# Patient Record
Sex: FEMALE | Race: WHITE
[De-identification: names, ages, dates, MRNs, and addresses within clinical notes are randomized per-mention and may not be internally consistent; named-entity substitution may affect disease eponyms.]

---

## 2020-05-26 ENCOUNTER — HOSPITAL ENCOUNTER (INPATIENT)
Dept: HOSPITAL 61 - ER | Age: 60
LOS: 14 days | Discharge: HOME | DRG: 438 | End: 2020-06-09
Attending: INTERNAL MEDICINE | Admitting: INTERNAL MEDICINE
Payer: OTHER GOVERNMENT

## 2020-05-26 VITALS — DIASTOLIC BLOOD PRESSURE: 78 MMHG | SYSTOLIC BLOOD PRESSURE: 126 MMHG

## 2020-05-26 VITALS — HEIGHT: 66 IN | BODY MASS INDEX: 23.74 KG/M2 | WEIGHT: 147.71 LBS

## 2020-05-26 VITALS — DIASTOLIC BLOOD PRESSURE: 64 MMHG | SYSTOLIC BLOOD PRESSURE: 117 MMHG

## 2020-05-26 VITALS — DIASTOLIC BLOOD PRESSURE: 56 MMHG | SYSTOLIC BLOOD PRESSURE: 111 MMHG

## 2020-05-26 DIAGNOSIS — E86.0: ICD-10-CM

## 2020-05-26 DIAGNOSIS — D17.9: ICD-10-CM

## 2020-05-26 DIAGNOSIS — Z79.4: ICD-10-CM

## 2020-05-26 DIAGNOSIS — M08.20: ICD-10-CM

## 2020-05-26 DIAGNOSIS — Z79.52: ICD-10-CM

## 2020-05-26 DIAGNOSIS — T38.0X5A: ICD-10-CM

## 2020-05-26 DIAGNOSIS — Z20.828: ICD-10-CM

## 2020-05-26 DIAGNOSIS — E11.65: ICD-10-CM

## 2020-05-26 DIAGNOSIS — G47.33: ICD-10-CM

## 2020-05-26 DIAGNOSIS — E11.01: ICD-10-CM

## 2020-05-26 DIAGNOSIS — T45.1X5A: ICD-10-CM

## 2020-05-26 DIAGNOSIS — K90.0: ICD-10-CM

## 2020-05-26 DIAGNOSIS — Z83.3: ICD-10-CM

## 2020-05-26 DIAGNOSIS — J45.909: ICD-10-CM

## 2020-05-26 DIAGNOSIS — D70.1: ICD-10-CM

## 2020-05-26 DIAGNOSIS — D63.8: ICD-10-CM

## 2020-05-26 DIAGNOSIS — E87.6: ICD-10-CM

## 2020-05-26 DIAGNOSIS — K21.9: ICD-10-CM

## 2020-05-26 DIAGNOSIS — Z90.49: ICD-10-CM

## 2020-05-26 DIAGNOSIS — J98.11: ICD-10-CM

## 2020-05-26 DIAGNOSIS — Z90.710: ICD-10-CM

## 2020-05-26 DIAGNOSIS — E78.1: ICD-10-CM

## 2020-05-26 DIAGNOSIS — E87.1: ICD-10-CM

## 2020-05-26 DIAGNOSIS — K85.90: Primary | ICD-10-CM

## 2020-05-26 DIAGNOSIS — Z87.891: ICD-10-CM

## 2020-05-26 LAB
ALBUMIN SERPL-MCNC: 3.6 G/DL (ref 3.4–5)
ALBUMIN/GLOB SERPL: 1.1 {RATIO} (ref 1–1.7)
ALP SERPL-CCNC: 99 U/L (ref 46–116)
ALT SERPL-CCNC: (no result) U/L (ref 14–59)
ANION GAP SERPL CALC-SCNC: 21 MMOL/L (ref 6–14)
APTT PPP: YELLOW S
AST SERPL-CCNC: (no result) U/L (ref 15–37)
BACTERIA #/AREA URNS HPF: 0 /HPF
BASOPHILS # BLD AUTO: 0 X10^3/UL (ref 0–0.2)
BASOPHILS NFR BLD: 1 % (ref 0–3)
BILIRUB SERPL-MCNC: 1.1 MG/DL (ref 0.2–1)
BILIRUB UR QL STRIP: NEGATIVE
BUN SERPL-MCNC: 20 MG/DL (ref 7–20)
BUN/CREAT SERPL: 29 (ref 6–20)
CALCIUM SERPL-MCNC: 8.3 MG/DL (ref 8.5–10.1)
CHLORIDE SERPL-SCNC: 94 MMOL/L (ref 98–107)
CHOLEST SERPL-MCNC: 418 MG/DL (ref 0–200)
CHOLEST/HDLC SERPL: (no result) {RATIO}
CO2 SERPL-SCNC: 14 MMOL/L (ref 21–32)
CREAT SERPL-MCNC: 0.7 MG/DL (ref 0.6–1)
EOSINOPHIL NFR BLD: 0 X10^3/UL (ref 0–0.7)
EOSINOPHIL NFR BLD: 1 % (ref 0–3)
ERYTHROCYTE [DISTWIDTH] IN BLOOD BY AUTOMATED COUNT: 15.9 % (ref 11.5–14.5)
FIBRINOGEN PPP-MCNC: CLEAR MG/DL
GFR SERPLBLD BASED ON 1.73 SQ M-ARVRAT: 85.9 ML/MIN
GLOBULIN SER-MCNC: 3.4 G/DL (ref 2.2–3.8)
GLUCOSE SERPL-MCNC: 477 MG/DL (ref 70–99)
HCT VFR BLD CALC: 42.8 % (ref 36–47)
HDLC SERPL-MCNC: (no result) MG/DL (ref 40–60)
HGB BLD-MCNC: 15 G/DL (ref 12–15.5)
LDLC: (no result) MG/DL (ref 0–100)
LIPASE: (no result) U/L (ref 73–393)
LYMPHOCYTES # BLD: 1.7 X10^3/UL (ref 1–4.8)
LYMPHOCYTES NFR BLD AUTO: 28 % (ref 24–48)
MCH RBC QN AUTO: 29 PG (ref 25–35)
MCHC RBC AUTO-ENTMCNC: 35 G/DL (ref 31–37)
MCV RBC AUTO: 83 FL (ref 79–100)
MONO #: 0.4 X10^3/UL (ref 0–1.1)
MONOCYTES NFR BLD: 6 % (ref 0–9)
NEUT #: 4.1 X10^3/UL (ref 1.8–7.7)
NEUTROPHILS NFR BLD AUTO: 65 % (ref 31–73)
NITRITE UR QL STRIP: NEGATIVE
PH UR STRIP: 6 [PH]
PLATELET # BLD AUTO: 247 X10^3/UL (ref 140–400)
POTASSIUM SERPL-SCNC: 3.7 MMOL/L (ref 3.5–5.1)
PROT SERPL-MCNC: 7 G/DL (ref 6.4–8.2)
PROT UR STRIP-MCNC: NEGATIVE MG/DL
RBC # BLD AUTO: 5.14 X10^6/UL (ref 3.5–5.4)
RBC #/AREA URNS HPF: (no result) /HPF (ref 0–2)
SODIUM SERPL-SCNC: 129 MMOL/L (ref 136–145)
SQUAMOUS #/AREA URNS LPF: (no result) /LPF
TRIGL SERPL-MCNC: 3252 MG/DL (ref 0–150)
UROBILINOGEN UR-MCNC: 0.2 MG/DL
VLDLC: 650 MG/DL (ref 0–40)
WBC # BLD AUTO: 6.3 X10^3/UL (ref 4–11)
WBC #/AREA URNS HPF: 0 /HPF (ref 0–4)

## 2020-05-26 PROCEDURE — 94760 N-INVAS EAR/PLS OXIMETRY 1: CPT

## 2020-05-26 PROCEDURE — 86301 IMMUNOASSAY TUMOR CA 19-9: CPT

## 2020-05-26 PROCEDURE — 84100 ASSAY OF PHOSPHORUS: CPT

## 2020-05-26 PROCEDURE — 85027 COMPLETE CBC AUTOMATED: CPT

## 2020-05-26 PROCEDURE — G0378 HOSPITAL OBSERVATION PER HR: HCPCS

## 2020-05-26 PROCEDURE — C1751 CATH, INF, PER/CENT/MIDLINE: HCPCS

## 2020-05-26 PROCEDURE — 82962 GLUCOSE BLOOD TEST: CPT

## 2020-05-26 PROCEDURE — 80061 LIPID PANEL: CPT

## 2020-05-26 PROCEDURE — 85007 BL SMEAR W/DIFF WBC COUNT: CPT

## 2020-05-26 PROCEDURE — 83690 ASSAY OF LIPASE: CPT

## 2020-05-26 PROCEDURE — 82784 ASSAY IGA/IGD/IGG/IGM EACH: CPT

## 2020-05-26 PROCEDURE — 82787 IGG 1 2 3 OR 4 EACH: CPT

## 2020-05-26 PROCEDURE — 84478 ASSAY OF TRIGLYCERIDES: CPT

## 2020-05-26 PROCEDURE — 81001 URINALYSIS AUTO W/SCOPE: CPT

## 2020-05-26 PROCEDURE — 36573 INSJ PICC RS&I 5 YR+: CPT

## 2020-05-26 PROCEDURE — 96374 THER/PROPH/DIAG INJ IV PUSH: CPT

## 2020-05-26 PROCEDURE — C1892 INTRO/SHEATH,FIXED,PEEL-AWAY: HCPCS

## 2020-05-26 PROCEDURE — 85610 PROTHROMBIN TIME: CPT

## 2020-05-26 PROCEDURE — 85025 COMPLETE CBC W/AUTO DIFF WBC: CPT

## 2020-05-26 PROCEDURE — 83735 ASSAY OF MAGNESIUM: CPT

## 2020-05-26 PROCEDURE — 83036 HEMOGLOBIN GLYCOSYLATED A1C: CPT

## 2020-05-26 PROCEDURE — 86140 C-REACTIVE PROTEIN: CPT

## 2020-05-26 PROCEDURE — 84443 ASSAY THYROID STIM HORMONE: CPT

## 2020-05-26 PROCEDURE — 82728 ASSAY OF FERRITIN: CPT

## 2020-05-26 PROCEDURE — 80048 BASIC METABOLIC PNL TOTAL CA: CPT

## 2020-05-26 PROCEDURE — 36415 COLL VENOUS BLD VENIPUNCTURE: CPT

## 2020-05-26 PROCEDURE — 77001 FLUOROGUIDE FOR VEIN DEVICE: CPT

## 2020-05-26 PROCEDURE — 80053 COMPREHEN METABOLIC PANEL: CPT

## 2020-05-26 PROCEDURE — 96361 HYDRATE IV INFUSION ADD-ON: CPT

## 2020-05-26 PROCEDURE — 74177 CT ABD & PELVIS W/CONTRAST: CPT

## 2020-05-26 PROCEDURE — 82150 ASSAY OF AMYLASE: CPT

## 2020-05-26 RX ADMIN — INSULIN LISPRO SCH UNITS: 100 INJECTION, SOLUTION INTRAVENOUS; SUBCUTANEOUS at 16:52

## 2020-05-26 RX ADMIN — METHYLPREDNISOLONE SODIUM SUCCINATE SCH MG: 40 INJECTION, POWDER, FOR SOLUTION INTRAMUSCULAR; INTRAVENOUS at 15:49

## 2020-05-26 RX ADMIN — DILTIAZEM HYDROCHLORIDE PRN MLS/HR: 5 INJECTION INTRAVENOUS at 18:13

## 2020-05-26 RX ADMIN — MORPHINE SULFATE PRN MG: 4 INJECTION, SOLUTION INTRAMUSCULAR; INTRAVENOUS at 19:13

## 2020-05-26 RX ADMIN — INSULIN LISPRO SCH UNITS: 100 INJECTION, SOLUTION INTRAVENOUS; SUBCUTANEOUS at 20:00

## 2020-05-26 RX ADMIN — BACITRACIN SCH MLS/HR: 5000 INJECTION, POWDER, FOR SOLUTION INTRAMUSCULAR at 15:49

## 2020-05-26 RX ADMIN — ENOXAPARIN SODIUM SCH MG: 40 INJECTION SUBCUTANEOUS at 15:50

## 2020-05-26 RX ADMIN — MORPHINE SULFATE PRN MG: 4 INJECTION, SOLUTION INTRAMUSCULAR; INTRAVENOUS at 15:56

## 2020-05-26 RX ADMIN — MORPHINE SULFATE PRN MG: 4 INJECTION, SOLUTION INTRAMUSCULAR; INTRAVENOUS at 23:10

## 2020-05-26 NOTE — PDOC1
History and Physical


Date of Admission


Date of Admission


DATE: 5/26/20 


TIME: 13:30





Identification/Chief Complaint


Chief Complaint


Nausea, vomiting, abdominal pain





Source


Source:  Patient





History of Present Illness


History of Present Illness


Ms Almanza is a 58yo F [employer]  w/ PMHx celiac disease, adult onset stills 

disease, Anemia, Asthma, [place] on CPAP, GERD who presents with severe upper 

abdominal pain, nausea, vomiting, fatigue. She states this started suddenly this

morning while she was working from home on teleconference (works as a 

on the  base). She only had oatmeal for breakfast.


She has never had anything similar to this in the past.  She generally feels 

unwell.  She is not had any fever, diarrhea, chest pain, shortness of breath, 

dysuria, hematuria, blood in her stools.  She denies alcohol abuse.  Pain does 

not move anywhere.  Nothing seems to make it better or worse.  She has not tried

anything at home.  He does not drink alcohol, and is status post cholecystect

jamil.  No calcium disorders.


She takes 20mg methotrexate weekly on Fridays and is on 3 mg of chronic 

prednisone for her stills disease.  She was diagnosed with celiac disease 

January 2020 and has just recently started on a gluten-free diet.  No recent 

sick contacts that she can recall.


Labs significant for lipase 76895, glucose 477, , bilirubin 1.1, Calcium 

8.3, K 3.7, WBC 6.3, Hb 15, Platelets 247


CT abdomen pelvis shows surgically absent gallbladder and surgically absent 

uterus and describes duodenal and jejunal intraluminal lipomas. Findings of 

acute pancreatitis. No loculated collections. No biliary dilatation.


Admitted for further treatment.





Past Medical History


Pulmonary:  Asthma, Other (NATO on CPAP)


GI:  GERD, Other (Celiac disease)


Heme/Onc:  Anemia NOS


Rheumatologic:  Other (Stills disease)


Dermatology:  No pertinent hx





Past Surgical History


Past Surgical History:  Cholecystectomy, Hysterectomy, Other (Bilateral 

bunionectomy, right shoulder arthroscopy)





Family History


Family History:  Diabetes





Social History


Smoke:  Quit


ALCOHOL:  none


Drugs:  None





Current Medications


Current Medications





Current Medications


Iohexol (Omnipaque 300 Mg/ml) 75 ml 1X  ONCE IV  Last administered on 5/26/20at 

11:30;  Start 5/26/20 at 11:30;  Stop 5/26/20 at 11:31;  Status DC


Iohexol (Omnipaque 240 Mg/ml) 50 ml 1X  ONCE PO  Last administered on 5/26/20at 

11:30;  Start 5/26/20 at 11:30;  Stop 5/26/20 at 11:31;  Status DC


Info (CONTRAST GIVEN -- Rx MONITORING) 1 each PRN DAILY  PRN MC SEE COMMENTS;  

Start 5/26/20 at 11:30;  Stop 5/28/20 at 11:29


Sodium Chloride 1,000 ml @  0 mls/hr 1X  ONCE IV  Last administered on 5/26/20at

12:27;  Start 5/26/20 at 12:15;  Stop 5/26/20 at 12:16;  Status DC


Morphine Sulfate (Morphine Sulfate) 5 mg 1X  ONCE IV  Last administered on 

5/26/20at 12:45;  Start 5/26/20 at 12:30;  Stop 5/26/20 at 12:33;  Status DC





Allergies


Allergies:  


Coded Allergies:  


     coconut (Verified  Allergy, Intermediate, HIVES, 5/26/20)





ROS


General:  YES: Fatigue, Malaise; 


   No: Chills, Night Sweats, Appetite, Other


PSYCHOLOGICAL ROS:  No: Anxiety, Behavioral Disorder, Concentration difficultie,

Decreased libido, Depression, Disorientation, Hallucinations, Hostility, 

Irritablity, Memory difficulties, Mood Swings, Obsessive thoughts, Physical 

abuse, Sexual abuse, Sleep disturbances, Suicidal ideation, Other


Eyes:  No Blurry vision, No Decreased vision, No Double vision, No Dry eyes, No 

Excessive tearing, No Eye Pain, No Itchy Eyes, No Loss of vision, No 

Photophobia, No Scotomata, No Uses contacts, No Uses glasses, No Other


HEENT:  No: Heacaches, Visual Changes, Hearing change, Nasal congestion, Nasal 

discharge, Oral lesions, Sinus pain, Sore Throat, Epistaxis, Sneezing, Snoring, 

Tinnitus, Vertigo, Vocal changes, Other


ALLERGY AND IMMUNOLOGY:  No: Hives, Insect Bite Sensitivity, Itchy/Watery Eyes, 

Nasal Congestion, Post Nasal Drip, Seasonal Allergies, Other


Hematological and Lymphatic:  No: Bleeding Problems, Blood Clots, Blood 

Transfusions, Brusing, Night Sweats, Pallor, Swollen Lymph Nodes, Other


ENDOCRINE:  No: Breast Changes, Galactorrhea, Hair Pattern Changes, Hot Flashes,

Malaise/lethargy, Mood Swings, Palpitations, Polydipsia/polyuria, Skin Changes, 

Temperature Intolerance, Unexpected Weight Changes, Other


Breast:  No New/Changing Breast Lumps, No Nipple changes, No Nipple discharge, 

No Other


Respiratory:  No: Cough, Hemoptysis, Orthopnea, Pleuritic Pain, Shortness of 

breath, SOB with excertion, Sputum Changes, Stridor, Tachypnea, Wheezing, Other


Cardiovascular:  No Chest Pain, No Palpitations, No Orthopnea, No Paroxysmal 

Noc. Dyspnea, No Edema, No Lt Headedness, No Other


Gastrointestinal:  Yes Nausea, Yes Vomiting, Yes Abdominal Pain; 


   No Diarrhea, No Constipation, No Melena, No Hematochezia, No Other


Genitourinary:  No Dysuria, No Frequency, No Incontinence, No Hematuria, No 

Retention, No Discharge, No Urgency, No Pain, No Flank Pain, No Other, No , No ,

No , No , No , No , No 


Musculoskeletal:  No Gait Disturbance, No Joint Pain, No Joint Stiffness, No 

Joint Swelling, No Muscle Pain, No Muscular Weakness, No Pain In:, No Swelling 

In:, No Other


Neurological:  No Behavorial Changes, No Bowel/Bladder ControlChng, No 

Confusion, No Dizziness, No Gait Disturbance, No Headaches, No Impaired 

Coord/balance, No Memory Loss, No Numbness/Tingling, No Seizures, No Speech 

Problems, No Tremors, No Visual Changes, No Weakness, No Other


Skin:  No Dry Skin, No Eczema, No Hair Changes, No Lumps, No Mole Changes, No 

Mottling, No Nail Changes, No Pruritus, No Rash, No Skin Lesion Changes, No 

Other, No Acne





Physical Exam


General:  Alert, Oriented X3, Cooperative, moderate distress


HEENT:  Atraumatic, PERRLA, EOMI, Mucous membr. moist/pink


Lungs:  Clear to auscultation, Normal air movement


Heart:  S1S2, RRR, no thrills, no rubs, no gallops, no murmurs


Abdomen:  Normal bowel sounds, Soft, No hepatosplenomegaly, No masses, Other 

(Diffuse tenderness)


Rectal Exam:  not examined


Extremities:  No clubbing, No cyanosis, No edema, Normal pulses, No 

tenderness/swelling


Skin:  No rashes, No breakdown, No significant lesion


Neuro:  Normal gait, Normal speech, Strength at 5/5 X4 ext, Normal tone, Sens

ation intact, Cranial nerves 3-12 NL, Reflexes 2+


Psych/Mental Status:  Mental status NL, Mood NL





Vitals


Vitals





Vital Signs








  Date Time  Temp Pulse Resp B/P (MAP) Pulse Ox O2 Delivery O2 Flow Rate FiO2


 


5/26/20 12:45   19  94 Room Air  


 


5/26/20 11:52  78  138/79 (98)    


 


5/26/20 09:48 97.4       





 97.4       











Labs


Labs





Laboratory Tests








Test


 5/26/20


10:00 5/26/20


11:17


 


White Blood Count


 6.3 x10^3/uL


(4.0-11.0) 





 


Red Blood Count


 5.14 x10^6/uL


(3.50-5.40) 





 


Hemoglobin


 15.0 g/dL


(12.0-15.5) 





 


Hematocrit


 42.8 %


(36.0-47.0) 





 


Mean Corpuscular Volume 83 fL ()  


 


Mean Corpuscular Hemoglobin 29 pg (25-35)  


 


Mean Corpuscular Hemoglobin


Concent 35 g/dL


(31-37) 





 


Red Cell Distribution Width


 15.9 %


(11.5-14.5) 





 


Platelet Count


 247 x10^3/uL


(140-400) 





 


Neutrophils (%) (Auto) 65 % (31-73)  


 


Lymphocytes (%) (Auto) 28 % (24-48)  


 


Monocytes (%) (Auto) 6 % (0-9)  


 


Eosinophils (%) (Auto) 1 % (0-3)  


 


Basophils (%) (Auto) 1 % (0-3)  


 


Neutrophils # (Auto)


 4.1 x10^3/uL


(1.8-7.7) 





 


Lymphocytes # (Auto)


 1.7 x10^3/uL


(1.0-4.8) 





 


Monocytes # (Auto)


 0.4 x10^3/uL


(0.0-1.1) 





 


Eosinophils # (Auto)


 0.0 x10^3/uL


(0.0-0.7) 





 


Basophils # (Auto)


 0.0 x10^3/uL


(0.0-0.2) 





 


Sodium Level


 129 mmol/L


(136-145) 





 


Potassium Level


 3.7 mmol/L


(3.5-5.1) 





 


Chloride Level


 94 mmol/L


() 





 


Carbon Dioxide Level


 14 mmol/L


(21-32) 





 


Anion Gap 21 (6-14)  


 


Blood Urea Nitrogen


 20 mg/dL


(7-20) 





 


Creatinine


 0.7 mg/dL


(0.6-1.0) 





 


Estimated GFR


(Cockcroft-Gault) 85.9 


 





 


BUN/Creatinine Ratio 29 (6-20)  


 


Glucose Level


 477 mg/dL


(70-99) 





 


Calcium Level


 8.3 mg/dL


(8.5-10.1) 





 


Total Bilirubin


 1.1 mg/dL


(0.2-1.0) 





 


Aspartate Amino Transf


(AST/SGOT)  U/L (15-37) 


 





 


Alanine Aminotransferase


(ALT/SGPT)  U/L (14-59) 


 





 


Alkaline Phosphatase


 99 U/L


() 





 


Total Protein


 7.0 g/dL


(6.4-8.2) 





 


Albumin


 3.6 g/dL


(3.4-5.0) 





 


Albumin/Globulin Ratio 1.1 (1.0-1.7)  


 


Lipase


 14100 U/L


() 





 


Urine Collection Type  Unknown 


 


Urine Color  Yellow 


 


Urine Clarity  Clear 


 


Urine pH  6.0 (<5.0-8.0) 


 


Urine Specific Gravity


 


 >=1.030


(1.000-1.030)


 


Urine Protein


 


 Negative mg/dL


(NEG-TRACE)


 


Urine Glucose (UA)


 


 >=1000 mg/dL


(NEG)


 


Urine Ketones (Stick)  40 mg/dL (NEG) 


 


Urine Blood  Negative (NEG) 


 


Urine Nitrite  Negative (NEG) 


 


Urine Bilirubin  Negative (NEG) 


 


Urine Urobilinogen Dipstick


 


 0.2 mg/dL (0.2


mg/dL)


 


Urine Leukocyte Esterase  Negative (NEG) 


 


Urine RBC  Occ /HPF (0-2) 


 


Urine WBC  0 /HPF (0-4) 


 


Urine Squamous Epithelial


Cells 


 Occ /LPF 





 


Urine Bacteria  0 /HPF (0-FEW) 








Laboratory Tests








Test


 5/26/20


10:00 5/26/20


11:17


 


White Blood Count


 6.3 x10^3/uL


(4.0-11.0) 





 


Red Blood Count


 5.14 x10^6/uL


(3.50-5.40) 





 


Hemoglobin


 15.0 g/dL


(12.0-15.5) 





 


Hematocrit


 42.8 %


(36.0-47.0) 





 


Mean Corpuscular Volume 83 fL ()  


 


Mean Corpuscular Hemoglobin 29 pg (25-35)  


 


Mean Corpuscular Hemoglobin


Concent 35 g/dL


(31-37) 





 


Red Cell Distribution Width


 15.9 %


(11.5-14.5) 





 


Platelet Count


 247 x10^3/uL


(140-400) 





 


Neutrophils (%) (Auto) 65 % (31-73)  


 


Lymphocytes (%) (Auto) 28 % (24-48)  


 


Monocytes (%) (Auto) 6 % (0-9)  


 


Eosinophils (%) (Auto) 1 % (0-3)  


 


Basophils (%) (Auto) 1 % (0-3)  


 


Neutrophils # (Auto)


 4.1 x10^3/uL


(1.8-7.7) 





 


Lymphocytes # (Auto)


 1.7 x10^3/uL


(1.0-4.8) 





 


Monocytes # (Auto)


 0.4 x10^3/uL


(0.0-1.1) 





 


Eosinophils # (Auto)


 0.0 x10^3/uL


(0.0-0.7) 





 


Basophils # (Auto)


 0.0 x10^3/uL


(0.0-0.2) 





 


Sodium Level


 129 mmol/L


(136-145) 





 


Potassium Level


 3.7 mmol/L


(3.5-5.1) 





 


Chloride Level


 94 mmol/L


() 





 


Carbon Dioxide Level


 14 mmol/L


(21-32) 





 


Anion Gap 21 (6-14)  


 


Blood Urea Nitrogen


 20 mg/dL


(7-20) 





 


Creatinine


 0.7 mg/dL


(0.6-1.0) 





 


Estimated GFR


(Cockcroft-Gault) 85.9 


 





 


BUN/Creatinine Ratio 29 (6-20)  


 


Glucose Level


 477 mg/dL


(70-99) 





 


Calcium Level


 8.3 mg/dL


(8.5-10.1) 





 


Total Bilirubin


 1.1 mg/dL


(0.2-1.0) 





 


Aspartate Amino Transf


(AST/SGOT)  U/L (15-37) 


 





 


Alanine Aminotransferase


(ALT/SGPT)  U/L (14-59) 


 





 


Alkaline Phosphatase


 99 U/L


() 





 


Total Protein


 7.0 g/dL


(6.4-8.2) 





 


Albumin


 3.6 g/dL


(3.4-5.0) 





 


Albumin/Globulin Ratio 1.1 (1.0-1.7)  


 


Lipase


 53999 U/L


() 





 


Urine Collection Type  Unknown 


 


Urine Color  Yellow 


 


Urine Clarity  Clear 


 


Urine pH  6.0 (<5.0-8.0) 


 


Urine Specific Gravity


 


 >=1.030


(1.000-1.030)


 


Urine Protein


 


 Negative mg/dL


(NEG-TRACE)


 


Urine Glucose (UA)


 


 >=1000 mg/dL


(NEG)


 


Urine Ketones (Stick)  40 mg/dL (NEG) 


 


Urine Blood  Negative (NEG) 


 


Urine Nitrite  Negative (NEG) 


 


Urine Bilirubin  Negative (NEG) 


 


Urine Urobilinogen Dipstick


 


 0.2 mg/dL (0.2


mg/dL)


 


Urine Leukocyte Esterase  Negative (NEG) 


 


Urine RBC  Occ /HPF (0-2) 


 


Urine WBC  0 /HPF (0-4) 


 


Urine Squamous Epithelial


Cells 


 Occ /LPF 





 


Urine Bacteria  0 /HPF (0-FEW) 











Images


Images


CT abdomen -


Mild bibasilar costophrenic sulcus atelectasis is present. Bilateral carotid 

cholecystectomy noted. Liver and spleen are unremarkable. At the inferior tip of

the right hepatic lobe, there is a very small to characterize low-attenuation 

lesion which is present on axial image 49. 


This may represent a cyst or other benign process.


Stranding about the pancreas with surrounding edema is noted. Edema extends 

along the left lateral conal fascia.


Low-attenuation involving the distal duodenum on axial image 49 is present 

measuring 0.6 cm diameter. Within the right proximal jejunum on coronal image 

23, there is a linear fat attenuation structure measuring 2.1 cm x 0.4 cm. These

have the appearance of benign lipomas. There is no bowel obstruction. No 

extraluminal gas. Moderate quantity of stool in the colon is present. Appendix 

is normal. No inflammatory change about the cecum. 


Urinary bladder is unremarkable.


 


Lower lumbar spine facet joint degenerative remodeling is present. 


Transitional L5 vertebra is present.


Hysterectomy noted.


 


Impression:


Findings of acute pancreatitis. No loculated collections. No biliary dilatation.




Distal duodenal and proximal jejunal intraluminal lipomas.





VTE Prophylaxis Ordered


VTE Prophylaxis Devices:  No


VTE Pharmacological Prophylaxi:  Yes





Assessment/Plan


Assessment/Plan


A/P:


Acute pancreatitis - with N/V, abdominal pain and elevated lipase, CT confirmed.

Will keep NPO, pain control. IV fluids. Consult GI. Hold methotrexate. Check 

triglycerides


Duodenal and jejunal intraluminal lipomas - GI consulted, uncertain clinical 

signficance


Hyponatremia - likely from hyperglycemia


Hyperglycemia - given her steroids and methotrexate therapy is at risk for 

developing diabetes, will check A1c, sliding scale q4hrs


Celiac disease - on diet outpatient, NPO for now


Adult onset stills disease - on MTX 20mg weekly on Fridays, Prednisone 3mg 

daily.


Anemia - likely related to chronic disease.


Asthma - prn nebs


NATO on CPAP - can bring her home CPAP vs O2 nocturnal


GERD - PPI IV





FEN - NPO


PPX - lovenox


FULL CODE


Dispo - inpatient for above











TITO ASHBY MD        May 26, 2020 13:37

## 2020-05-26 NOTE — RAD
Examination: CT ABD PELV W/ORAL IV CONTRAST

 

History: Mid abdominal pain

 

Comparison/Correlation: 7/22/2009 CT abdomen and pelvis without contrast

 

Findings: Axial images of the abdomen and pelvis were obtained following 

IV and oral contrast. Sagittal and coronal reformatted images were 

provided.

 

Mild bibasilar costophrenic sulcus atelectasis is present. Bilateral 

carotid cholecystectomy noted. Liver and spleen are unremarkable. At the 

inferior tip of the right hepatic lobe, there is a very small to 

characterize low-attenuation lesion which is present on axial image 49. 

This may represent a cyst or other benign process.

 

Stranding about the pancreas with surrounding edema is noted. Edema 

extends along the left lateral conal fascia.

 

Low-attenuation involving the distal duodenum on axial image 49 is present

measuring 0.6 cm diameter. Within the right proximal jejunum on coronal 

image 23, there is a linear fat attenuation structure measuring 2.1 cm x 

0.4 cm. These have the appearance of benign lipomas. There is no bowel 

obstruction. No extraluminal gas. Moderate quantity of stool in the colon 

is present. Appendix is normal. No inflammatory change about the cecum. 

Urinary bladder is unremarkable.

 

Lower lumbar spine facet joint degenerative remodeling is present. 

Transitional L5 vertebra is present.

Hysterectomy noted.

 

Impression:

Findings of acute pancreatitis. No loculated collections. No biliary 

dilatation.

 

Distal duodenal and proximal jejunal intraluminal lipomas.

 

 

 

PQRS Compliance Statement:

 

One or more of the following individualized dose reduction techniques were

utilized for this examination:  

1. Automated exposure control  

2. Adjustment of the mA and/or kV according to patient size  

3. Use of iterative reconstruction technique

 

Electronically signed by: Ozzy Armenta MD (5/26/2020 12:47 PM) [id number]

## 2020-05-26 NOTE — PHYS DOC
Past Medical History


Past Medical History:  Anemia, Asthma, GERD, Other


Additional Past Medical Histor:  CELIAC,ADULT ONSET STILL'S DISEASE


Past Surgical History:  Cholecystectomy, Hysterectomy, Other


Additional Past Surgical Histo:  R SHOULDER,BILAT BUNIONECTOMY


Smoking Status:  Former Smoker


Alcohol Use:  None





General Adult


EDM:


Chief Complaint:  ABDOMINAL PAIN





HPI:


HPI:





Patient is a 59  year old female who presents with severe upper abdominal pain, 

nausea, vomiting, fatigue.  She states this started suddenly this morning while 

she was working from home on [employer].  She has never had anything similar

to this in the past.  She generally feels unwell.  She is not had any fever, 

diarrhea, chest pain, shortness of breath, dysuria, hematuria, blood in her 

stools.  She denies alcohol abuse.  Pain does not move anywhere.  Nothing seems 

to make it better or worse.  She has not tried anything at home.





Review of Systems:


Review of Systems:


General: Denies fever, chills, sweats, fatigue


Eyes: Denies drainage, blurred vision


HENT: Denies rhinorrhea, sore throat


Respiratory: Denies cough, shortness of breath, wheezing


Cardiac: Denies edema, palpitations, chest pain


GI: Reports abdominal pain, N/V


MSK: Denies back pain, neck pain


Skin: Denies rash, jaundice


Neuro: Denies headache, dizziness


Psychiatric: Denies SI/HI





Heart Score:


Risk Factors:


Risk Factors:  DM, Current or recent (<one month) smoker, HTN, HLP, family 

history of CAD, obesity.


Risk Scores:


Score 0 - 3:  2.5% MACE over next 6 weeks - Discharge Home


Score 4 - 6:  20.3% MACE over next 6 weeks - Admit for Clinical Observation


Score 7 - 10:  72.7% MACE over next 6 weeks - Early Invasive Strategies





Current Medications:





Current Medications








 Medications


  (Trade)  Dose


 Ordered  Sig/Jean  Start Time


 Stop Time Status Last Admin


Dose Admin


 


 Info


  (CONTRAST GIVEN


 -- Rx MONITORING)  1 each  PRN DAILY  PRN  5/26/20 11:30


 5/28/20 11:29   





 


 Iohexol


  (Omnipaque 240


 Mg/ml)  50 ml  1X  ONCE  5/26/20 11:30


 5/26/20 11:31 DC 5/26/20 11:30


50 ML


 


 Iohexol


  (Omnipaque 300


 Mg/ml)  75 ml  1X  ONCE  5/26/20 11:30


 5/26/20 11:31 DC 5/26/20 11:30


75 ML


 


 Morphine Sulfate


  (Morphine


 Sulfate)  5 mg  1X  ONCE  5/26/20 12:30


 5/26/20 12:33 DC 5/26/20 12:45


5 MG


 


 Sodium Chloride  1,000 ml @ 


 0 mls/hr  1X  ONCE  5/26/20 12:15


 5/26/20 12:16 DC 5/26/20 12:27


1,000 MLS/HR











Allergies:


Allergies:





Allergies








Coded Allergies Type Severity Reaction Last Updated Verified


 


  coconut Allergy Intermediate HIVES 5/26/20 Yes











Physical Exam:


PE:


Constitutional: Well developed, well nourished, Cooperative, NAD, ill appearing


HEENT: Normocephalic, atraumatic, oropharynx dry, EOMI, PERRL, no drainage from 

eyes, normal conjunctiva


Neck: Supple, normal range of motion, no stridor


Cardiovascular: Tachycardic, 2+ radial pulses bilaterally, no edema


Respiratory: CTA bilaterally, no respiratory distress, no wheezing/crackles


Abdomen: Soft, epigastric and periumbilical tenderness, nondistended, no masses


Skin: Warm, dry, intact


Extremities: No obvious deformities


Neurologic: Alert and Oriented x3, motor and sensory function grossly normal, no

focal deficits


Psychologic: Normal affect, normal judgment, normal mood. No SI/HI





Current Patient Data:


Labs:





                                Laboratory Tests








Test


 5/26/20


10:00 5/26/20


11:17


 


White Blood Count


 6.3 x10^3/uL


(4.0-11.0) 





 


Red Blood Count


 5.14 x10^6/uL


(3.50-5.40) 





 


Hemoglobin


 15.0 g/dL


(12.0-15.5) 





 


Hematocrit


 42.8 %


(36.0-47.0) 





 


Mean Corpuscular Volume


 83 fL ()


 





 


Mean Corpuscular Hemoglobin 29 pg (25-35)   


 


Mean Corpuscular Hemoglobin


Concent 35 g/dL


(31-37) 





 


Red Cell Distribution Width


 15.9 %


(11.5-14.5)  H 





 


Platelet Count


 247 x10^3/uL


(140-400) 





 


Neutrophils (%) (Auto) 65 % (31-73)   


 


Lymphocytes (%) (Auto) 28 % (24-48)   


 


Monocytes (%) (Auto) 6 % (0-9)   


 


Eosinophils (%) (Auto) 1 % (0-3)   


 


Basophils (%) (Auto) 1 % (0-3)   


 


Neutrophils # (Auto)


 4.1 x10^3/uL


(1.8-7.7) 





 


Lymphocytes # (Auto)


 1.7 x10^3/uL


(1.0-4.8) 





 


Monocytes # (Auto)


 0.4 x10^3/uL


(0.0-1.1) 





 


Eosinophils # (Auto)


 0.0 x10^3/uL


(0.0-0.7) 





 


Basophils # (Auto)


 0.0 x10^3/uL


(0.0-0.2) 





 


Sodium Level


 129 mmol/L


(136-145)  L 





 


Potassium Level


 3.7 mmol/L


(3.5-5.1) 





 


Chloride Level


 94 mmol/L


()  L 





 


Carbon Dioxide Level


 14 mmol/L


(21-32)  L 





 


Anion Gap 21 (6-14)  H 


 


Blood Urea Nitrogen


 20 mg/dL


(7-20) 





 


Creatinine


 0.7 mg/dL


(0.6-1.0) 





 


Estimated GFR


(Cockcroft-Gault) 85.9  


 





 


BUN/Creatinine Ratio 29 (6-20)  H 


 


Glucose Level


 477 mg/dL


(70-99)  H 





 


Calcium Level


 8.3 mg/dL


(8.5-10.1)  L 





 


Total Bilirubin


 1.1 mg/dL


(0.2-1.0)  H 





 


Aspartate Amino Transferase


(AST)  U/L (15-37)  


 





 


Alanine Aminotransferase (ALT)  U/L (14-59)   


 


Alkaline Phosphatase


 99 U/L


() 





 


Total Protein


 7.0 g/dL


(6.4-8.2) 





 


Albumin


 3.6 g/dL


(3.4-5.0) 





 


Albumin/Globulin Ratio 1.1 (1.0-1.7)   


 


Lipase


 11539 U/L


()  H 





 


Urine Collection Type  Unknown  


 


Urine Color  Yellow  


 


Urine Clarity  Clear  


 


Urine pH


 


 6.0 (<5.0-8.0)





 


Urine Specific Gravity


 


 >=1.030


(1.000-1.030)


 


Urine Protein


 


 Negative mg/dL


(NEG-TRACE)


 


Urine Glucose (UA)


 


 >=1000 mg/dL


(NEG)


 


Urine Ketones (Stick)


 


 40 mg/dL (NEG)





 


Urine Blood


 


 Negative (NEG)





 


Urine Nitrite


 


 Negative (NEG)





 


Urine Bilirubin


 


 Negative (NEG)





 


Urine Urobilinogen Dipstick


 


 0.2 mg/dL (0.2


mg/dL)


 


Urine Leukocyte Esterase


 


 Negative (NEG)





 


Urine RBC


 


 Occ /HPF (0-2)





 


Urine WBC  0 /HPF (0-4)  


 


Urine Squamous Epithelial


Cells 


 Occ /LPF  





 


Urine Bacteria


 


 0 /HPF (0-FEW)








                                Laboratory Tests


5/26/20 10:00








                                Laboratory Tests


5/26/20 10:00








Vital Signs:





                                   Vital Signs








  Date Time  Temp Pulse Resp B/P (MAP) Pulse Ox O2 Delivery O2 Flow Rate FiO2


 


5/26/20 14:16 97.9 92 20 126/78 (94) 95 Room Air  





 97.9       











EKG:


EKG:


[]





Radiology/Procedures:


Radiology/Procedures:


[]





Course & Med Decision Making:


Course & Med Decision Making


Pertinent Labs and Imaging studies reviewed. (See chart for details)





Patient is a 59-year-old female presents to the emergency room complaining of 

severe abdominal pain, nausea, vomiting.  Patient has significant tenderness.  

She is ill-appearing on exam.  Vitals are otherwise stable.  Abdominal labs 

including CBC, LFTs, BMP, lipase, UA were ordered.  CT abdomen pelvis was also 

ordered due to significant tenderness and patient's age.  Labs are suggestive of

 pancreatitis.  Patient does have some electrolyte abnormalities and will be 

given fluids and pain medicine down here in the emergency room.  I have 

discussed the results with her and have recommended admission.  Patient is in 

agreement with plan.  She will be admitted for further care.





Dragon Disclaimer:


Dragon Disclaimer:


This electronic medical record was generated, in whole or in part, using a voice

 recognition dictation system.





Departure


Departure


Impression:  


   Primary Impression:  


   Pancreatitis, acute


   Additional Impressions:  


   Dehydration


   Hyperglycemia


Disposition:  09 ADMITTED AS INPATIENT


Condition:  STABLE


Referrals:  


BAILEE WHITLOCK MD (PCP)











ISRAEL PAUL MD              May 26, 2020 14:40

## 2020-05-27 VITALS — DIASTOLIC BLOOD PRESSURE: 74 MMHG | SYSTOLIC BLOOD PRESSURE: 124 MMHG

## 2020-05-27 VITALS — DIASTOLIC BLOOD PRESSURE: 66 MMHG | SYSTOLIC BLOOD PRESSURE: 116 MMHG

## 2020-05-27 VITALS — SYSTOLIC BLOOD PRESSURE: 130 MMHG | DIASTOLIC BLOOD PRESSURE: 77 MMHG

## 2020-05-27 VITALS — DIASTOLIC BLOOD PRESSURE: 72 MMHG | SYSTOLIC BLOOD PRESSURE: 131 MMHG

## 2020-05-27 VITALS — DIASTOLIC BLOOD PRESSURE: 59 MMHG | SYSTOLIC BLOOD PRESSURE: 106 MMHG

## 2020-05-27 VITALS — DIASTOLIC BLOOD PRESSURE: 72 MMHG | SYSTOLIC BLOOD PRESSURE: 135 MMHG

## 2020-05-27 VITALS — DIASTOLIC BLOOD PRESSURE: 58 MMHG | SYSTOLIC BLOOD PRESSURE: 115 MMHG

## 2020-05-27 LAB
ALBUMIN SERPL-MCNC: 2.9 G/DL (ref 3.4–5)
ALBUMIN/GLOB SERPL: 0.8 {RATIO} (ref 1–1.7)
ALP SERPL-CCNC: 78 U/L (ref 46–116)
ALT SERPL-CCNC: 30 U/L (ref 14–59)
ANION GAP SERPL CALC-SCNC: 8 MMOL/L (ref 6–14)
AST SERPL-CCNC: 22 U/L (ref 15–37)
BASOPHILS # BLD AUTO: 0 X10^3/UL (ref 0–0.2)
BASOPHILS NFR BLD: 1 % (ref 0–3)
BILIRUB SERPL-MCNC: 0.8 MG/DL (ref 0.2–1)
BUN SERPL-MCNC: 16 MG/DL (ref 7–20)
BUN/CREAT SERPL: 20 (ref 6–20)
CALCIUM SERPL-MCNC: 7.8 MG/DL (ref 8.5–10.1)
CHLORIDE SERPL-SCNC: 106 MMOL/L (ref 98–107)
CO2 SERPL-SCNC: 25 MMOL/L (ref 21–32)
CREAT SERPL-MCNC: 0.8 MG/DL (ref 0.6–1)
EOSINOPHIL NFR BLD: 0 % (ref 0–3)
EOSINOPHIL NFR BLD: 0 X10^3/UL (ref 0–0.7)
ERYTHROCYTE [DISTWIDTH] IN BLOOD BY AUTOMATED COUNT: 16.2 % (ref 11.5–14.5)
GFR SERPLBLD BASED ON 1.73 SQ M-ARVRAT: 73.7 ML/MIN
GLOBULIN SER-MCNC: 3.5 G/DL (ref 2.2–3.8)
GLUCOSE SERPL-MCNC: 215 MG/DL (ref 70–99)
HBA1C MFR BLD: 13.6 % (ref 4.8–5.6)
HCT VFR BLD CALC: 40.4 % (ref 36–47)
HGB BLD-MCNC: 13.8 G/DL (ref 12–15.5)
LYMPHOCYTES # BLD: 0.8 X10^3/UL (ref 1–4.8)
LYMPHOCYTES NFR BLD AUTO: 17 % (ref 24–48)
MCH RBC QN AUTO: 28 PG (ref 25–35)
MCHC RBC AUTO-ENTMCNC: 34 G/DL (ref 31–37)
MCV RBC AUTO: 83 FL (ref 79–100)
MONO #: 0.7 X10^3/UL (ref 0–1.1)
MONOCYTES NFR BLD: 16 % (ref 0–9)
NEUT #: 2.9 X10^3/UL (ref 1.8–7.7)
NEUTROPHILS NFR BLD AUTO: 66 % (ref 31–73)
PLATELET # BLD AUTO: 217 X10^3/UL (ref 140–400)
POTASSIUM SERPL-SCNC: 4.2 MMOL/L (ref 3.5–5.1)
PROT SERPL-MCNC: 6.4 G/DL (ref 6.4–8.2)
RBC # BLD AUTO: 4.87 X10^6/UL (ref 3.5–5.4)
SODIUM SERPL-SCNC: 139 MMOL/L (ref 136–145)
WBC # BLD AUTO: 4.5 X10^3/UL (ref 4–11)

## 2020-05-27 RX ADMIN — FENTANYL CITRATE PRN MCG: 50 INJECTION INTRAMUSCULAR; INTRAVENOUS at 14:29

## 2020-05-27 RX ADMIN — INSULIN LISPRO SCH UNITS: 100 INJECTION, SOLUTION INTRAVENOUS; SUBCUTANEOUS at 17:17

## 2020-05-27 RX ADMIN — INSULIN LISPRO SCH UNITS: 100 INJECTION, SOLUTION INTRAVENOUS; SUBCUTANEOUS at 09:16

## 2020-05-27 RX ADMIN — INSULIN LISPRO SCH UNITS: 100 INJECTION, SOLUTION INTRAVENOUS; SUBCUTANEOUS at 00:00

## 2020-05-27 RX ADMIN — INSULIN LISPRO SCH UNITS: 100 INJECTION, SOLUTION INTRAVENOUS; SUBCUTANEOUS at 12:21

## 2020-05-27 RX ADMIN — FENTANYL CITRATE PRN MCG: 50 INJECTION INTRAMUSCULAR; INTRAVENOUS at 18:12

## 2020-05-27 RX ADMIN — BACITRACIN SCH MLS/HR: 5000 INJECTION, POWDER, FOR SOLUTION INTRAMUSCULAR at 02:02

## 2020-05-27 RX ADMIN — MORPHINE SULFATE PRN MG: 4 INJECTION, SOLUTION INTRAMUSCULAR; INTRAVENOUS at 02:10

## 2020-05-27 RX ADMIN — ENOXAPARIN SODIUM SCH MG: 40 INJECTION SUBCUTANEOUS at 17:11

## 2020-05-27 RX ADMIN — INSULIN LISPRO SCH UNITS: 100 INJECTION, SOLUTION INTRAVENOUS; SUBCUTANEOUS at 03:30

## 2020-05-27 RX ADMIN — FENTANYL CITRATE PRN MCG: 50 INJECTION INTRAMUSCULAR; INTRAVENOUS at 20:22

## 2020-05-27 RX ADMIN — INSULIN LISPRO SCH UNITS: 100 INJECTION, SOLUTION INTRAVENOUS; SUBCUTANEOUS at 20:28

## 2020-05-27 RX ADMIN — METHYLPREDNISOLONE SODIUM SUCCINATE SCH MG: 40 INJECTION, POWDER, FOR SOLUTION INTRAMUSCULAR; INTRAVENOUS at 09:10

## 2020-05-27 RX ADMIN — DILTIAZEM HYDROCHLORIDE PRN MLS/HR: 5 INJECTION INTRAVENOUS at 02:12

## 2020-05-27 RX ADMIN — BACITRACIN SCH MLS/HR: 5000 INJECTION, POWDER, FOR SOLUTION INTRAMUSCULAR at 12:17

## 2020-05-27 RX ADMIN — FENTANYL CITRATE PRN MCG: 50 INJECTION INTRAMUSCULAR; INTRAVENOUS at 12:17

## 2020-05-27 RX ADMIN — FENTANYL CITRATE PRN MCG: 50 INJECTION INTRAMUSCULAR; INTRAVENOUS at 23:14

## 2020-05-27 RX ADMIN — MORPHINE SULFATE PRN MG: 4 INJECTION, SOLUTION INTRAMUSCULAR; INTRAVENOUS at 09:09

## 2020-05-27 RX ADMIN — MORPHINE SULFATE PRN MG: 4 INJECTION, SOLUTION INTRAMUSCULAR; INTRAVENOUS at 05:39

## 2020-05-27 NOTE — PDOC2
CONSULT


Date of Consult


Date of Consult


DATE: 5/27/20 


TIME: 09:38





Reason for Consult


Reason for Consult:


Acute pancreatitis s/p jeremi





Past Medical History


Pulmonary:  Asthma, Other ([facility] on CPAP)


GI:  GERD, Other (Celiac disease)


Heme/Onc:  Anemia NOS


Rheumatologic:  Other (Stills disease)


Dermatology:  No pertinent hx





Past Surgical History


Past Surgical History:  Cholecystectomy, Hysterectomy, Other (Bilateral 

bunionectomy, right shoulder arthroscopy)





Family History


Family History:  Diabetes





Social History


Quit


ALCOHOL:  none


Drugs:  None





Current Problem List


Problem List


Problems


Medical Problems:


(1) Dehydration


Status: Acute  





(2) Hyperglycemia


Status: Acute  





(3) Pancreatitis, acute


Status: Acute  











Current Medications


Current Medications





Current Medications


Iohexol (Omnipaque 300 Mg/ml) 75 ml 1X  ONCE IV  Last administered on 5/26/20at 

11:30;  Start 5/26/20 at 11:30;  Stop 5/26/20 at 11:31;  Status DC


Iohexol (Omnipaque 240 Mg/ml) 50 ml 1X  ONCE PO  Last administered on 5/26/20at 

11:30;  Start 5/26/20 at 11:30;  Stop 5/26/20 at 11:31;  Status DC


Info (CONTRAST GIVEN -- Rx MONITORING) 1 each PRN DAILY  PRN MC SEE COMMENTS;  

Start 5/26/20 at 11:30;  Stop 5/28/20 at 11:29


Sodium Chloride 1,000 ml @  0 mls/hr 1X  ONCE IV  Last administered on 5/26/20at

12:27;  Start 5/26/20 at 12:15;  Stop 5/26/20 at 12:16;  Status DC


Morphine Sulfate (Morphine Sulfate) 5 mg 1X  ONCE IV  Last administered on 

5/26/20at 12:45;  Start 5/26/20 at 12:30;  Stop 5/26/20 at 12:33;  Status DC


Morphine Sulfate (Morphine Sulfate) 5 mg 1X  ONCE IV ;  Start 5/26/20 at 13:45; 

Stop 5/26/20 at 13:46;  Status DC


Morphine Sulfate (Morphine Sulfate) 4 mg PRN Q2HR  PRN IV MODERATE TO SEVERE 

PAIN Last administered on 5/27/20at 09:09;  Start 5/26/20 at 14:45


Sodium Chloride 1,000 ml @  100 mls/hr Q10H IV  Last administered on 5/27/20at 

02:02;  Start 5/26/20 at 14:44


Ondansetron HCl (Zofran) 4 mg PRN Q4HRS  PRN IV NAUSEA/VOMITING;  Start 5/26/20 

at 14:45


Acetaminophen (Tylenol Supp) 650 mg PRN Q4HRS  PRN KS TEMP OVER 100.4F OR MILD 

PAIN;  Start 5/26/20 at 14:45


Enoxaparin Sodium (Lovenox 40mg Syringe) 40 mg Q24H SQ  Last administered on 

5/26/20at 15:50;  Start 5/26/20 at 15:00


Insulin Human Lispro (HumaLOG) 0-9 UNITS Q4HRS SQ  Last administered on 

5/27/20at 09:16;  Start 5/26/20 at 16:00


Dextrose (Dextrose 50%-Water Syringe) 12.5 gm PRN Q15MIN  PRN IV SEE COMMENTS;  

Start 5/26/20 at 14:45


Albuterol Sulfate (Ventolin Neb Soln) 3 mg PRN QID  PRN NEB SHORTNESS OF BREATH;

 Start 5/26/20 at 15:15


Sodium Chloride (Saline Mist Nasal) 1 néstor PRN Q1HR  PRN NS NASAL CONGESTION;  

Start 5/26/20 at 15:15


Methylprednisolone Sodium Succinate (SOLU-Medrol 40MG VIAL) 40 mg DAILY IV  Last

administered on 5/27/20at 09:10;  Start 5/26/20 at 15:15


Insulin Human Regular 100 unit/ Sodium Chloride 101 ml @ 0 mls/hr CONT  PRN IV 

SEE I/O RECORD Last administered on 5/27/20at 02:12;  Start 5/26/20 at 18:00





Active Scripts


Active


Reported


Zyrtec (Cetirizine Hcl) 10 Mg Capsule 10 Mg PO DAILY


Methotrexate (Methotrexate Sodium) 2.5 Mg Tablet 8 Tab PO WEEKLY


Protonix  ** (Pantoprazole Sodium) 40 Mg Tablet.dr 40 Mg PO DAILYAC


Folic Acid 0.8 Mg Capsule 1 Cap PO DAILY 30 Days


Ferrous Sulfate 325 Mg Tablet 1 Tab PO DAILY





Allergies


Allergies:  


Coded Allergies:  


     coconut (Verified  Allergy, Intermediate, HIVES, 5/26/20)





Vitals


VITALS





Vital Signs








  Date Time  Temp Pulse Resp B/P (MAP) Pulse Ox O2 Delivery O2 Flow Rate FiO2


 


5/27/20 09:09      Room Air  


 


5/27/20 06:32 98.4 98 20 115/58 (77) 99   





 98.4       











Labs


Labs





Laboratory Tests








Test


 5/26/20


10:00 5/26/20


11:17 5/26/20


16:28 5/26/20


18:04


 


White Blood Count


 6.3 x10^3/uL


(4.0-11.0) 


 


 





 


Red Blood Count


 5.14 x10^6/uL


(3.50-5.40) 


 


 





 


Hemoglobin


 15.0 g/dL


(12.0-15.5) 


 


 





 


Hematocrit


 42.8 %


(36.0-47.0) 


 


 





 


Mean Corpuscular Volume 83 fL ()    


 


Mean Corpuscular Hemoglobin 29 pg (25-35)    


 


Mean Corpuscular Hemoglobin


Concent 35 g/dL


(31-37) 


 


 





 


Red Cell Distribution Width


 15.9 %


(11.5-14.5) 


 


 





 


Platelet Count


 247 x10^3/uL


(140-400) 


 


 





 


Neutrophils (%) (Auto) 65 % (31-73)    


 


Lymphocytes (%) (Auto) 28 % (24-48)    


 


Monocytes (%) (Auto) 6 % (0-9)    


 


Eosinophils (%) (Auto) 1 % (0-3)    


 


Basophils (%) (Auto) 1 % (0-3)    


 


Neutrophils # (Auto)


 4.1 x10^3/uL


(1.8-7.7) 


 


 





 


Lymphocytes # (Auto)


 1.7 x10^3/uL


(1.0-4.8) 


 


 





 


Monocytes # (Auto)


 0.4 x10^3/uL


(0.0-1.1) 


 


 





 


Eosinophils # (Auto)


 0.0 x10^3/uL


(0.0-0.7) 


 


 





 


Basophils # (Auto)


 0.0 x10^3/uL


(0.0-0.2) 


 


 





 


Sodium Level


 129 mmol/L


(136-145) 


 


 





 


Potassium Level


 3.7 mmol/L


(3.5-5.1) 


 


 





 


Chloride Level


 94 mmol/L


() 


 


 





 


Carbon Dioxide Level


 14 mmol/L


(21-32) 


 


 





 


Anion Gap 21 (6-14)    


 


Blood Urea Nitrogen


 20 mg/dL


(7-20) 


 


 





 


Creatinine


 0.7 mg/dL


(0.6-1.0) 


 


 





 


Estimated GFR


(Cockcroft-Gault) 85.9 


 


 


 





 


BUN/Creatinine Ratio 29 (6-20)    


 


Glucose Level


 477 mg/dL


(70-99) 


 


 





 


Hemoglobin A1c


 13.6 %


(4.8-5.6) 


 


 





 


Calcium Level


 8.3 mg/dL


(8.5-10.1) 


 


 





 


Total Bilirubin


 1.1 mg/dL


(0.2-1.0) 


 


 





 


Aspartate Amino Transf


(AST/SGOT)  U/L (15-37) 


 


 


 





 


Alanine Aminotransferase


(ALT/SGPT)  U/L (14-59) 


 


 


 





 


Alkaline Phosphatase


 99 U/L


() 


 


 





 


Total Protein


 7.0 g/dL


(6.4-8.2) 


 


 





 


Albumin


 3.6 g/dL


(3.4-5.0) 


 


 





 


Albumin/Globulin Ratio 1.1 (1.0-1.7)    


 


Triglycerides Level


 3252 mg/dL


(0-150) 


 


 





 


Cholesterol Level


 418 mg/dL


(0-200) 


 


 





 


LDL Cholesterol, Calculated  mg/dL (0-100)    


 


VLDL Cholesterol, Calculated


 650 mg/dL


(0-40) 


 


 





 


Non-HDL Cholesterol Calculated  mg/dL (0-129)    


 


HDL Cholesterol  mg/dL (40-60)    


 


Cholesterol/HDL Ratio     


 


Lipase


 52526 U/L


() 


 


 





 


Thyroid Stimulating Hormone


(TSH) 1.635 uIU/mL


(0.358-3.74) 


 


 





 


Urine Collection Type  Unknown   


 


Urine Color  Yellow   


 


Urine Clarity  Clear   


 


Urine pH  6.0 (<5.0-8.0)   


 


Urine Specific Gravity


 


 >=1.030


(1.000-1.030) 


 





 


Urine Protein


 


 Negative mg/dL


(NEG-TRACE) 


 





 


Urine Glucose (UA)


 


 >=1000 mg/dL


(NEG) 


 





 


Urine Ketones (Stick)  40 mg/dL (NEG)   


 


Urine Blood  Negative (NEG)   


 


Urine Nitrite  Negative (NEG)   


 


Urine Bilirubin  Negative (NEG)   


 


Urine Urobilinogen Dipstick


 


 0.2 mg/dL (0.2


mg/dL) 


 





 


Urine Leukocyte Esterase  Negative (NEG)   


 


Urine RBC  Occ /HPF (0-2)   


 


Urine WBC  0 /HPF (0-4)   


 


Urine Squamous Epithelial


Cells 


 Occ /LPF 


 


 





 


Urine Bacteria  0 /HPF (0-FEW)   


 


Glucose (Fingerstick)


 


 


 391 mg/dL


(70-99) 339 mg/dL


(70-99)


 


Test


 5/26/20


19:04 5/26/20


20:03 5/26/20


21:06 5/26/20


22:00


 


Glucose (Fingerstick)


 338 mg/dL


(70-99) 315 mg/dL


(70-99) 278 mg/dL


(70-99) 229 mg/dL


(70-99)


 


Test


 5/26/20


23:00 5/27/20


00:00 5/27/20


00:50 5/27/20


02:03


 


Glucose (Fingerstick)


 190 mg/dL


(70-99) 182 mg/dL


(70-99) 174 mg/dL


(70-99) 142 mg/dL


(70-99)


 


Test


 5/27/20


03:05 5/27/20


04:00 5/27/20


06:52 





 


Glucose (Fingerstick)


 137 mg/dL


(70-99) 


 273 mg/dL


(70-99) 





 


White Blood Count


 


 4.5 x10^3/uL


(4.0-11.0) 


 





 


Red Blood Count


 


 4.87 x10^6/uL


(3.50-5.40) 


 





 


Hemoglobin


 


 13.8 g/dL


(12.0-15.5) 


 





 


Hematocrit


 


 40.4 %


(36.0-47.0) 


 





 


Mean Corpuscular Volume  83 fL ()   


 


Mean Corpuscular Hemoglobin  28 pg (25-35)   


 


Mean Corpuscular Hemoglobin


Concent 


 34 g/dL


(31-37) 


 





 


Red Cell Distribution Width


 


 16.2 %


(11.5-14.5) 


 





 


Platelet Count


 


 217 x10^3/uL


(140-400) 


 





 


Neutrophils (%) (Auto)  66 % (31-73)   


 


Lymphocytes (%) (Auto)  17 % (24-48)   


 


Monocytes (%) (Auto)  16 % (0-9)   


 


Eosinophils (%) (Auto)  0 % (0-3)   


 


Basophils (%) (Auto)  1 % (0-3)   


 


Neutrophils # (Auto)


 


 2.9 x10^3/uL


(1.8-7.7) 


 





 


Lymphocytes # (Auto)


 


 0.8 x10^3/uL


(1.0-4.8) 


 





 


Monocytes # (Auto)


 


 0.7 x10^3/uL


(0.0-1.1) 


 





 


Eosinophils # (Auto)


 


 0.0 x10^3/uL


(0.0-0.7) 


 





 


Basophils # (Auto)


 


 0.0 x10^3/uL


(0.0-0.2) 


 





 


Sodium Level


 


 139 mmol/L


(136-145) 


 





 


Potassium Level


 


 4.2 mmol/L


(3.5-5.1) 


 





 


Chloride Level


 


 106 mmol/L


() 


 





 


Carbon Dioxide Level


 


 25 mmol/L


(21-32) 


 





 


Anion Gap  8 (6-14)   


 


Blood Urea Nitrogen


 


 16 mg/dL


(7-20) 


 





 


Creatinine


 


 0.8 mg/dL


(0.6-1.0) 


 





 


Estimated GFR


(Cockcroft-Gault) 


 73.7 


 


 





 


BUN/Creatinine Ratio  20 (6-20)   


 


Glucose Level


 


 215 mg/dL


(70-99) 


 





 


Calcium Level


 


 7.8 mg/dL


(8.5-10.1) 


 





 


Total Bilirubin


 


 0.8 mg/dL


(0.2-1.0) 


 





 


Aspartate Amino Transf


(AST/SGOT) 


 22 U/L (15-37) 


 


 





 


Alanine Aminotransferase


(ALT/SGPT) 


 30 U/L (14-59) 


 


 





 


Alkaline Phosphatase


 


 78 U/L


() 


 





 


Total Protein


 


 6.4 g/dL


(6.4-8.2) 


 





 


Albumin


 


 2.9 g/dL


(3.4-5.0) 


 





 


Albumin/Globulin Ratio  0.8 (1.0-1.7)   


 


Lipase


 


 2384 U/L


() 


 











Laboratory Tests








Test


 5/26/20


10:00 5/26/20


11:17 5/26/20


16:28 5/26/20


18:04


 


White Blood Count


 6.3 x10^3/uL


(4.0-11.0) 


 


 





 


Red Blood Count


 5.14 x10^6/uL


(3.50-5.40) 


 


 





 


Hemoglobin


 15.0 g/dL


(12.0-15.5) 


 


 





 


Hematocrit


 42.8 %


(36.0-47.0) 


 


 





 


Mean Corpuscular Volume 83 fL ()    


 


Mean Corpuscular Hemoglobin 29 pg (25-35)    


 


Mean Corpuscular Hemoglobin


Concent 35 g/dL


(31-37) 


 


 





 


Red Cell Distribution Width


 15.9 %


(11.5-14.5) 


 


 





 


Platelet Count


 247 x10^3/uL


(140-400) 


 


 





 


Neutrophils (%) (Auto) 65 % (31-73)    


 


Lymphocytes (%) (Auto) 28 % (24-48)    


 


Monocytes (%) (Auto) 6 % (0-9)    


 


Eosinophils (%) (Auto) 1 % (0-3)    


 


Basophils (%) (Auto) 1 % (0-3)    


 


Neutrophils # (Auto)


 4.1 x10^3/uL


(1.8-7.7) 


 


 





 


Lymphocytes # (Auto)


 1.7 x10^3/uL


(1.0-4.8) 


 


 





 


Monocytes # (Auto)


 0.4 x10^3/uL


(0.0-1.1) 


 


 





 


Eosinophils # (Auto)


 0.0 x10^3/uL


(0.0-0.7) 


 


 





 


Basophils # (Auto)


 0.0 x10^3/uL


(0.0-0.2) 


 


 





 


Sodium Level


 129 mmol/L


(136-145) 


 


 





 


Potassium Level


 3.7 mmol/L


(3.5-5.1) 


 


 





 


Chloride Level


 94 mmol/L


() 


 


 





 


Carbon Dioxide Level


 14 mmol/L


(21-32) 


 


 





 


Anion Gap 21 (6-14)    


 


Blood Urea Nitrogen


 20 mg/dL


(7-20) 


 


 





 


Creatinine


 0.7 mg/dL


(0.6-1.0) 


 


 





 


Estimated GFR


(Cockcroft-Gault) 85.9 


 


 


 





 


BUN/Creatinine Ratio 29 (6-20)    


 


Glucose Level


 477 mg/dL


(70-99) 


 


 





 


Hemoglobin A1c


 13.6 %


(4.8-5.6) 


 


 





 


Calcium Level


 8.3 mg/dL


(8.5-10.1) 


 


 





 


Total Bilirubin


 1.1 mg/dL


(0.2-1.0) 


 


 





 


Aspartate Amino Transf


(AST/SGOT)  U/L (15-37) 


 


 


 





 


Alanine Aminotransferase


(ALT/SGPT)  U/L (14-59) 


 


 


 





 


Alkaline Phosphatase


 99 U/L


() 


 


 





 


Total Protein


 7.0 g/dL


(6.4-8.2) 


 


 





 


Albumin


 3.6 g/dL


(3.4-5.0) 


 


 





 


Albumin/Globulin Ratio 1.1 (1.0-1.7)    


 


Triglycerides Level


 3252 mg/dL


(0-150) 


 


 





 


Cholesterol Level


 418 mg/dL


(0-200) 


 


 





 


LDL Cholesterol, Calculated  mg/dL (0-100)    


 


VLDL Cholesterol, Calculated


 650 mg/dL


(0-40) 


 


 





 


Non-HDL Cholesterol Calculated  mg/dL (0-129)    


 


HDL Cholesterol  mg/dL (40-60)    


 


Cholesterol/HDL Ratio     


 


Lipase


 39227 U/L


() 


 


 





 


Thyroid Stimulating Hormone


(TSH) 1.635 uIU/mL


(0.358-3.74) 


 


 





 


Urine Collection Type  Unknown   


 


Urine Color  Yellow   


 


Urine Clarity  Clear   


 


Urine pH  6.0 (<5.0-8.0)   


 


Urine Specific Gravity


 


 >=1.030


(1.000-1.030) 


 





 


Urine Protein


 


 Negative mg/dL


(NEG-TRACE) 


 





 


Urine Glucose (UA)


 


 >=1000 mg/dL


(NEG) 


 





 


Urine Ketones (Stick)  40 mg/dL (NEG)   


 


Urine Blood  Negative (NEG)   


 


Urine Nitrite  Negative (NEG)   


 


Urine Bilirubin  Negative (NEG)   


 


Urine Urobilinogen Dipstick


 


 0.2 mg/dL (0.2


mg/dL) 


 





 


Urine Leukocyte Esterase  Negative (NEG)   


 


Urine RBC  Occ /HPF (0-2)   


 


Urine WBC  0 /HPF (0-4)   


 


Urine Squamous Epithelial


Cells 


 Occ /LPF 


 


 





 


Urine Bacteria  0 /HPF (0-FEW)   


 


Glucose (Fingerstick)


 


 


 391 mg/dL


(70-99) 339 mg/dL


(70-99)


 


Test


 5/26/20


19:04 5/26/20


20:03 5/26/20


21:06 5/26/20


22:00


 


Glucose (Fingerstick)


 338 mg/dL


(70-99) 315 mg/dL


(70-99) 278 mg/dL


(70-99) 229 mg/dL


(70-99)


 


Test


 5/26/20


23:00 5/27/20


00:00 5/27/20


00:50 5/27/20


02:03


 


Glucose (Fingerstick)


 190 mg/dL


(70-99) 182 mg/dL


(70-99) 174 mg/dL


(70-99) 142 mg/dL


(70-99)


 


Test


 5/27/20


03:05 5/27/20


04:00 5/27/20


06:52 





 


Glucose (Fingerstick)


 137 mg/dL


(70-99) 


 273 mg/dL


(70-99) 





 


White Blood Count


 


 4.5 x10^3/uL


(4.0-11.0) 


 





 


Red Blood Count


 


 4.87 x10^6/uL


(3.50-5.40) 


 





 


Hemoglobin


 


 13.8 g/dL


(12.0-15.5) 


 





 


Hematocrit


 


 40.4 %


(36.0-47.0) 


 





 


Mean Corpuscular Volume  83 fL ()   


 


Mean Corpuscular Hemoglobin  28 pg (25-35)   


 


Mean Corpuscular Hemoglobin


Concent 


 34 g/dL


(31-37) 


 





 


Red Cell Distribution Width


 


 16.2 %


(11.5-14.5) 


 





 


Platelet Count


 


 217 x10^3/uL


(140-400) 


 





 


Neutrophils (%) (Auto)  66 % (31-73)   


 


Lymphocytes (%) (Auto)  17 % (24-48)   


 


Monocytes (%) (Auto)  16 % (0-9)   


 


Eosinophils (%) (Auto)  0 % (0-3)   


 


Basophils (%) (Auto)  1 % (0-3)   


 


Neutrophils # (Auto)


 


 2.9 x10^3/uL


(1.8-7.7) 


 





 


Lymphocytes # (Auto)


 


 0.8 x10^3/uL


(1.0-4.8) 


 





 


Monocytes # (Auto)


 


 0.7 x10^3/uL


(0.0-1.1) 


 





 


Eosinophils # (Auto)


 


 0.0 x10^3/uL


(0.0-0.7) 


 





 


Basophils # (Auto)


 


 0.0 x10^3/uL


(0.0-0.2) 


 





 


Sodium Level


 


 139 mmol/L


(136-145) 


 





 


Potassium Level


 


 4.2 mmol/L


(3.5-5.1) 


 





 


Chloride Level


 


 106 mmol/L


() 


 





 


Carbon Dioxide Level


 


 25 mmol/L


(21-32) 


 





 


Anion Gap  8 (6-14)   


 


Blood Urea Nitrogen


 


 16 mg/dL


(7-20) 


 





 


Creatinine


 


 0.8 mg/dL


(0.6-1.0) 


 





 


Estimated GFR


(Cockcroft-Gault) 


 73.7 


 


 





 


BUN/Creatinine Ratio  20 (6-20)   


 


Glucose Level


 


 215 mg/dL


(70-99) 


 





 


Calcium Level


 


 7.8 mg/dL


(8.5-10.1) 


 





 


Total Bilirubin


 


 0.8 mg/dL


(0.2-1.0) 


 





 


Aspartate Amino Transf


(AST/SGOT) 


 22 U/L (15-37) 


 


 





 


Alanine Aminotransferase


(ALT/SGPT) 


 30 U/L (14-59) 


 


 





 


Alkaline Phosphatase


 


 78 U/L


() 


 





 


Total Protein


 


 6.4 g/dL


(6.4-8.2) 


 





 


Albumin


 


 2.9 g/dL


(3.4-5.0) 


 





 


Albumin/Globulin Ratio  0.8 (1.0-1.7)   


 


Lipase


 


 2384 U/L


() 


 














Assessment/Plan


Assessment/Plan


Acute pancreatitis- autoimmune and/or hyperlipdiemia lead differential. 

Malignancy possible as well. Await Igg markers, fasting lipid paenl and Ca 19-9 

level. Medical therapy in interim


Full note dictated











REVA HENNING MD             May 27, 2020 09:39

## 2020-05-27 NOTE — NUR
SS following for discharge planning. SS reviewed pt chart and discussed with pt RN. Pt is 
from home with spouse and is currently on room air. SS will continue to follow for discharge 
planning.

## 2020-05-27 NOTE — CONS
DATE OF CONSULTATION:  05/27/2020



GASTROENTEROLOGY CONSULTATION



REASON FOR CONSULTATION:  Acute pancreatitis.



HISTORY OF PRESENT ILLNESS:  This is a 59-year-old  female with past

medical history significant for celiac disease, Still disease, asthma, reflux,

is admitted with acute pancreatitis:  She is a nondrinker, status post

cholecystectomy with normal labs, but does have hyperglycemia.  She denies any

history of alcohol use.  Denies a family history of pancreatitis.  Weight and

appetite otherwise have been stable.  She is without additional complaints.



PAST MEDICAL HISTORY:  Asthma, GERD, celiac disease, anemia, and Still disease.



PAST SURGICAL HISTORY:  Status post cholecystectomy, hysterectomy, and right

shoulder arthroscopy.



FAMILY HISTORY:  Significant for diabetes.



SOCIAL HISTORY:  She is an ex-smoker, nondrinker.



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



ALLERGIES:  None.



REVIEW OF SYSTEMS:  Per records.



PHYSICAL EXAMINATION:

GENERAL:  Reveals a well-nourished, well-developed  female who is in no

acute distress.

VITAL SIGNS:  Temperature 98.4, pulse 98, respiratory rate 20, blood pressure is

115/58.

LUNGS:  Clear.

CARDIOVASCULAR:  Reveals an S1, S2 without S3, S4 or appreciable murmur.

ABDOMEN:  Soft abdomen, normal bowel sounds, with epigastric tenderness to deep

palpation without appreciable hepatosplenomegaly.

EXTREMITIES:  Reveal no cyanosis, clubbing or edema.



LABORATORY STUDIES:  Sodium 139, potassium 4.2, chloride 106, BUN is 16,

creatinine 0.8, glucose 215, calcium is 7.8, and total bilirubin 0.8.  AST of

22, ALT of 30, alkaline phosphatase 78, total protein 6.4, albumin 2.9, lipase

this morning 2384.  Fasting lipid panel is pending as well as an IgG4 level.



IMPRESSION AND PLAN:  Acute pancreatitis, status post cholecystectomy,

autoimmune disease, and hypertriglyceridemia lead the differential.  Recommend

medical therapy, fluids, analgesics, antiemetics, pending additional studies.



I would like to thank Dr. Perkins for allowing us to consult and participate in

the patient's care.

 



______________________________

REVA HENNING MD



DR:  SSP/faisal  JOB#:  [id number] / [id number]

DD:  05/27/2020 09:37  DT:  05/27/2020 10:09

## 2020-05-28 VITALS — DIASTOLIC BLOOD PRESSURE: 60 MMHG | SYSTOLIC BLOOD PRESSURE: 135 MMHG

## 2020-05-28 VITALS — SYSTOLIC BLOOD PRESSURE: 128 MMHG | DIASTOLIC BLOOD PRESSURE: 65 MMHG

## 2020-05-28 VITALS — SYSTOLIC BLOOD PRESSURE: 115 MMHG | DIASTOLIC BLOOD PRESSURE: 62 MMHG

## 2020-05-28 VITALS — SYSTOLIC BLOOD PRESSURE: 131 MMHG | DIASTOLIC BLOOD PRESSURE: 75 MMHG

## 2020-05-28 VITALS — SYSTOLIC BLOOD PRESSURE: 132 MMHG | DIASTOLIC BLOOD PRESSURE: 57 MMHG

## 2020-05-28 VITALS — DIASTOLIC BLOOD PRESSURE: 60 MMHG | SYSTOLIC BLOOD PRESSURE: 120 MMHG

## 2020-05-28 LAB
% BANDS: 7 % (ref 0–9)
% LYMPHS: 54 % (ref 24–48)
% MONOS: 13 % (ref 0–10)
% SEGS: 25 % (ref 35–66)
ALBUMIN SERPL-MCNC: 2.8 G/DL (ref 3.4–5)
ALBUMIN/GLOB SERPL: 0.9 {RATIO} (ref 1–1.7)
ALP SERPL-CCNC: 70 U/L (ref 46–116)
ALT SERPL-CCNC: 27 U/L (ref 14–59)
AMYLASE SERPL-CCNC: 96 U/L (ref 25–115)
ANION GAP SERPL CALC-SCNC: 12 MMOL/L (ref 6–14)
AST SERPL-CCNC: 19 U/L (ref 15–37)
BASOPHILS # BLD AUTO: 0 X10^3/UL (ref 0–0.2)
BASOPHILS NFR BLD: 0 % (ref 0–3)
BILIRUB SERPL-MCNC: 1.1 MG/DL (ref 0.2–1)
BUN SERPL-MCNC: 18 MG/DL (ref 7–20)
BUN/CREAT SERPL: 36 (ref 6–20)
CALCIUM SERPL-MCNC: 8.1 MG/DL (ref 8.5–10.1)
CHLORIDE SERPL-SCNC: 110 MMOL/L (ref 98–107)
CO2 SERPL-SCNC: 24 MMOL/L (ref 21–32)
CREAT SERPL-MCNC: 0.5 MG/DL (ref 0.6–1)
EOSINOPHIL NFR BLD AUTO: 1 % (ref 0–5)
EOSINOPHIL NFR BLD: 0 X10^3/UL (ref 0–0.7)
EOSINOPHIL NFR BLD: 2 % (ref 0–3)
ERYTHROCYTE [DISTWIDTH] IN BLOOD BY AUTOMATED COUNT: 16.7 % (ref 11.5–14.5)
GFR SERPLBLD BASED ON 1.73 SQ M-ARVRAT: 126.7 ML/MIN
GLOBULIN SER-MCNC: 3 G/DL (ref 2.2–3.8)
GLUCOSE SERPL-MCNC: 218 MG/DL (ref 70–99)
HCT VFR BLD CALC: 38.1 % (ref 36–47)
HGB BLD-MCNC: 12.6 G/DL (ref 12–15.5)
LIPASE: 1035 U/L (ref 73–393)
LYMPHOCYTES # BLD: 1 X10^3/UL (ref 1–4.8)
LYMPHOCYTES NFR BLD AUTO: 53 % (ref 24–48)
MCH RBC QN AUTO: 28 PG (ref 25–35)
MCHC RBC AUTO-ENTMCNC: 33 G/DL (ref 31–37)
MCV RBC AUTO: 85 FL (ref 79–100)
MONO #: 0.5 X10^3/UL (ref 0–1.1)
MONOCYTES NFR BLD: 26 % (ref 0–9)
NEUT #: 0.4 X10^3/UL (ref 1.8–7.7)
NEUTROPHILS NFR BLD AUTO: 19 % (ref 31–73)
PLATELET # BLD AUTO: 178 X10^3/UL (ref 140–400)
PLATELET # BLD EST: ADEQUATE 10*3/UL
POTASSIUM SERPL-SCNC: 3.9 MMOL/L (ref 3.5–5.1)
PROT SERPL-MCNC: 5.8 G/DL (ref 6.4–8.2)
RBC # BLD AUTO: 4.49 X10^6/UL (ref 3.5–5.4)
SODIUM SERPL-SCNC: 146 MMOL/L (ref 136–145)
WBC # BLD AUTO: 1.8 X10^3/UL (ref 4–11)

## 2020-05-28 RX ADMIN — INSULIN LISPRO SCH UNITS: 100 INJECTION, SOLUTION INTRAVENOUS; SUBCUTANEOUS at 12:10

## 2020-05-28 RX ADMIN — MORPHINE SULFATE PRN MG: 4 INJECTION, SOLUTION INTRAMUSCULAR; INTRAVENOUS at 16:18

## 2020-05-28 RX ADMIN — INSULIN LISPRO SCH UNITS: 100 INJECTION, SOLUTION INTRAVENOUS; SUBCUTANEOUS at 17:00

## 2020-05-28 RX ADMIN — FENTANYL CITRATE PRN MCG: 50 INJECTION INTRAMUSCULAR; INTRAVENOUS at 13:14

## 2020-05-28 RX ADMIN — BACITRACIN SCH MLS/HR: 5000 INJECTION, POWDER, FOR SOLUTION INTRAMUSCULAR at 09:39

## 2020-05-28 RX ADMIN — INSULIN LISPRO SCH UNITS: 100 INJECTION, SOLUTION INTRAVENOUS; SUBCUTANEOUS at 20:00

## 2020-05-28 RX ADMIN — FENTANYL CITRATE PRN MCG: 50 INJECTION INTRAMUSCULAR; INTRAVENOUS at 19:56

## 2020-05-28 RX ADMIN — INSULIN LISPRO SCH UNITS: 100 INJECTION, SOLUTION INTRAVENOUS; SUBCUTANEOUS at 04:09

## 2020-05-28 RX ADMIN — FAMOTIDINE SCH MG: 10 INJECTION, SOLUTION INTRAVENOUS at 19:45

## 2020-05-28 RX ADMIN — INSULIN LISPRO SCH UNITS: 100 INJECTION, SOLUTION INTRAVENOUS; SUBCUTANEOUS at 08:00

## 2020-05-28 RX ADMIN — BACITRACIN SCH MLS/HR: 5000 INJECTION, POWDER, FOR SOLUTION INTRAMUSCULAR at 06:44

## 2020-05-28 RX ADMIN — FENTANYL CITRATE PRN MCG: 50 INJECTION INTRAMUSCULAR; INTRAVENOUS at 09:38

## 2020-05-28 RX ADMIN — FENTANYL CITRATE PRN MCG: 50 INJECTION INTRAMUSCULAR; INTRAVENOUS at 06:32

## 2020-05-28 RX ADMIN — BACITRACIN SCH MLS/HR: 5000 INJECTION, POWDER, FOR SOLUTION INTRAMUSCULAR at 19:44

## 2020-05-28 RX ADMIN — ENOXAPARIN SODIUM SCH MG: 40 INJECTION SUBCUTANEOUS at 15:27

## 2020-05-28 RX ADMIN — FENTANYL CITRATE PRN MCG: 50 INJECTION INTRAMUSCULAR; INTRAVENOUS at 02:41

## 2020-05-28 RX ADMIN — INSULIN LISPRO SCH UNITS: 100 INJECTION, SOLUTION INTRAVENOUS; SUBCUTANEOUS at 00:00

## 2020-05-28 RX ADMIN — METHYLPREDNISOLONE SODIUM SUCCINATE SCH MG: 40 INJECTION, POWDER, FOR SOLUTION INTRAMUSCULAR; INTRAVENOUS at 08:21

## 2020-05-28 NOTE — NUR
SS following up with discharge planning. SS reviewed pt chart and discussed with pt RN. Pt 
is currently on room air. Pt will discharge to home when ready. SS will continue to follow 
for discharge planning.

## 2020-05-28 NOTE — PDOC
Subjective:


Subjective:


Abdomen still sore - pain meds help.


Not hungry.





Objective:


Vital Signs:





                                   Vital Signs








  Date Time  Temp Pulse Resp B/P (MAP) Pulse Ox O2 Delivery O2 Flow Rate FiO2


 


5/28/20 09:38      Room Air  


 


5/28/20 07:02   20     


 


5/28/20 07:00 97.7 95  132/57 (82) 100   





 97.7       


 


5/28/20 03:11       2.0 








Labs:





Laboratory Tests








Test


 5/27/20


11:12 5/27/20


15:45 5/27/20


20:06 5/27/20


23:54


 


Glucose (Fingerstick)


 257 mg/dL


(70-99) 243 mg/dL


(70-99) 256 mg/dL


(70-99) 193 mg/dL


(70-99)


 


Test


 5/28/20


03:52 5/28/20


07:43 


 





 


Glucose (Fingerstick)


 216 mg/dL


(70-99) 176 mg/dL


(70-99) 


 











Imaging:


CT A/P


Mild bibasilar costophrenic sulcus atelectasis is present. Bilateral carotid 

cholecystectomy noted. Liver and spleen are unremarkable. At the inferior tip of

the right hepatic lobe, there is a very small to characterize low-attenuation 

lesion which is present on axial image 49. This may represent a cyst or other 

benign process. Stranding about the pancreas with surrounding edema is noted. 

Edema extends along the left lateral conal fascia.Low-attenuation involving the 

distal duodenum on axial image 49 is present measuring 0.6 cm diameter. Within 

the right proximal jejunum on coronal 


image 23, there is a linear fat attenuation structure measuring 2.1 cm x 0.4 cm.

These have the appearance of benign lipomas. There is no bowel obstruction. No 

extraluminal gas. Moderate quantity of stool in the colon is present. Appendix 

is normal. No inflammatory change about the cecum. 


Urinary bladder is unremarkable. Lower lumbar spine facet joint degenerative 

remodeling is present. Transitional L5 vertebra is present.Hysterectomy noted.


Impression:


Findings of acute pancreatitis. No loculated collections. No biliary dilatation.

Distal duodenal and proximal jejunal intraluminal lipomas.





PE:





GEN: NAD


LUNGS: CTAB


HEART: RRR


ABD: quiet, soft, epigastric to LUQ discomfort (mild)


NEURO/PSYCH: A & O 3





A/P:


Pancreatitis, hypertriglyceridemia


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


Celiac disease - says diagnosed w/ [facility] this year, recently started gluten free 

diet


DM - new, A1c 13.6


S/p cholecystectomy


Distal duodenal and proximal jejunal intraluminal lipomas - noted on CT


Leukopenia





--


Still has pain - okay to try sip of water and ice chips.


Empiric acid-reducer - IV for now.


Reviewed H&P and will review lipoma findings on CT w/ Dr. Parra.





Hemodynamically unstable?:  No


Is patient in severe pain?:  No


Is NPO status required?:  Yes











AILYN OWEN         May 28, 2020 10:06

## 2020-05-28 NOTE — PDOC
PROGRESS NOTES


Chief Complaint


Chief Complaint


Acute pancreatiti


Duodenal and jejunal intraluminal lipomas


Hyponatremia


Hyperglycemia 


Celiac disease 


Adult onset stills disease


Anemi


Asthma


NATO on CPAP


GERD 


Leukopenia





History of Present Illness


History of Present Illness


Ms Almanza is a 60yo F [employer]  w/ PMHx celiac disease, adult onset stills 

disease, Anemia, Asthma, NATO on CPAP, GERD who presents with severe upper 

abdominal pain, nausea, vomiting, fatigue. She states this started suddenly this

morning while she was working from home on teleconference (works as a 

on the  base). She only had oatmeal for breakfast.


She has never had anything similar to this in the past.  She generally feels 

unwell.  She is not had any fever, diarrhea, chest pain, shortness of breath, 

dysuria, hematuria, blood in her stools.  She denies alcohol abuse.  Pain does 

not move anywhere.  Nothing seems to make it better or worse.  She has not tried

anything at home.  He does not drink alcohol, and is status post 

cholecystectomy.  No calcium disorders.


She takes 20mg methotrexate weekly on Fridays and is on 3 mg of chronic 

prednisone for her stills disease.  She was diagnosed with celiac disease 

January 2020 and has just recently started on a gluten-free diet.  No recent 

sick contacts that she can recall.


Labs significant for lipase 38219, glucose 477, , bilirubin 1.1, Calcium 

8.3, K 3.7, WBC 6.3, Hb 15, Platelets 247


CT abdomen pelvis shows surgically absent gallbladder and surgically absent 

uterus and describes duodenal and jejunal intraluminal lipomas. Findings of 

acute pancreatitis. No loculated collections. No biliary dilatation.


Admitted for further treatment.





5/27: Lipase down. A1c 13.6, Triglycerides 3252. Started on insulin gtt.





Abdominal pain and glucose improved.  Having ice chips per GI.  Afebrile.  No CP

or SOB.





Vitals


Vitals





Vital Signs








  Date Time  Temp Pulse Resp B/P (MAP) Pulse Ox O2 Delivery O2 Flow Rate FiO2


 


5/28/20 07:02   20   Room Air  


 


5/28/20 03:11       2.0 


 


5/28/20 02:53 98.6 95  128/65 (86) 92   





 98.6       











Physical Exam


General:  Alert, Oriented X3, Cooperative, moderate distress


Abdomen:  Normal bowel sounds, Soft, No hepatosplenomegaly, No masses, Other 

(Diffuse tenderness)


Extremities:  No clubbing, No cyanosis, No edema, Normal pulses, No tenderness/s

welling


Skin:  No rashes, No breakdown, No significant lesion





Labs


LABS





Laboratory Tests








Test


 5/27/20


11:12 5/27/20


15:45 5/27/20


20:06 5/27/20


23:54


 


Glucose (Fingerstick)


 257 mg/dL


(70-99) 243 mg/dL


(70-99) 256 mg/dL


(70-99) 193 mg/dL


(70-99)


 


Test


 5/28/20


03:52 5/28/20


04:55 5/28/20


07:43 





 


Glucose (Fingerstick)


 216 mg/dL


(70-99) 


 176 mg/dL


(70-99) 





 


White Blood Count


 


 1.8 x10^3/uL


(4.0-11.0) 


 





 


Red Blood Count


 


 4.49 x10^6/uL


(3.50-5.40) 


 





 


Hemoglobin


 


 12.6 g/dL


(12.0-15.5) 


 





 


Hematocrit


 


 38.1 %


(36.0-47.0) 


 





 


Mean Corpuscular Volume  85 fL ()   


 


Mean Corpuscular Hemoglobin  28 pg (25-35)   


 


Mean Corpuscular Hemoglobin


Concent 


 33 g/dL


(31-37) 


 





 


Red Cell Distribution Width


 


 16.7 %


(11.5-14.5) 


 





 


Platelet Count


 


 178 x10^3/uL


(140-400) 


 





 


Neutrophils (%) (Auto)  19 % (31-73)   


 


Lymphocytes (%) (Auto)  53 % (24-48)   


 


Monocytes (%) (Auto)  26 % (0-9)   


 


Eosinophils (%) (Auto)  2 % (0-3)   


 


Basophils (%) (Auto)  0 % (0-3)   


 


Neutrophils # (Auto)


 


 0.4 x10^3/uL


(1.8-7.7) 


 





 


Lymphocytes # (Auto)


 


 1.0 x10^3/uL


(1.0-4.8) 


 





 


Monocytes # (Auto)


 


 0.5 x10^3/uL


(0.0-1.1) 


 





 


Eosinophils # (Auto)


 


 0.0 x10^3/uL


(0.0-0.7) 


 





 


Basophils # (Auto)


 


 0.0 x10^3/uL


(0.0-0.2) 


 





 


Segmented Neutrophils %  25 % (35-66)   


 


Band Neutrophils %  7 % (0-9)   


 


Lymphocytes %  54 % (24-48)   


 


Monocytes %  13 % (0-10)   


 


Eosinophils %  1 % (0-5)   


 


Platelet Estimate


 


 Adequate


(ADEQUATE) 


 





 


Sodium Level


 


 146 mmol/L


(136-145) 


 





 


Potassium Level


 


 3.9 mmol/L


(3.5-5.1) 


 





 


Chloride Level


 


 110 mmol/L


() 


 





 


Carbon Dioxide Level


 


 24 mmol/L


(21-32) 


 





 


Anion Gap  12 (6-14)   


 


Blood Urea Nitrogen


 


 18 mg/dL


(7-20) 


 





 


Creatinine


 


 0.5 mg/dL


(0.6-1.0) 


 





 


Estimated GFR


(Cockcroft-Gault) 


 126.7 


 


 





 


BUN/Creatinine Ratio  36 (6-20)   


 


Glucose Level


 


 218 mg/dL


(70-99) 


 





 


Calcium Level


 


 8.1 mg/dL


(8.5-10.1) 


 





 


Total Bilirubin


 


 1.1 mg/dL


(0.2-1.0) 


 





 


Aspartate Amino Transf


(AST/SGOT) 


 19 U/L (15-37) 


 


 





 


Alanine Aminotransferase


(ALT/SGPT) 


 27 U/L (14-59) 


 


 





 


Alkaline Phosphatase


 


 70 U/L


() 


 





 


Total Protein


 


 5.8 g/dL


(6.4-8.2) 


 





 


Albumin


 


 2.8 g/dL


(3.4-5.0) 


 





 


Albumin/Globulin Ratio  0.9 (1.0-1.7)   


 


Amylase Level


 


 96 U/L


() 


 





 


Lipase


 


 1035 U/L


() 


 














Assessment and Plan


Assessmemt and Plan


Problems


Medical Problems:


(1) Dehydration


Status: Acute  





(2) Hyperglycemia


Status: Acute  





(3) Pancreatitis, acute


Status: Acute  











Comment


Review of Relevant


I have reviewed the following items elizabeth (where applicable) has been applied.


Labs





Laboratory Tests








Test


 5/26/20


10:00 5/26/20


11:17 5/26/20


16:28 5/26/20


18:04


 


White Blood Count


 6.3 x10^3/uL


(4.0-11.0) 


 


 





 


Red Blood Count


 5.14 x10^6/uL


(3.50-5.40) 


 


 





 


Hemoglobin


 15.0 g/dL


(12.0-15.5) 


 


 





 


Hematocrit


 42.8 %


(36.0-47.0) 


 


 





 


Mean Corpuscular Volume 83 fL ()    


 


Mean Corpuscular Hemoglobin 29 pg (25-35)    


 


Mean Corpuscular Hemoglobin


Concent 35 g/dL


(31-37) 


 


 





 


Red Cell Distribution Width


 15.9 %


(11.5-14.5) 


 


 





 


Platelet Count


 247 x10^3/uL


(140-400) 


 


 





 


Neutrophils (%) (Auto) 65 % (31-73)    


 


Lymphocytes (%) (Auto) 28 % (24-48)    


 


Monocytes (%) (Auto) 6 % (0-9)    


 


Eosinophils (%) (Auto) 1 % (0-3)    


 


Basophils (%) (Auto) 1 % (0-3)    


 


Neutrophils # (Auto)


 4.1 x10^3/uL


(1.8-7.7) 


 


 





 


Lymphocytes # (Auto)


 1.7 x10^3/uL


(1.0-4.8) 


 


 





 


Monocytes # (Auto)


 0.4 x10^3/uL


(0.0-1.1) 


 


 





 


Eosinophils # (Auto)


 0.0 x10^3/uL


(0.0-0.7) 


 


 





 


Basophils # (Auto)


 0.0 x10^3/uL


(0.0-0.2) 


 


 





 


Sodium Level


 129 mmol/L


(136-145) 


 


 





 


Potassium Level


 3.7 mmol/L


(3.5-5.1) 


 


 





 


Chloride Level


 94 mmol/L


() 


 


 





 


Carbon Dioxide Level


 14 mmol/L


(21-32) 


 


 





 


Anion Gap 21 (6-14)    


 


Blood Urea Nitrogen


 20 mg/dL


(7-20) 


 


 





 


Creatinine


 0.7 mg/dL


(0.6-1.0) 


 


 





 


Estimated GFR


(Cockcroft-Gault) 85.9 


 


 


 





 


BUN/Creatinine Ratio 29 (6-20)    


 


Glucose Level


 477 mg/dL


(70-99) 


 


 





 


Hemoglobin A1c


 13.6 %


(4.8-5.6) 


 


 





 


Calcium Level


 8.3 mg/dL


(8.5-10.1) 


 


 





 


Total Bilirubin


 1.1 mg/dL


(0.2-1.0) 


 


 





 


Aspartate Amino Transf


(AST/SGOT)  U/L (15-37) 


 


 


 





 


Alanine Aminotransferase


(ALT/SGPT)  U/L (14-59) 


 


 


 





 


Alkaline Phosphatase


 99 U/L


() 


 


 





 


Total Protein


 7.0 g/dL


(6.4-8.2) 


 


 





 


Albumin


 3.6 g/dL


(3.4-5.0) 


 


 





 


Albumin/Globulin Ratio 1.1 (1.0-1.7)    


 


Triglycerides Level


 3252 mg/dL


(0-150) 


 


 





 


Cholesterol Level


 418 mg/dL


(0-200) 


 


 





 


LDL Cholesterol, Calculated  mg/dL (0-100)    


 


VLDL Cholesterol, Calculated


 650 mg/dL


(0-40) 


 


 





 


Non-HDL Cholesterol Calculated  mg/dL (0-129)    


 


HDL Cholesterol  mg/dL (40-60)    


 


Cholesterol/HDL Ratio     


 


Lipase


 61795 U/L


() 


 


 





 


Thyroid Stimulating Hormone


(TSH) 1.635 uIU/mL


(0.358-3.74) 


 


 





 


Urine Collection Type  Unknown   


 


Urine Color  Yellow   


 


Urine Clarity  Clear   


 


Urine pH  6.0 (<5.0-8.0)   


 


Urine Specific Gravity


 


 >=1.030


(1.000-1.030) 


 





 


Urine Protein


 


 Negative mg/dL


(NEG-TRACE) 


 





 


Urine Glucose (UA)


 


 >=1000 mg/dL


(NEG) 


 





 


Urine Ketones (Stick)  40 mg/dL (NEG)   


 


Urine Blood  Negative (NEG)   


 


Urine Nitrite  Negative (NEG)   


 


Urine Bilirubin  Negative (NEG)   


 


Urine Urobilinogen Dipstick


 


 0.2 mg/dL (0.2


mg/dL) 


 





 


Urine Leukocyte Esterase  Negative (NEG)   


 


Urine RBC  Occ /HPF (0-2)   


 


Urine WBC  0 /HPF (0-4)   


 


Urine Squamous Epithelial


Cells 


 Occ /LPF 


 


 





 


Urine Bacteria  0 /HPF (0-FEW)   


 


Glucose (Fingerstick)


 


 


 391 mg/dL


(70-99) 339 mg/dL


(70-99)


 


Test


 5/26/20


19:04 5/26/20


20:03 5/26/20


21:06 5/26/20


22:00


 


Glucose (Fingerstick)


 338 mg/dL


(70-99) 315 mg/dL


(70-99) 278 mg/dL


(70-99) 229 mg/dL


(70-99)


 


Test


 5/26/20


23:00 5/27/20


00:00 5/27/20


00:50 5/27/20


02:03


 


Glucose (Fingerstick)


 190 mg/dL


(70-99) 182 mg/dL


(70-99) 174 mg/dL


(70-99) 142 mg/dL


(70-99)


 


Test


 5/27/20


03:05 5/27/20


04:00 5/27/20


06:52 5/27/20


11:12


 


Glucose (Fingerstick)


 137 mg/dL


(70-99) 


 273 mg/dL


(70-99) 257 mg/dL


(70-99)


 


White Blood Count


 


 4.5 x10^3/uL


(4.0-11.0) 


 





 


Red Blood Count


 


 4.87 x10^6/uL


(3.50-5.40) 


 





 


Hemoglobin


 


 13.8 g/dL


(12.0-15.5) 


 





 


Hematocrit


 


 40.4 %


(36.0-47.0) 


 





 


Mean Corpuscular Volume  83 fL ()   


 


Mean Corpuscular Hemoglobin  28 pg (25-35)   


 


Mean Corpuscular Hemoglobin


Concent 


 34 g/dL


(31-37) 


 





 


Red Cell Distribution Width


 


 16.2 %


(11.5-14.5) 


 





 


Platelet Count


 


 217 x10^3/uL


(140-400) 


 





 


Neutrophils (%) (Auto)  66 % (31-73)   


 


Lymphocytes (%) (Auto)  17 % (24-48)   


 


Monocytes (%) (Auto)  16 % (0-9)   


 


Eosinophils (%) (Auto)  0 % (0-3)   


 


Basophils (%) (Auto)  1 % (0-3)   


 


Neutrophils # (Auto)


 


 2.9 x10^3/uL


(1.8-7.7) 


 





 


Lymphocytes # (Auto)


 


 0.8 x10^3/uL


(1.0-4.8) 


 





 


Monocytes # (Auto)


 


 0.7 x10^3/uL


(0.0-1.1) 


 





 


Eosinophils # (Auto)


 


 0.0 x10^3/uL


(0.0-0.7) 


 





 


Basophils # (Auto)


 


 0.0 x10^3/uL


(0.0-0.2) 


 





 


Sodium Level


 


 139 mmol/L


(136-145) 


 





 


Potassium Level


 


 4.2 mmol/L


(3.5-5.1) 


 





 


Chloride Level


 


 106 mmol/L


() 


 





 


Carbon Dioxide Level


 


 25 mmol/L


(21-32) 


 





 


Anion Gap  8 (6-14)   


 


Blood Urea Nitrogen


 


 16 mg/dL


(7-20) 


 





 


Creatinine


 


 0.8 mg/dL


(0.6-1.0) 


 





 


Estimated GFR


(Cockcroft-Gault) 


 73.7 


 


 





 


BUN/Creatinine Ratio  20 (6-20)   


 


Glucose Level


 


 215 mg/dL


(70-99) 


 





 


Calcium Level


 


 7.8 mg/dL


(8.5-10.1) 


 





 


Total Bilirubin


 


 0.8 mg/dL


(0.2-1.0) 


 





 


Aspartate Amino Transf


(AST/SGOT) 


 22 U/L (15-37) 


 


 





 


Alanine Aminotransferase


(ALT/SGPT) 


 30 U/L (14-59) 


 


 





 


Alkaline Phosphatase


 


 78 U/L


() 


 





 


Total Protein


 


 6.4 g/dL


(6.4-8.2) 


 





 


Albumin


 


 2.9 g/dL


(3.4-5.0) 


 





 


Albumin/Globulin Ratio  0.8 (1.0-1.7)   


 


Lipase


 


 2384 U/L


() 


 





 


Test


 5/27/20


15:45 5/27/20


20:06 5/27/20


23:54 5/28/20


03:52


 


Glucose (Fingerstick)


 243 mg/dL


(70-99) 256 mg/dL


(70-99) 193 mg/dL


(70-99) 216 mg/dL


(70-99)


 


Test


 5/28/20


04:55 5/28/20


07:43 


 





 


White Blood Count


 1.8 x10^3/uL


(4.0-11.0) 


 


 





 


Red Blood Count


 4.49 x10^6/uL


(3.50-5.40) 


 


 





 


Hemoglobin


 12.6 g/dL


(12.0-15.5) 


 


 





 


Hematocrit


 38.1 %


(36.0-47.0) 


 


 





 


Mean Corpuscular Volume 85 fL ()    


 


Mean Corpuscular Hemoglobin 28 pg (25-35)    


 


Mean Corpuscular Hemoglobin


Concent 33 g/dL


(31-37) 


 


 





 


Red Cell Distribution Width


 16.7 %


(11.5-14.5) 


 


 





 


Platelet Count


 178 x10^3/uL


(140-400) 


 


 





 


Neutrophils (%) (Auto) 19 % (31-73)    


 


Lymphocytes (%) (Auto) 53 % (24-48)    


 


Monocytes (%) (Auto) 26 % (0-9)    


 


Eosinophils (%) (Auto) 2 % (0-3)    


 


Basophils (%) (Auto) 0 % (0-3)    


 


Neutrophils # (Auto)


 0.4 x10^3/uL


(1.8-7.7) 


 


 





 


Lymphocytes # (Auto)


 1.0 x10^3/uL


(1.0-4.8) 


 


 





 


Monocytes # (Auto)


 0.5 x10^3/uL


(0.0-1.1) 


 


 





 


Eosinophils # (Auto)


 0.0 x10^3/uL


(0.0-0.7) 


 


 





 


Basophils # (Auto)


 0.0 x10^3/uL


(0.0-0.2) 


 


 





 


Segmented Neutrophils % 25 % (35-66)    


 


Band Neutrophils % 7 % (0-9)    


 


Lymphocytes % 54 % (24-48)    


 


Monocytes % 13 % (0-10)    


 


Eosinophils % 1 % (0-5)    


 


Platelet Estimate


 Adequate


(ADEQUATE) 


 


 





 


Sodium Level


 146 mmol/L


(136-145) 


 


 





 


Potassium Level


 3.9 mmol/L


(3.5-5.1) 


 


 





 


Chloride Level


 110 mmol/L


() 


 


 





 


Carbon Dioxide Level


 24 mmol/L


(21-32) 


 


 





 


Anion Gap 12 (6-14)    


 


Blood Urea Nitrogen


 18 mg/dL


(7-20) 


 


 





 


Creatinine


 0.5 mg/dL


(0.6-1.0) 


 


 





 


Estimated GFR


(Cockcroft-Gault) 126.7 


 


 


 





 


BUN/Creatinine Ratio 36 (6-20)    


 


Glucose Level


 218 mg/dL


(70-99) 


 


 





 


Calcium Level


 8.1 mg/dL


(8.5-10.1) 


 


 





 


Total Bilirubin


 1.1 mg/dL


(0.2-1.0) 


 


 





 


Aspartate Amino Transf


(AST/SGOT) 19 U/L (15-37) 


 


 


 





 


Alanine Aminotransferase


(ALT/SGPT) 27 U/L (14-59) 


 


 


 





 


Alkaline Phosphatase


 70 U/L


() 


 


 





 


Total Protein


 5.8 g/dL


(6.4-8.2) 


 


 





 


Albumin


 2.8 g/dL


(3.4-5.0) 


 


 





 


Albumin/Globulin Ratio 0.9 (1.0-1.7)    


 


Amylase Level


 96 U/L


() 


 


 





 


Lipase


 1035 U/L


() 


 


 





 


Glucose (Fingerstick)


 


 176 mg/dL


(70-99) 


 











Laboratory Tests








Test


 5/27/20


11:12 5/27/20


15:45 5/27/20


20:06 5/27/20


23:54


 


Glucose (Fingerstick)


 257 mg/dL


(70-99) 243 mg/dL


(70-99) 256 mg/dL


(70-99) 193 mg/dL


(70-99)


 


Test


 5/28/20


03:52 5/28/20


04:55 5/28/20


07:43 





 


Glucose (Fingerstick)


 216 mg/dL


(70-99) 


 176 mg/dL


(70-99) 





 


White Blood Count


 


 1.8 x10^3/uL


(4.0-11.0) 


 





 


Red Blood Count


 


 4.49 x10^6/uL


(3.50-5.40) 


 





 


Hemoglobin


 


 12.6 g/dL


(12.0-15.5) 


 





 


Hematocrit


 


 38.1 %


(36.0-47.0) 


 





 


Mean Corpuscular Volume  85 fL ()   


 


Mean Corpuscular Hemoglobin  28 pg (25-35)   


 


Mean Corpuscular Hemoglobin


Concent 


 33 g/dL


(31-37) 


 





 


Red Cell Distribution Width


 


 16.7 %


(11.5-14.5) 


 





 


Platelet Count


 


 178 x10^3/uL


(140-400) 


 





 


Neutrophils (%) (Auto)  19 % (31-73)   


 


Lymphocytes (%) (Auto)  53 % (24-48)   


 


Monocytes (%) (Auto)  26 % (0-9)   


 


Eosinophils (%) (Auto)  2 % (0-3)   


 


Basophils (%) (Auto)  0 % (0-3)   


 


Neutrophils # (Auto)


 


 0.4 x10^3/uL


(1.8-7.7) 


 





 


Lymphocytes # (Auto)


 


 1.0 x10^3/uL


(1.0-4.8) 


 





 


Monocytes # (Auto)


 


 0.5 x10^3/uL


(0.0-1.1) 


 





 


Eosinophils # (Auto)


 


 0.0 x10^3/uL


(0.0-0.7) 


 





 


Basophils # (Auto)


 


 0.0 x10^3/uL


(0.0-0.2) 


 





 


Segmented Neutrophils %  25 % (35-66)   


 


Band Neutrophils %  7 % (0-9)   


 


Lymphocytes %  54 % (24-48)   


 


Monocytes %  13 % (0-10)   


 


Eosinophils %  1 % (0-5)   


 


Platelet Estimate


 


 Adequate


(ADEQUATE) 


 





 


Sodium Level


 


 146 mmol/L


(136-145) 


 





 


Potassium Level


 


 3.9 mmol/L


(3.5-5.1) 


 





 


Chloride Level


 


 110 mmol/L


() 


 





 


Carbon Dioxide Level


 


 24 mmol/L


(21-32) 


 





 


Anion Gap  12 (6-14)   


 


Blood Urea Nitrogen


 


 18 mg/dL


(7-20) 


 





 


Creatinine


 


 0.5 mg/dL


(0.6-1.0) 


 





 


Estimated GFR


(Cockcroft-Gault) 


 126.7 


 


 





 


BUN/Creatinine Ratio  36 (6-20)   


 


Glucose Level


 


 218 mg/dL


(70-99) 


 





 


Calcium Level


 


 8.1 mg/dL


(8.5-10.1) 


 





 


Total Bilirubin


 


 1.1 mg/dL


(0.2-1.0) 


 





 


Aspartate Amino Transf


(AST/SGOT) 


 19 U/L (15-37) 


 


 





 


Alanine Aminotransferase


(ALT/SGPT) 


 27 U/L (14-59) 


 


 





 


Alkaline Phosphatase


 


 70 U/L


() 


 





 


Total Protein


 


 5.8 g/dL


(6.4-8.2) 


 





 


Albumin


 


 2.8 g/dL


(3.4-5.0) 


 





 


Albumin/Globulin Ratio  0.9 (1.0-1.7)   


 


Amylase Level


 


 96 U/L


() 


 





 


Lipase


 


 1035 U/L


() 


 











Medications





Current Medications


Iohexol (Omnipaque 300 Mg/ml) 75 ml 1X  ONCE IV  Last administered on 5/26/20at 

11:30;  Start 5/26/20 at 11:30;  Stop 5/26/20 at 11:31;  Status DC


Iohexol (Omnipaque 240 Mg/ml) 50 ml 1X  ONCE PO  Last administered on 5/26/20at 

11:30;  Start 5/26/20 at 11:30;  Stop 5/26/20 at 11:31;  Status DC


Info (CONTRAST GIVEN -- Rx MONITORING) 1 each PRN DAILY  PRN MC SEE COMMENTS;  

Start 5/26/20 at 11:30;  Stop 5/28/20 at 11:29


Sodium Chloride 1,000 ml @  0 mls/hr 1X  ONCE IV  Last administered on 5/26/20at

12:27;  Start 5/26/20 at 12:15;  Stop 5/26/20 at 12:16;  Status DC


Morphine Sulfate (Morphine Sulfate) 5 mg 1X  ONCE IV  Last administered on 

5/26/20at 12:45;  Start 5/26/20 at 12:30;  Stop 5/26/20 at 12:33;  Status DC


Morphine Sulfate (Morphine Sulfate) 5 mg 1X  ONCE IV ;  Start 5/26/20 at 13:45; 

Stop 5/26/20 at 13:46;  Status DC


Morphine Sulfate (Morphine Sulfate) 4 mg PRN Q2HR  PRN IV MODERATE TO SEVERE 

PAIN Last administered on 5/27/20at 09:09;  Start 5/26/20 at 14:45


Sodium Chloride 1,000 ml @  100 mls/hr Q10H IV  Last administered on 5/27/20at 

12:17;  Start 5/26/20 at 14:44


Ondansetron HCl (Zofran) 4 mg PRN Q4HRS  PRN IV NAUSEA/VOMITING;  Start 5/26/20 

at 14:45


Acetaminophen (Tylenol Supp) 650 mg PRN Q4HRS  PRN FL TEMP OVER 100.4F OR MILD 

PAIN;  Start 5/26/20 at 14:45


Enoxaparin Sodium (Lovenox 40mg Syringe) 40 mg Q24H SQ  Last administered on 

5/27/20at 17:11;  Start 5/26/20 at 15:00


Insulin Human Lispro (HumaLOG) 0-9 UNITS Q4HRS SQ  Last administered on 

5/28/20at 04:09;  Start 5/26/20 at 16:00


Dextrose (Dextrose 50%-Water Syringe) 12.5 gm PRN Q15MIN  PRN IV SEE COMMENTS;  

Start 5/26/20 at 14:45


Albuterol Sulfate (Ventolin Neb Soln) 3 mg PRN QID  PRN NEB SHORTNESS OF BREATH;

 Start 5/26/20 at 15:15


Sodium Chloride (Saline Mist Nasal) 1 néstor PRN Q1HR  PRN NS NASAL CONGESTION;  

Start 5/26/20 at 15:15


Methylprednisolone Sodium Succinate (SOLU-Medrol 40MG VIAL) 40 mg DAILY IV  Last

administered on 5/27/20at 09:10;  Start 5/26/20 at 15:15


Insulin Human Regular 100 unit/ Sodium Chloride 101 ml @ 0 mls/hr CONT  PRN IV 

SEE I/O RECORD Last administered on 5/27/20at 02:12;  Start 5/26/20 at 18:00


Fentanyl Citrate (Fentanyl 2ml Vial) 50 mcg PRN Q2HR  PRN IVP PAIN Last 

administered on 5/28/20at 06:32;  Start 5/27/20 at 11:45





Active Scripts


Active


Reported


Zyrtec (Cetirizine Hcl) 10 Mg Capsule 10 Mg PO DAILY


Methotrexate (Methotrexate Sodium) 2.5 Mg Tablet 8 Tab PO WEEKLY


Protonix  ** (Pantoprazole Sodium) 40 Mg Tablet.dr 40 Mg PO DAILYAC


Folic Acid 0.8 Mg Capsule 1 Cap PO DAILY 30 Days


Ferrous Sulfate 325 Mg Tablet 1 Tab PO DAILY


Vitals/I & O





Vital Sign - Last 24 Hours








 5/27/20 5/27/20 5/27/20 5/27/20





 09:09 09:39 11:24 12:17


 


Temp   98.0 





   98.0 


 


Pulse   96 


 


Resp   16 


 


B/P (MAP)   116/66 (83) 


 


Pulse Ox   91 


 


O2 Delivery Room Air Room Air Room Air Room Air


 


    





    





 5/27/20 5/27/20 5/27/20 5/27/20





 12:47 14:29 14:50 14:59


 


Temp   98.3 





   98.3 


 


Pulse   97 


 


Resp   18 


 


B/P (MAP)   135/72 (93) 


 


Pulse Ox 95  92 94


 


O2 Delivery Nasal Cannula Nasal Cannula Nasal Cannula Nasal Cannula


 


O2 Flow Rate 2.0 2.0 2.0 2.0


 


    





    





 5/27/20 5/27/20 5/27/20 5/27/20





 18:12 18:53 19:44 20:00


 


Temp  98.3 98.3 





  98.3 98.3 


 


Pulse  94 85 


 


Resp  16 18 


 


B/P (MAP)  130/77 (94) 124/74 (91) 


 


Pulse Ox  94 96 


 


O2 Delivery Room Air Nasal Cannula Nasal Cannula Nasal Cannula


 


O2 Flow Rate  2.0 2.0 2.0


 


    





    





 5/27/20 5/27/20 5/27/20 5/27/20





 20:22 20:52 22:41 23:14


 


Temp   98.2 





   98.2 


 


Pulse   96 


 


Resp 20 20 18 20


 


B/P (MAP)   131/72 (91) 


 


Pulse Ox   95 95


 


O2 Delivery Room Air Room Air Nasal Cannula Nasal Cannula


 


O2 Flow Rate   2.0 2.0


 


    





    





 5/27/20 5/28/20 5/28/20 5/28/20





 23:44 02:41 02:53 03:11


 


Temp   98.6 





   98.6 


 


Pulse   95 


 


Resp 20 20 18 20


 


B/P (MAP)   128/65 (86) 


 


Pulse Ox   92 


 


O2 Delivery Nasal Cannula Nasal Cannula Nasal Cannula Nasal Cannula


 


O2 Flow Rate 2.0 2.0 2.0 2.0


 


    





    





 5/28/20 5/28/20  





 06:32 07:02  


 


Resp 20 20  


 


O2 Delivery Room Air Room Air  














Intake and Output   


 


 5/27/20 5/27/20 5/28/20





 15:00 23:00 07:00


 


Intake Total  0 ml 2400 ml


 


Balance  0 ml 2400 ml

















TITO ASHBY MD        May 28, 2020 08:24

## 2020-05-29 VITALS — SYSTOLIC BLOOD PRESSURE: 135 MMHG | DIASTOLIC BLOOD PRESSURE: 67 MMHG

## 2020-05-29 VITALS — SYSTOLIC BLOOD PRESSURE: 131 MMHG | DIASTOLIC BLOOD PRESSURE: 81 MMHG

## 2020-05-29 VITALS — SYSTOLIC BLOOD PRESSURE: 130 MMHG | DIASTOLIC BLOOD PRESSURE: 60 MMHG

## 2020-05-29 VITALS — DIASTOLIC BLOOD PRESSURE: 77 MMHG | SYSTOLIC BLOOD PRESSURE: 137 MMHG

## 2020-05-29 VITALS — DIASTOLIC BLOOD PRESSURE: 67 MMHG | SYSTOLIC BLOOD PRESSURE: 140 MMHG

## 2020-05-29 VITALS — DIASTOLIC BLOOD PRESSURE: 72 MMHG | SYSTOLIC BLOOD PRESSURE: 140 MMHG

## 2020-05-29 LAB
ALBUMIN SERPL-MCNC: 2.4 G/DL (ref 3.4–5)
ALBUMIN/GLOB SERPL: 0.7 {RATIO} (ref 1–1.7)
ALP SERPL-CCNC: 64 U/L (ref 46–116)
ALT SERPL-CCNC: 18 U/L (ref 14–59)
ANION GAP SERPL CALC-SCNC: 12 MMOL/L (ref 6–14)
AST SERPL-CCNC: 15 U/L (ref 15–37)
BASOPHILS # BLD AUTO: 0 X10^3/UL (ref 0–0.2)
BASOPHILS NFR BLD: 0 % (ref 0–3)
BILIRUB SERPL-MCNC: 1 MG/DL (ref 0.2–1)
BUN SERPL-MCNC: 14 MG/DL (ref 7–20)
BUN/CREAT SERPL: 23 (ref 6–20)
CALCIUM SERPL-MCNC: 8.4 MG/DL (ref 8.5–10.1)
CHLORIDE SERPL-SCNC: 107 MMOL/L (ref 98–107)
CO2 SERPL-SCNC: 22 MMOL/L (ref 21–32)
CREAT SERPL-MCNC: 0.6 MG/DL (ref 0.6–1)
EOSINOPHIL NFR BLD: 0 X10^3/UL (ref 0–0.7)
EOSINOPHIL NFR BLD: 1 % (ref 0–3)
ERYTHROCYTE [DISTWIDTH] IN BLOOD BY AUTOMATED COUNT: 16.6 % (ref 11.5–14.5)
GFR SERPLBLD BASED ON 1.73 SQ M-ARVRAT: 102.7 ML/MIN
GLOBULIN SER-MCNC: 3.5 G/DL (ref 2.2–3.8)
GLUCOSE SERPL-MCNC: 192 MG/DL (ref 70–99)
HCT VFR BLD CALC: 35.5 % (ref 36–47)
HGB BLD-MCNC: 11.8 G/DL (ref 12–15.5)
IGG SERPL-MCNC: 850 MG/DL (ref 586–1602)
IGG1 SER-MCNC: 353 MG/DL (ref 248–810)
IGG2 SER-MCNC: 290 MG/DL (ref 130–555)
IGG3 SER-MCNC: 90 MG/DL (ref 15–102)
IGG4 SER-MCNC: 71 MG/DL (ref 2–96)
LIPASE: 521 U/L (ref 73–393)
LYMPHOCYTES # BLD: 1 X10^3/UL (ref 1–4.8)
LYMPHOCYTES NFR BLD AUTO: 55 % (ref 24–48)
MCH RBC QN AUTO: 28 PG (ref 25–35)
MCHC RBC AUTO-ENTMCNC: 33 G/DL (ref 31–37)
MCV RBC AUTO: 85 FL (ref 79–100)
MONO #: 0.5 X10^3/UL (ref 0–1.1)
MONOCYTES NFR BLD: 28 % (ref 0–9)
NEUT #: 0.3 X10^3/UL (ref 1.8–7.7)
NEUTROPHILS NFR BLD AUTO: 16 % (ref 31–73)
PLATELET # BLD AUTO: 171 X10^3/UL (ref 140–400)
POTASSIUM SERPL-SCNC: 3.5 MMOL/L (ref 3.5–5.1)
PROT SERPL-MCNC: 5.9 G/DL (ref 6.4–8.2)
RBC # BLD AUTO: 4.19 X10^6/UL (ref 3.5–5.4)
SODIUM SERPL-SCNC: 141 MMOL/L (ref 136–145)
WBC # BLD AUTO: 1.8 X10^3/UL (ref 4–11)

## 2020-05-29 RX ADMIN — FENTANYL CITRATE PRN MCG: 50 INJECTION INTRAMUSCULAR; INTRAVENOUS at 19:42

## 2020-05-29 RX ADMIN — FENTANYL CITRATE PRN MCG: 50 INJECTION INTRAMUSCULAR; INTRAVENOUS at 23:56

## 2020-05-29 RX ADMIN — FENTANYL CITRATE PRN MCG: 50 INJECTION INTRAMUSCULAR; INTRAVENOUS at 17:08

## 2020-05-29 RX ADMIN — ENOXAPARIN SODIUM SCH MG: 40 INJECTION SUBCUTANEOUS at 14:57

## 2020-05-29 RX ADMIN — FENTANYL CITRATE PRN MCG: 50 INJECTION INTRAMUSCULAR; INTRAVENOUS at 09:20

## 2020-05-29 RX ADMIN — INSULIN LISPRO SCH UNITS: 100 INJECTION, SOLUTION INTRAVENOUS; SUBCUTANEOUS at 20:00

## 2020-05-29 RX ADMIN — BACITRACIN SCH MLS/HR: 5000 INJECTION, POWDER, FOR SOLUTION INTRAMUSCULAR at 14:58

## 2020-05-29 RX ADMIN — FENTANYL CITRATE PRN MCG: 50 INJECTION INTRAMUSCULAR; INTRAVENOUS at 12:13

## 2020-05-29 RX ADMIN — INSULIN LISPRO SCH UNITS: 100 INJECTION, SOLUTION INTRAVENOUS; SUBCUTANEOUS at 04:00

## 2020-05-29 RX ADMIN — FENTANYL CITRATE PRN MCG: 50 INJECTION INTRAMUSCULAR; INTRAVENOUS at 04:41

## 2020-05-29 RX ADMIN — INSULIN LISPRO SCH UNITS: 100 INJECTION, SOLUTION INTRAVENOUS; SUBCUTANEOUS at 12:21

## 2020-05-29 RX ADMIN — FENTANYL CITRATE PRN MCG: 50 INJECTION INTRAMUSCULAR; INTRAVENOUS at 06:15

## 2020-05-29 RX ADMIN — FAMOTIDINE SCH MG: 10 INJECTION, SOLUTION INTRAVENOUS at 22:00

## 2020-05-29 RX ADMIN — INSULIN LISPRO SCH UNITS: 100 INJECTION, SOLUTION INTRAVENOUS; SUBCUTANEOUS at 00:00

## 2020-05-29 RX ADMIN — INSULIN LISPRO SCH UNITS: 100 INJECTION, SOLUTION INTRAVENOUS; SUBCUTANEOUS at 17:13

## 2020-05-29 RX ADMIN — FENTANYL CITRATE PRN MCG: 50 INJECTION INTRAMUSCULAR; INTRAVENOUS at 00:45

## 2020-05-29 RX ADMIN — INSULIN LISPRO SCH UNITS: 100 INJECTION, SOLUTION INTRAVENOUS; SUBCUTANEOUS at 08:00

## 2020-05-29 RX ADMIN — FENTANYL CITRATE PRN MCG: 50 INJECTION INTRAMUSCULAR; INTRAVENOUS at 22:00

## 2020-05-29 RX ADMIN — FENTANYL CITRATE PRN MCG: 50 INJECTION INTRAMUSCULAR; INTRAVENOUS at 14:29

## 2020-05-29 NOTE — PDOC
Subjective:


Subjective:


Still sore - epigastric/LUQ pain after ice chips.





Objective:


Objective:


D/w nurse - requests pain meds 'round the clock.


Vital Signs:





                                   Vital Signs








  Date Time  Temp Pulse Resp B/P (MAP) Pulse Ox O2 Delivery O2 Flow Rate FiO2


 


5/29/20 09:20      Nasal Cannula 2.0 


 


5/29/20 07:00 98.0 88 18 137/77 (97) 100   





 98.0       








Labs:





Laboratory Tests








Test


 5/28/20


11:59 5/28/20


16:53 5/28/20


21:35 5/29/20


00:48


 


Glucose (Fingerstick)


 217 mg/dL


(70-99) 207 mg/dL


(70-99) 209 mg/dL


(70-99) 169 mg/dL


(70-99)


 


Test


 5/29/20


04:46 5/29/20


08:02 


 





 


Glucose (Fingerstick)


 163 mg/dL


(70-99) 163 mg/dL


(70-99) 


 














PE:





GEN: NAD - does not appear uncomfortable


LUNGS: CTAB anteriorly


HEART: RRR


ABD: NABS, S/ND, epigastric/LUQ discomfort


NEURO/PSYCH: A & O 3





A/P:


Pancreatitis, hypertriglyceridemia - IgG4 WNL, s/p cholecystectomy, no alcohol 

history


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


Leukopenia, DM, h/o celiac





--


We talked about continuing NPO w/ ongoing abd pain - she says she might like to 

try some warm broth later, so will cautiously try clear liquids - d/w nurse.





Hemodynamically unstable?:  No


Is patient in severe pain?:  No


Is NPO status required?:  No











AILYN OWEN         May 29, 2020 09:45

## 2020-05-29 NOTE — PDOC
PROGRESS NOTES


Chief Complaint


Chief Complaint


Acute pancreatitis - likely 2/2 hyper-triglyceridemia.  IgG4 RD less likely 

given level of 72 mg/DL and chronic immunosuppression on methotrexate and 

prednisone.  I discussed with [facility] rheumatology concern that MTX could play a 

role as well.  Hold dosing for leukopenia


Duodenal and jejunal intraluminal lipomas


Hypertriglyceridemia - will need outpatient Endocrinology referral for 

cholesterol metabolism treatment, I have suggested prescription 2g TID fish oil 

(Vascepa or Lovaza) in the meantime once pancreatitis resolves


Hyponatremia


DM2 with Hyperglycemia - A1c 13.6. Should continue on insulin therapy until A1c 

< 8, then consider PO options. Would probably have to avoid GLP1 therapy given 

her pancreatitis.  Would defer to an endocrinologist for this.


Celiac disease 


Adult onset stills disease


Anemia


Asthma


NATO on CPAP


GERD 


Leukopenia -likely related to chronic immunosuppression will hold MTX therapy 

for now and change to a prednisone taper on discharge.





History of Present Illness


History of Present Illness


Ms Almanza is a 60yo F [employer]  w/ PMHx celiac disease, adult onset stills 

disease, Anemia, Asthma, NATO on CPAP, GERD who presents with severe upper 

abdominal pain, nausea, vomiting, fatigue. She states this started suddenly this

morning while she was working from home on teleconference (works as a 

on the  base). She only had oatmeal for breakfast.


She has never had anything similar to this in the past.  She generally feels 

unwell.  She is not had any fever, diarrhea, chest pain, shortness of breath, 

dysuria, hematuria, blood in her stools.  She denies alcohol abuse.  Pain does 

not move anywhere.  Nothing seems to make it better or worse.  She has not tried

anything at home.  He does not drink alcohol, and is status post 

cholecystectomy.  No calcium disorders.


She takes 20mg methotrexate weekly on Fridays and is on 3 mg of chronic 

prednisone for her stills disease.  She was diagnosed with celiac disease 

January 2020 and has just recently started on a gluten-free diet.  No recent 

sick contacts that she can recall.


Labs significant for lipase 03223, glucose 477, , bilirubin 1.1, Calcium 

8.3, K 3.7, WBC 6.3, Hb 15, Platelets 247


CT abdomen pelvis shows surgically absent gallbladder and surgically absent 

uterus and describes duodenal and jejunal intraluminal lipomas. Findings of 

acute pancreatitis. No loculated collections. No biliary dilatation.


Admitted for further treatment.





5/27: Lipase down. A1c 13.6, Triglycerides 3252. Started on insulin gtt.


5/28: Abdominal pain and glucose improved.  Having ice chips per GI.  Afebrile. 

No CP or SOB. D/w [facility] rheumatology, to hold MTX therapy on 5/29 given her 

leukopenia and pancreatitis.





Overnight afebrile.  Labs improved. Still with left-sided abdominal pain.  WBC 

1.8.  She notes she had not previously had a formal diagnosis of diabetes.  No 

CP or S OB





Vitals


Vitals





Vital Signs








  Date Time  Temp Pulse Resp B/P (MAP) Pulse Ox O2 Delivery O2 Flow Rate FiO2


 


5/29/20 07:00 98.0 88 18 137/77 (97) 100 Room Air  





 98.0       


 


5/29/20 06:45       2.0 











Physical Exam


General:  Alert, Oriented X3, Cooperative, moderate distress


Abdomen:  Normal bowel sounds, Soft, No hepatosplenomegaly, No masses, Other 

(Diffuse tenderness)


Extremities:  No clubbing, No cyanosis, No edema, Normal pulses, No 

tenderness/swelling


Skin:  No rashes, No breakdown, No significant lesion





Labs


LABS





Laboratory Tests








Test


 5/28/20


11:59 5/28/20


16:53 5/28/20


21:35 5/29/20


00:48


 


Glucose (Fingerstick)


 217 mg/dL


(70-99) 207 mg/dL


(70-99) 209 mg/dL


(70-99) 169 mg/dL


(70-99)


 


Test


 5/29/20


03:30 5/29/20


03:45 5/29/20


04:46 5/29/20


08:02


 


Sodium Level


 141 mmol/L


(136-145) 


 


 





 


Potassium Level


 3.5 mmol/L


(3.5-5.1) 


 


 





 


Chloride Level


 107 mmol/L


() 


 


 





 


Carbon Dioxide Level


 22 mmol/L


(21-32) 


 


 





 


Anion Gap 12 (6-14)    


 


Blood Urea Nitrogen


 14 mg/dL


(7-20) 


 


 





 


Creatinine


 0.6 mg/dL


(0.6-1.0) 


 


 





 


Estimated GFR


(Cockcroft-Gault) 102.7 


 


 


 





 


BUN/Creatinine Ratio 23 (6-20)    


 


Glucose Level


 192 mg/dL


(70-99) 


 


 





 


Calcium Level


 8.4 mg/dL


(8.5-10.1) 


 


 





 


Total Bilirubin


 1.0 mg/dL


(0.2-1.0) 


 


 





 


Aspartate Amino Transf


(AST/SGOT) 15 U/L (15-37) 


 


 


 





 


Alanine Aminotransferase


(ALT/SGPT) 18 U/L (14-59) 


 


 


 





 


Alkaline Phosphatase


 64 U/L


() 


 


 





 


Total Protein


 5.9 g/dL


(6.4-8.2) 


 


 





 


Albumin


 2.4 g/dL


(3.4-5.0) 


 


 





 


Albumin/Globulin Ratio 0.7 (1.0-1.7)    


 


Lipase


 521 U/L


() 


 


 





 


White Blood Count


 


 1.8 x10^3/uL


(4.0-11.0) 


 





 


Red Blood Count


 


 4.19 x10^6/uL


(3.50-5.40) 


 





 


Hemoglobin


 


 11.8 g/dL


(12.0-15.5) 


 





 


Hematocrit


 


 35.5 %


(36.0-47.0) 


 





 


Mean Corpuscular Volume  85 fL ()   


 


Mean Corpuscular Hemoglobin  28 pg (25-35)   


 


Mean Corpuscular Hemoglobin


Concent 


 33 g/dL


(31-37) 


 





 


Red Cell Distribution Width


 


 16.6 %


(11.5-14.5) 


 





 


Platelet Count


 


 171 x10^3/uL


(140-400) 


 





 


Neutrophils (%) (Auto)  16 % (31-73)   


 


Lymphocytes (%) (Auto)  55 % (24-48)   


 


Monocytes (%) (Auto)  28 % (0-9)   


 


Eosinophils (%) (Auto)  1 % (0-3)   


 


Basophils (%) (Auto)  0 % (0-3)   


 


Neutrophils # (Auto)


 


 0.3 x10^3/uL


(1.8-7.7) 


 





 


Lymphocytes # (Auto)


 


 1.0 x10^3/uL


(1.0-4.8) 


 





 


Monocytes # (Auto)


 


 0.5 x10^3/uL


(0.0-1.1) 


 





 


Eosinophils # (Auto)


 


 0.0 x10^3/uL


(0.0-0.7) 


 





 


Basophils # (Auto)


 


 0.0 x10^3/uL


(0.0-0.2) 


 





 


Glucose (Fingerstick)


 


 


 163 mg/dL


(70-99) 163 mg/dL


(70-99)











Assessment and Plan


Assessmemt and Plan


Problems


Medical Problems:


(1) Dehydration


Status: Acute  





(2) Hyperglycemia


Status: Acute  





(3) Pancreatitis, acute


Status: Acute  











Comment


Review of Relevant


I have reviewed the following items elizabeth (where applicable) has been applied.


Labs





Laboratory Tests








Test


 5/27/20


10:00 5/27/20


11:12 5/27/20


15:45 5/27/20


20:06


 


Immunoglobulin G Total


 850 mg/dL


(586-1602) 


 


 





 


Immunoglobulin G1


 353 mg/dL


(248-810) 


 


 





 


Immunoglobulin G2


 290 mg/dL


(130-555) 


 


 





 


Immunoglobulin G3


 90 mg/dL


() 


 


 





 


Immunoglobulin G4


 71 mg/dL


(2-96) 


 


 





 


Glucose (Fingerstick)


 


 257 mg/dL


(70-99) 243 mg/dL


(70-99) 256 mg/dL


(70-99)


 


Test


 5/27/20


23:54 5/28/20


03:52 5/28/20


04:55 5/28/20


07:43


 


Glucose (Fingerstick)


 193 mg/dL


(70-99) 216 mg/dL


(70-99) 


 176 mg/dL


(70-99)


 


White Blood Count


 


 


 1.8 x10^3/uL


(4.0-11.0) 





 


Red Blood Count


 


 


 4.49 x10^6/uL


(3.50-5.40) 





 


Hemoglobin


 


 


 12.6 g/dL


(12.0-15.5) 





 


Hematocrit


 


 


 38.1 %


(36.0-47.0) 





 


Mean Corpuscular Volume   85 fL ()  


 


Mean Corpuscular Hemoglobin   28 pg (25-35)  


 


Mean Corpuscular Hemoglobin


Concent 


 


 33 g/dL


(31-37) 





 


Red Cell Distribution Width


 


 


 16.7 %


(11.5-14.5) 





 


Platelet Count


 


 


 178 x10^3/uL


(140-400) 





 


Neutrophils (%) (Auto)   19 % (31-73)  


 


Lymphocytes (%) (Auto)   53 % (24-48)  


 


Monocytes (%) (Auto)   26 % (0-9)  


 


Eosinophils (%) (Auto)   2 % (0-3)  


 


Basophils (%) (Auto)   0 % (0-3)  


 


Neutrophils # (Auto)


 


 


 0.4 x10^3/uL


(1.8-7.7) 





 


Lymphocytes # (Auto)


 


 


 1.0 x10^3/uL


(1.0-4.8) 





 


Monocytes # (Auto)


 


 


 0.5 x10^3/uL


(0.0-1.1) 





 


Eosinophils # (Auto)


 


 


 0.0 x10^3/uL


(0.0-0.7) 





 


Basophils # (Auto)


 


 


 0.0 x10^3/uL


(0.0-0.2) 





 


Segmented Neutrophils %   25 % (35-66)  


 


Band Neutrophils %   7 % (0-9)  


 


Lymphocytes %   54 % (24-48)  


 


Monocytes %   13 % (0-10)  


 


Eosinophils %   1 % (0-5)  


 


Platelet Estimate


 


 


 Adequate


(ADEQUATE) 





 


Sodium Level


 


 


 146 mmol/L


(136-145) 





 


Potassium Level


 


 


 3.9 mmol/L


(3.5-5.1) 





 


Chloride Level


 


 


 110 mmol/L


() 





 


Carbon Dioxide Level


 


 


 24 mmol/L


(21-32) 





 


Anion Gap   12 (6-14)  


 


Blood Urea Nitrogen


 


 


 18 mg/dL


(7-20) 





 


Creatinine


 


 


 0.5 mg/dL


(0.6-1.0) 





 


Estimated GFR


(Cockcroft-Gault) 


 


 126.7 


 





 


BUN/Creatinine Ratio   36 (6-20)  


 


Glucose Level


 


 


 218 mg/dL


(70-99) 





 


Calcium Level


 


 


 8.1 mg/dL


(8.5-10.1) 





 


Total Bilirubin


 


 


 1.1 mg/dL


(0.2-1.0) 





 


Aspartate Amino Transf


(AST/SGOT) 


 


 19 U/L (15-37) 


 





 


Alanine Aminotransferase


(ALT/SGPT) 


 


 27 U/L (14-59) 


 





 


Alkaline Phosphatase


 


 


 70 U/L


() 





 


Total Protein


 


 


 5.8 g/dL


(6.4-8.2) 





 


Albumin


 


 


 2.8 g/dL


(3.4-5.0) 





 


Albumin/Globulin Ratio   0.9 (1.0-1.7)  


 


Amylase Level


 


 


 96 U/L


() 





 


Lipase


 


 


 1035 U/L


() 





 


Test


 5/28/20


11:59 5/28/20


16:53 5/28/20


21:35 5/29/20


00:48


 


Glucose (Fingerstick)


 217 mg/dL


(70-99) 207 mg/dL


(70-99) 209 mg/dL


(70-99) 169 mg/dL


(70-99)


 


Test


 5/29/20


03:30 5/29/20


03:45 5/29/20


04:46 5/29/20


08:02


 


Sodium Level


 141 mmol/L


(136-145) 


 


 





 


Potassium Level


 3.5 mmol/L


(3.5-5.1) 


 


 





 


Chloride Level


 107 mmol/L


() 


 


 





 


Carbon Dioxide Level


 22 mmol/L


(21-32) 


 


 





 


Anion Gap 12 (6-14)    


 


Blood Urea Nitrogen


 14 mg/dL


(7-20) 


 


 





 


Creatinine


 0.6 mg/dL


(0.6-1.0) 


 


 





 


Estimated GFR


(Cockcroft-Gault) 102.7 


 


 


 





 


BUN/Creatinine Ratio 23 (6-20)    


 


Glucose Level


 192 mg/dL


(70-99) 


 


 





 


Calcium Level


 8.4 mg/dL


(8.5-10.1) 


 


 





 


Total Bilirubin


 1.0 mg/dL


(0.2-1.0) 


 


 





 


Aspartate Amino Transf


(AST/SGOT) 15 U/L (15-37) 


 


 


 





 


Alanine Aminotransferase


(ALT/SGPT) 18 U/L (14-59) 


 


 


 





 


Alkaline Phosphatase


 64 U/L


() 


 


 





 


Total Protein


 5.9 g/dL


(6.4-8.2) 


 


 





 


Albumin


 2.4 g/dL


(3.4-5.0) 


 


 





 


Albumin/Globulin Ratio 0.7 (1.0-1.7)    


 


Lipase


 521 U/L


() 


 


 





 


White Blood Count


 


 1.8 x10^3/uL


(4.0-11.0) 


 





 


Red Blood Count


 


 4.19 x10^6/uL


(3.50-5.40) 


 





 


Hemoglobin


 


 11.8 g/dL


(12.0-15.5) 


 





 


Hematocrit


 


 35.5 %


(36.0-47.0) 


 





 


Mean Corpuscular Volume  85 fL ()   


 


Mean Corpuscular Hemoglobin  28 pg (25-35)   


 


Mean Corpuscular Hemoglobin


Concent 


 33 g/dL


(31-37) 


 





 


Red Cell Distribution Width


 


 16.6 %


(11.5-14.5) 


 





 


Platelet Count


 


 171 x10^3/uL


(140-400) 


 





 


Neutrophils (%) (Auto)  16 % (31-73)   


 


Lymphocytes (%) (Auto)  55 % (24-48)   


 


Monocytes (%) (Auto)  28 % (0-9)   


 


Eosinophils (%) (Auto)  1 % (0-3)   


 


Basophils (%) (Auto)  0 % (0-3)   


 


Neutrophils # (Auto)


 


 0.3 x10^3/uL


(1.8-7.7) 


 





 


Lymphocytes # (Auto)


 


 1.0 x10^3/uL


(1.0-4.8) 


 





 


Monocytes # (Auto)


 


 0.5 x10^3/uL


(0.0-1.1) 


 





 


Eosinophils # (Auto)


 


 0.0 x10^3/uL


(0.0-0.7) 


 





 


Basophils # (Auto)


 


 0.0 x10^3/uL


(0.0-0.2) 


 





 


Glucose (Fingerstick)


 


 


 163 mg/dL


(70-99) 163 mg/dL


(70-99)








Laboratory Tests








Test


 5/28/20


11:59 5/28/20


16:53 5/28/20


21:35 5/29/20


00:48


 


Glucose (Fingerstick)


 217 mg/dL


(70-99) 207 mg/dL


(70-99) 209 mg/dL


(70-99) 169 mg/dL


(70-99)


 


Test


 5/29/20


03:30 5/29/20


03:45 5/29/20


04:46 5/29/20


08:02


 


Sodium Level


 141 mmol/L


(136-145) 


 


 





 


Potassium Level


 3.5 mmol/L


(3.5-5.1) 


 


 





 


Chloride Level


 107 mmol/L


() 


 


 





 


Carbon Dioxide Level


 22 mmol/L


(21-32) 


 


 





 


Anion Gap 12 (6-14)    


 


Blood Urea Nitrogen


 14 mg/dL


(7-20) 


 


 





 


Creatinine


 0.6 mg/dL


(0.6-1.0) 


 


 





 


Estimated GFR


(Cockcroft-Gault) 102.7 


 


 


 





 


BUN/Creatinine Ratio 23 (6-20)    


 


Glucose Level


 192 mg/dL


(70-99) 


 


 





 


Calcium Level


 8.4 mg/dL


(8.5-10.1) 


 


 





 


Total Bilirubin


 1.0 mg/dL


(0.2-1.0) 


 


 





 


Aspartate Amino Transf


(AST/SGOT) 15 U/L (15-37) 


 


 


 





 


Alanine Aminotransferase


(ALT/SGPT) 18 U/L (14-59) 


 


 


 





 


Alkaline Phosphatase


 64 U/L


() 


 


 





 


Total Protein


 5.9 g/dL


(6.4-8.2) 


 


 





 


Albumin


 2.4 g/dL


(3.4-5.0) 


 


 





 


Albumin/Globulin Ratio 0.7 (1.0-1.7)    


 


Lipase


 521 U/L


() 


 


 





 


White Blood Count


 


 1.8 x10^3/uL


(4.0-11.0) 


 





 


Red Blood Count


 


 4.19 x10^6/uL


(3.50-5.40) 


 





 


Hemoglobin


 


 11.8 g/dL


(12.0-15.5) 


 





 


Hematocrit


 


 35.5 %


(36.0-47.0) 


 





 


Mean Corpuscular Volume  85 fL ()   


 


Mean Corpuscular Hemoglobin  28 pg (25-35)   


 


Mean Corpuscular Hemoglobin


Concent 


 33 g/dL


(31-37) 


 





 


Red Cell Distribution Width


 


 16.6 %


(11.5-14.5) 


 





 


Platelet Count


 


 171 x10^3/uL


(140-400) 


 





 


Neutrophils (%) (Auto)  16 % (31-73)   


 


Lymphocytes (%) (Auto)  55 % (24-48)   


 


Monocytes (%) (Auto)  28 % (0-9)   


 


Eosinophils (%) (Auto)  1 % (0-3)   


 


Basophils (%) (Auto)  0 % (0-3)   


 


Neutrophils # (Auto)


 


 0.3 x10^3/uL


(1.8-7.7) 


 





 


Lymphocytes # (Auto)


 


 1.0 x10^3/uL


(1.0-4.8) 


 





 


Monocytes # (Auto)


 


 0.5 x10^3/uL


(0.0-1.1) 


 





 


Eosinophils # (Auto)


 


 0.0 x10^3/uL


(0.0-0.7) 


 





 


Basophils # (Auto)


 


 0.0 x10^3/uL


(0.0-0.2) 


 





 


Glucose (Fingerstick)


 


 


 163 mg/dL


(70-99) 163 mg/dL


(70-99)








Medications





Current Medications


Iohexol (Omnipaque 300 Mg/ml) 75 ml 1X  ONCE IV  Last administered on 5/26/20at 

11:30;  Start 5/26/20 at 11:30;  Stop 5/26/20 at 11:31;  Status DC


Iohexol (Omnipaque 240 Mg/ml) 50 ml 1X  ONCE PO  Last administered on 5/26/20at 

11:30;  Start 5/26/20 at 11:30;  Stop 5/26/20 at 11:31;  Status DC


Info (CONTRAST GIVEN -- Rx MONITORING) 1 each PRN DAILY  PRN MC SEE COMMENTS;  

Start 5/26/20 at 11:30;  Stop 5/28/20 at 11:29;  Status DC


Sodium Chloride 1,000 ml @  0 mls/hr 1X  ONCE IV  Last administered on 5/26/20at

12:27;  Start 5/26/20 at 12:15;  Stop 5/26/20 at 12:16;  Status DC


Morphine Sulfate (Morphine Sulfate) 5 mg 1X  ONCE IV  Last administered on 

5/26/20at 12:45;  Start 5/26/20 at 12:30;  Stop 5/26/20 at 12:33;  Status DC


Morphine Sulfate (Morphine Sulfate) 5 mg 1X  ONCE IV ;  Start 5/26/20 at 13:45; 

Stop 5/26/20 at 13:46;  Status DC


Morphine Sulfate (Morphine Sulfate) 4 mg PRN Q2HR  PRN IV MODERATE TO SEVERE 

PAIN Last administered on 5/28/20at 16:18;  Start 5/26/20 at 14:45


Sodium Chloride 1,000 ml @  100 mls/hr Q10H IV  Last administered on 5/28/20at 

19:44;  Start 5/26/20 at 14:44


Ondansetron HCl (Zofran) 4 mg PRN Q4HRS  PRN IV NAUSEA/VOMITING;  Start 5/26/20 

at 14:45


Acetaminophen (Tylenol Supp) 650 mg PRN Q4HRS  PRN OK TEMP OVER 100.4F OR MILD 

PAIN;  Start 5/26/20 at 14:45


Enoxaparin Sodium (Lovenox 40mg Syringe) 40 mg Q24H SQ  Last administered on 

5/28/20at 15:27;  Start 5/26/20 at 15:00


Insulin Human Lispro (HumaLOG) 0-9 UNITS Q4HRS SQ  Last administered on 

5/28/20at 17:00;  Start 5/26/20 at 16:00


Dextrose (Dextrose 50%-Water Syringe) 12.5 gm PRN Q15MIN  PRN IV SEE COMMENTS;  

Start 5/26/20 at 14:45


Albuterol Sulfate (Ventolin Neb Soln) 3 mg PRN QID  PRN NEB SHORTNESS OF BREATH;

 Start 5/26/20 at 15:15


Sodium Chloride (Saline Mist Nasal) 1 néstor PRN Q1HR  PRN NS NASAL CONGESTION;  

Start 5/26/20 at 15:15


Methylprednisolone Sodium Succinate (SOLU-Medrol 40MG VIAL) 40 mg DAILY IV  Last

administered on 5/28/20at 08:21;  Start 5/26/20 at 15:15;  Stop 5/28/20 at 

13:44;  Status DC


Insulin Human Regular 100 unit/ Sodium Chloride 101 ml @ 0 mls/hr CONT  PRN IV 

SEE I/O RECORD Last administered on 5/27/20at 02:12;  Start 5/26/20 at 18:00


Fentanyl Citrate (Fentanyl 2ml Vial) 50 mcg PRN Q2HR  PRN IVP PAIN Last 

administered on 5/29/20at 06:15;  Start 5/27/20 at 11:45


Famotidine (Pepcid Vial) 20 mg QHS IVP  Last administered on 5/28/20at 19:45;  

Start 5/28/20 at 21:00


Prednisone (Prednisone) 3 mg DAILY PO ;  Start 5/29/20 at 09:00





Active Scripts


Active


Reported


Zyrtec (Cetirizine Hcl) 10 Mg Capsule 10 Mg PO DAILY


Methotrexate (Methotrexate Sodium) 2.5 Mg Tablet 8 Tab PO WEEKLY


Protonix  ** (Pantoprazole Sodium) 40 Mg Tablet.dr 40 Mg PO DAILYAC


Folic Acid 0.8 Mg Capsule 1 Cap PO DAILY 30 Days


Ferrous Sulfate 325 Mg Tablet 1 Tab PO DAILY


Vitals/I & O





Vital Sign - Last 24 Hours








 5/28/20 5/28/20 5/28/20 5/28/20





 09:38 11:00 15:00 17:02


 


Temp  98.0 97.6 





  98.0 97.6 


 


Pulse  96 97 


 


Resp  18 20 


 


B/P (MAP)  135/60 (85) 131/75 (93) 


 


Pulse Ox  100 93 


 


O2 Delivery Room Air Room Air Room Air Room Air


 


    





    





 5/28/20 5/28/20 5/28/20 5/28/20





 19:40 19:56 20:00 20:26


 


Temp 98.8   





 98.8   


 


Pulse 95   


 


Resp 18 20  20


 


B/P (MAP) 115/62 (79)   


 


Pulse Ox 92 93  94


 


O2 Delivery Room Air Nasal Cannula Nasal Cannula Nasal Cannula


 


O2 Flow Rate  2.0 2.0 2.0


 


    





    





 5/28/20 5/29/20 5/29/20 5/29/20





 23:00 00:45 03:47 04:41


 


Temp 98.7  98.6 





 98.7  98.6 


 


Pulse 95  65 


 


Resp 18 20 18 


 


B/P (MAP) 120/60 (80)  131/81 (98) 


 


Pulse Ox 93 93 94 94


 


O2 Delivery Nasal Cannula Nasal Cannula Nasal Cannula Nasal Cannula


 


O2 Flow Rate 2.0 2.0 2.0 2.0


 


    





    





 5/29/20 5/29/20 5/29/20 





 06:15 06:45 07:00 


 


Temp   98.0 





   98.0 


 


Pulse   88 


 


Resp 20 18 18 


 


B/P (MAP)   137/77 (97) 


 


Pulse Ox 94 94 100 


 


O2 Delivery Nasal Cannula Nasal Cannula Room Air 


 


O2 Flow Rate 2.0 2.0  














Intake and Output   


 


 5/28/20 5/28/20 5/29/20





 15:00 23:00 07:00


 


Intake Total 240 ml  200 ml


 


Balance 240 ml  200 ml











Hemodynamically unstable?:  No


Is patient in severe pain?:  No


Is NPO status required?:  Yes











TITO ASHBY MD        May 29, 2020 08:55

## 2020-05-29 NOTE — NUR
SS following up with discharge planning. SS reviewed pt chart and discussed with pt RN. Per 
RN, pt is working to advance diet. Pt will discharge to home when ready. SS will continue to 
follow for discharge planning.

## 2020-05-30 VITALS — SYSTOLIC BLOOD PRESSURE: 115 MMHG | DIASTOLIC BLOOD PRESSURE: 56 MMHG

## 2020-05-30 VITALS — SYSTOLIC BLOOD PRESSURE: 135 MMHG | DIASTOLIC BLOOD PRESSURE: 74 MMHG

## 2020-05-30 VITALS — SYSTOLIC BLOOD PRESSURE: 122 MMHG | DIASTOLIC BLOOD PRESSURE: 64 MMHG

## 2020-05-30 VITALS — DIASTOLIC BLOOD PRESSURE: 68 MMHG | SYSTOLIC BLOOD PRESSURE: 125 MMHG

## 2020-05-30 VITALS — DIASTOLIC BLOOD PRESSURE: 65 MMHG | SYSTOLIC BLOOD PRESSURE: 135 MMHG

## 2020-05-30 VITALS — SYSTOLIC BLOOD PRESSURE: 132 MMHG | DIASTOLIC BLOOD PRESSURE: 63 MMHG

## 2020-05-30 LAB
ANION GAP SERPL CALC-SCNC: 13 MMOL/L (ref 6–14)
BASOPHILS # BLD AUTO: 0 X10^3/UL (ref 0–0.2)
BASOPHILS NFR BLD: 1 % (ref 0–3)
BUN SERPL-MCNC: 6 MG/DL (ref 7–20)
CALCIUM SERPL-MCNC: 8.2 MG/DL (ref 8.5–10.1)
CHLORIDE SERPL-SCNC: 100 MMOL/L (ref 98–107)
CO2 SERPL-SCNC: 23 MMOL/L (ref 21–32)
CREAT SERPL-MCNC: 0.5 MG/DL (ref 0.6–1)
EOSINOPHIL NFR BLD: 0.1 X10^3/UL (ref 0–0.7)
EOSINOPHIL NFR BLD: 4 % (ref 0–3)
ERYTHROCYTE [DISTWIDTH] IN BLOOD BY AUTOMATED COUNT: 16 % (ref 11.5–14.5)
GFR SERPLBLD BASED ON 1.73 SQ M-ARVRAT: 126.7 ML/MIN
GLUCOSE SERPL-MCNC: 171 MG/DL (ref 70–99)
HCT VFR BLD CALC: 36 % (ref 36–47)
HGB BLD-MCNC: 11.9 G/DL (ref 12–15.5)
LIPASE: 328 U/L (ref 73–393)
LYMPHOCYTES # BLD: 0.9 X10^3/UL (ref 1–4.8)
LYMPHOCYTES NFR BLD AUTO: 42 % (ref 24–48)
MCH RBC QN AUTO: 28 PG (ref 25–35)
MCHC RBC AUTO-ENTMCNC: 33 G/DL (ref 31–37)
MCV RBC AUTO: 84 FL (ref 79–100)
MONO #: 0.8 X10^3/UL (ref 0–1.1)
MONOCYTES NFR BLD: 37 % (ref 0–9)
NEUT #: 0.3 X10^3/UL (ref 1.8–7.7)
NEUTROPHILS NFR BLD AUTO: 16 % (ref 31–73)
PLATELET # BLD AUTO: 193 X10^3/UL (ref 140–400)
POTASSIUM SERPL-SCNC: 2.6 MMOL/L (ref 3.5–5.1)
RBC # BLD AUTO: 4.27 X10^6/UL (ref 3.5–5.4)
SODIUM SERPL-SCNC: 136 MMOL/L (ref 136–145)
WBC # BLD AUTO: 2 X10^3/UL (ref 4–11)

## 2020-05-30 RX ADMIN — FENTANYL CITRATE PRN MCG: 50 INJECTION INTRAMUSCULAR; INTRAVENOUS at 06:11

## 2020-05-30 RX ADMIN — BACITRACIN SCH MLS/HR: 5000 INJECTION, POWDER, FOR SOLUTION INTRAMUSCULAR at 09:01

## 2020-05-30 RX ADMIN — BACITRACIN SCH MLS/HR: 5000 INJECTION, POWDER, FOR SOLUTION INTRAMUSCULAR at 21:03

## 2020-05-30 RX ADMIN — POLYETHYLENE GLYCOL 3350 SCH GM: 17 POWDER, FOR SOLUTION ORAL at 15:49

## 2020-05-30 RX ADMIN — BACITRACIN SCH MLS/HR: 5000 INJECTION, POWDER, FOR SOLUTION INTRAMUSCULAR at 00:00

## 2020-05-30 RX ADMIN — INSULIN LISPRO SCH UNITS: 100 INJECTION, SOLUTION INTRAVENOUS; SUBCUTANEOUS at 18:15

## 2020-05-30 RX ADMIN — POTASSIUM CHLORIDE SCH MLS/HR: 7.46 INJECTION, SOLUTION INTRAVENOUS at 15:22

## 2020-05-30 RX ADMIN — FAMOTIDINE SCH MG: 10 INJECTION, SOLUTION INTRAVENOUS at 21:03

## 2020-05-30 RX ADMIN — INSULIN LISPRO SCH UNITS: 100 INJECTION, SOLUTION INTRAVENOUS; SUBCUTANEOUS at 04:00

## 2020-05-30 RX ADMIN — POTASSIUM CHLORIDE SCH MLS/HR: 7.46 INJECTION, SOLUTION INTRAVENOUS at 11:01

## 2020-05-30 RX ADMIN — PSYLLIUM HUSK SCH PKT: 3.4 POWDER ORAL at 15:49

## 2020-05-30 RX ADMIN — POTASSIUM CHLORIDE SCH MLS/HR: 7.46 INJECTION, SOLUTION INTRAVENOUS at 13:52

## 2020-05-30 RX ADMIN — INSULIN LISPRO SCH UNITS: 100 INJECTION, SOLUTION INTRAVENOUS; SUBCUTANEOUS at 00:00

## 2020-05-30 RX ADMIN — INSULIN LISPRO SCH UNITS: 100 INJECTION, SOLUTION INTRAVENOUS; SUBCUTANEOUS at 21:00

## 2020-05-30 RX ADMIN — ENOXAPARIN SODIUM SCH MG: 40 INJECTION SUBCUTANEOUS at 15:49

## 2020-05-30 RX ADMIN — FENTANYL CITRATE PRN MCG: 50 INJECTION INTRAMUSCULAR; INTRAVENOUS at 09:00

## 2020-05-30 RX ADMIN — INSULIN LISPRO SCH UNITS: 100 INJECTION, SOLUTION INTRAVENOUS; SUBCUTANEOUS at 08:00

## 2020-05-30 RX ADMIN — FENTANYL CITRATE PRN MCG: 50 INJECTION INTRAMUSCULAR; INTRAVENOUS at 02:32

## 2020-05-30 RX ADMIN — INSULIN LISPRO SCH UNITS: 100 INJECTION, SOLUTION INTRAVENOUS; SUBCUTANEOUS at 12:36

## 2020-05-30 RX ADMIN — POTASSIUM CHLORIDE SCH MLS/HR: 7.46 INJECTION, SOLUTION INTRAVENOUS at 12:31

## 2020-05-30 RX ADMIN — INSULIN LISPRO SCH UNITS: 100 INJECTION, SOLUTION INTRAVENOUS; SUBCUTANEOUS at 19:30

## 2020-05-30 NOTE — PDOC
PROGRESS NOTES


Chief Complaint


Chief Complaint


Acute pancreatitis - likely 2/2 hyper-triglyceridemia.  IgG4 RD less likely 

given level of 72 mg/DL and chronic immunosuppression on methotrexate and 

prednisone.  I discussed with [facility] rheumatology concern that MTX could play a 

role as well.  Hold dosing for leukopenia


Duodenal and jejunal intraluminal lipomas


Hypertriglyceridemia - will need outpatient Endocrinology referral for 

cholesterol metabolism treatment, I have suggested prescription 2g TID fish oil 

(Vascepa or Lovaza) in the meantime once pancreatitis resolves


Hyponatremia


DM2 with Hyperglycemia - A1c 13.6. Should continue on insulin therapy until A1c 

< 8, then consider PO options. Would probably have to avoid GLP1 therapy given 

her pancreatitis.  Would defer to an endocrinologist for this.


Celiac disease 


Adult onset stills disease


Anemia


Asthma


NATO on CPAP


GERD 


Leukopenia -likely related to chronic immunosuppression will hold MTX therapy 

for now and change to a prednisone taper on discharge.





FEN - Clear liquid diet


PPX - lovenox


FULL CODE


DIspo - inpatient for pancreatitis recovery





History of Present Illness


History of Present Illness


Ms Almanza is a 60yo F [employer]  w/ PMHx celiac disease, adult onset stills 

disease, Anemia, Asthma, NATO on CPAP, GERD who presents with severe upper 

abdominal pain, nausea, vomiting, fatigue. She states this started suddenly this

morning while she was working from home on teleconference (works as a 

on the  base). She only had oatmeal for breakfast.


She has never had anything similar to this in the past.  She generally feels 

unwell.  She is not had any fever, diarrhea, chest pain, shortness of breath, 

dysuria, hematuria, blood in her stools.  She denies alcohol abuse.  Pain does 

not move anywhere.  Nothing seems to make it better or worse.  She has not tried

anything at home.  He does not drink alcohol, and is status post 

cholecystectomy.  No calcium disorders.


She takes 20mg methotrexate weekly on Fridays and is on 3 mg of chronic 

prednisone for her stills disease.  She was diagnosed with celiac disease 

January 2020 and has just recently started on a gluten-free diet.  No recent 

sick contacts that she can recall.


Labs significant for lipase 05170, glucose 477, , bilirubin 1.1, Calcium 

8.3, K 3.7, WBC 6.3, Hb 15, Platelets 247


CT abdomen pelvis shows surgically absent gallbladder and surgically absent 

uterus and describes duodenal and jejunal intraluminal lipomas. Findings of 

acute pancreatitis. No loculated collections. No biliary dilatation.


Admitted for further treatment.





5/27: Lipase down. A1c 13.6, Triglycerides 3252. Started on insulin gtt.


5/28: Abdominal pain and glucose improved.  Having ice chips per GI.  Afebrile. 

No CP or SOB. D/w [facility] rheumatology, to hold MTX therapy on 5/29 given her 

leukopenia and pancreatitis.


5/29: Overnight afebrile.  Labs improved. Still with left-sided abdominal pain. 

WBC 1.8.  She notes she had not previously had a formal diagnosis of diabetes.  

No CP or SOB





Afebrile overnight.  Still with abdominal pain, no CP or SOB





Vitals


Vitals





Vital Signs








  Date Time  Temp Pulse Resp B/P (MAP) Pulse Ox O2 Delivery O2 Flow Rate FiO2


 


5/30/20 06:11   20   Room Air  


 


5/30/20 03:48 97.6 109  125/68 (87) 93   





 97.6       


 


5/29/20 17:08       2.0 











Physical Exam


General:  Alert, Oriented X3, Cooperative, moderate distress


Abdomen:  Normal bowel sounds, Soft, No hepatosplenomegaly, No masses, Other 

(Diffuse tenderness)


Extremities:  No clubbing, No cyanosis, No edema, Normal pulses, No 

tenderness/swelling


Skin:  No rashes, No breakdown, No significant lesion





Labs


LABS





Laboratory Tests








Test


 5/29/20


08:02 5/29/20


11:35 5/29/20


16:58 5/29/20


21:11


 


Glucose (Fingerstick)


 163 mg/dL


(70-99) 172 mg/dL


(70-99) 198 mg/dL


(70-99) 146 mg/dL


(70-99)


 


Test


 5/30/20


07:26 


 


 





 


Glucose (Fingerstick)


 144 mg/dL


(70-99) 


 


 














Assessment and Plan


Assessmemt and Plan


Problems


Medical Problems:


(1) Dehydration


Status: Acute  





(2) Hyperglycemia


Status: Acute  





(3) Pancreatitis, acute


Status: Acute  











Comment


Review of Relevant


I have reviewed the following items elizabeth (where applicable) has been applied.


Labs





Laboratory Tests








Test


 5/28/20


07:43 5/28/20


11:59 5/28/20


16:53 5/28/20


21:35


 


Glucose (Fingerstick)


 176 mg/dL


(70-99) 217 mg/dL


(70-99) 207 mg/dL


(70-99) 209 mg/dL


(70-99)


 


Test


 5/29/20


00:48 5/29/20


03:30 5/29/20


03:45 5/29/20


04:46


 


Glucose (Fingerstick)


 169 mg/dL


(70-99) 


 


 163 mg/dL


(70-99)


 


Sodium Level


 


 141 mmol/L


(136-145) 


 





 


Potassium Level


 


 3.5 mmol/L


(3.5-5.1) 


 





 


Chloride Level


 


 107 mmol/L


() 


 





 


Carbon Dioxide Level


 


 22 mmol/L


(21-32) 


 





 


Anion Gap  12 (6-14)   


 


Blood Urea Nitrogen


 


 14 mg/dL


(7-20) 


 





 


Creatinine


 


 0.6 mg/dL


(0.6-1.0) 


 





 


Estimated GFR


(Cockcroft-Gault) 


 102.7 


 


 





 


BUN/Creatinine Ratio  23 (6-20)   


 


Glucose Level


 


 192 mg/dL


(70-99) 


 





 


Calcium Level


 


 8.4 mg/dL


(8.5-10.1) 


 





 


Total Bilirubin


 


 1.0 mg/dL


(0.2-1.0) 


 





 


Aspartate Amino Transf


(AST/SGOT) 


 15 U/L (15-37) 


 


 





 


Alanine Aminotransferase


(ALT/SGPT) 


 18 U/L (14-59) 


 


 





 


Alkaline Phosphatase


 


 64 U/L


() 


 





 


Total Protein


 


 5.9 g/dL


(6.4-8.2) 


 





 


Albumin


 


 2.4 g/dL


(3.4-5.0) 


 





 


Albumin/Globulin Ratio  0.7 (1.0-1.7)   


 


Lipase


 


 521 U/L


() 


 





 


White Blood Count


 


 


 1.8 x10^3/uL


(4.0-11.0) 





 


Red Blood Count


 


 


 4.19 x10^6/uL


(3.50-5.40) 





 


Hemoglobin


 


 


 11.8 g/dL


(12.0-15.5) 





 


Hematocrit


 


 


 35.5 %


(36.0-47.0) 





 


Mean Corpuscular Volume   85 fL ()  


 


Mean Corpuscular Hemoglobin   28 pg (25-35)  


 


Mean Corpuscular Hemoglobin


Concent 


 


 33 g/dL


(31-37) 





 


Red Cell Distribution Width


 


 


 16.6 %


(11.5-14.5) 





 


Platelet Count


 


 


 171 x10^3/uL


(140-400) 





 


Neutrophils (%) (Auto)   16 % (31-73)  


 


Lymphocytes (%) (Auto)   55 % (24-48)  


 


Monocytes (%) (Auto)   28 % (0-9)  


 


Eosinophils (%) (Auto)   1 % (0-3)  


 


Basophils (%) (Auto)   0 % (0-3)  


 


Neutrophils # (Auto)


 


 


 0.3 x10^3/uL


(1.8-7.7) 





 


Lymphocytes # (Auto)


 


 


 1.0 x10^3/uL


(1.0-4.8) 





 


Monocytes # (Auto)


 


 


 0.5 x10^3/uL


(0.0-1.1) 





 


Eosinophils # (Auto)


 


 


 0.0 x10^3/uL


(0.0-0.7) 





 


Basophils # (Auto)


 


 


 0.0 x10^3/uL


(0.0-0.2) 





 


Test


 5/29/20


08:02 5/29/20


11:35 5/29/20


16:58 5/29/20


21:11


 


Glucose (Fingerstick)


 163 mg/dL


(70-99) 172 mg/dL


(70-99) 198 mg/dL


(70-99) 146 mg/dL


(70-99)


 


Test


 5/30/20


07:26 


 


 





 


Glucose (Fingerstick)


 144 mg/dL


(70-99) 


 


 











Laboratory Tests








Test


 5/29/20


08:02 5/29/20


11:35 5/29/20


16:58 5/29/20


21:11


 


Glucose (Fingerstick)


 163 mg/dL


(70-99) 172 mg/dL


(70-99) 198 mg/dL


(70-99) 146 mg/dL


(70-99)


 


Test


 5/30/20


07:26 


 


 





 


Glucose (Fingerstick)


 144 mg/dL


(70-99) 


 


 











Medications





Current Medications


Iohexol (Omnipaque 300 Mg/ml) 75 ml 1X  ONCE IV  Last administered on 5/26/20at 

11:30;  Start 5/26/20 at 11:30;  Stop 5/26/20 at 11:31;  Status DC


Iohexol (Omnipaque 240 Mg/ml) 50 ml 1X  ONCE PO  Last administered on 5/26/20at 

11:30;  Start 5/26/20 at 11:30;  Stop 5/26/20 at 11:31;  Status DC


Info (CONTRAST GIVEN -- Rx MONITORING) 1 each PRN DAILY  PRN MC SEE COMMENTS;  

Start 5/26/20 at 11:30;  Stop 5/28/20 at 11:29;  Status DC


Sodium Chloride 1,000 ml @  0 mls/hr 1X  ONCE IV  Last administered on 5/26/20at

12:27;  Start 5/26/20 at 12:15;  Stop 5/26/20 at 12:16;  Status DC


Morphine Sulfate (Morphine Sulfate) 5 mg 1X  ONCE IV  Last administered on 

5/26/20at 12:45;  Start 5/26/20 at 12:30;  Stop 5/26/20 at 12:33;  Status DC


Morphine Sulfate (Morphine Sulfate) 5 mg 1X  ONCE IV ;  Start 5/26/20 at 13:45; 

Stop 5/26/20 at 13:46;  Status DC


Morphine Sulfate (Morphine Sulfate) 4 mg PRN Q2HR  PRN IV MODERATE TO SEVERE 

PAIN Last administered on 5/28/20at 16:18;  Start 5/26/20 at 14:45


Sodium Chloride 1,000 ml @  100 mls/hr Q10H IV  Last administered on 5/30/20at 

00:00;  Start 5/26/20 at 14:44


Ondansetron HCl (Zofran) 4 mg PRN Q4HRS  PRN IV NAUSEA/VOMITING;  Start 5/26/20 

at 14:45


Acetaminophen (Tylenol Supp) 650 mg PRN Q4HRS  PRN DC TEMP OVER 100.4F OR MILD 

PAIN;  Start 5/26/20 at 14:45


Enoxaparin Sodium (Lovenox 40mg Syringe) 40 mg Q24H SQ  Last administered on 

5/29/20at 14:57;  Start 5/26/20 at 15:00


Insulin Human Lispro (HumaLOG) 0-9 UNITS Q4HRS SQ  Last administered on 

5/29/20at 17:13;  Start 5/26/20 at 16:00


Dextrose (Dextrose 50%-Water Syringe) 12.5 gm PRN Q15MIN  PRN IV SEE COMMENTS;  

Start 5/26/20 at 14:45


Albuterol Sulfate (Ventolin Neb Soln) 3 mg PRN QID  PRN NEB SHORTNESS OF BREATH;

 Start 5/26/20 at 15:15


Sodium Chloride (Saline Mist Nasal) 1 néstor PRN Q1HR  PRN NS NASAL CONGESTION;  

Start 5/26/20 at 15:15


Methylprednisolone Sodium Succinate (SOLU-Medrol 40MG VIAL) 40 mg DAILY IV  Last

administered on 5/28/20at 08:21;  Start 5/26/20 at 15:15;  Stop 5/28/20 at 

13:44;  Status DC


Insulin Human Regular 100 unit/ Sodium Chloride 101 ml @ 0 mls/hr CONT  PRN IV 

SEE I/O RECORD Last administered on 5/27/20at 02:12;  Start 5/26/20 at 18:00


Fentanyl Citrate (Fentanyl 2ml Vial) 50 mcg PRN Q2HR  PRN IVP PAIN Last 

administered on 5/30/20at 06:11;  Start 5/27/20 at 11:45


Famotidine (Pepcid Vial) 20 mg QHS IVP  Last administered on 5/29/20at 22:00;  

Start 5/28/20 at 21:00


Prednisone (Prednisone) 3 mg DAILY PO  Last administered on 5/29/20at 12:14;  

Start 5/29/20 at 09:00





Active Scripts


Active


Reported


Zyrtec (Cetirizine Hcl) 10 Mg Capsule 10 Mg PO DAILY


Methotrexate (Methotrexate Sodium) 2.5 Mg Tablet 8 Tab PO WEEKLY


Protonix  ** (Pantoprazole Sodium) 40 Mg Tablet.dr 40 Mg PO DAILYAC


Folic Acid 0.8 Mg Capsule 1 Cap PO DAILY 30 Days


Ferrous Sulfate 325 Mg Tablet 1 Tab PO DAILY


Vitals/I & O





Vital Sign - Last 24 Hours








 5/29/20 5/29/20 5/29/20 5/29/20





 09:20 09:50 11:00 12:13


 


Temp   98.0 





   98.0 


 


Pulse   90 


 


Resp   20 


 


B/P (MAP)   130/60 (83) 


 


Pulse Ox   96 


 


O2 Delivery Nasal Cannula Room Air Room Air Room Air


 


O2 Flow Rate 2.0   


 


    





    





 5/29/20 5/29/20 5/29/20 5/29/20





 12:45 14:29 15:00 15:00


 


Temp   98.3 





   98.3 


 


Pulse   90 


 


Resp   18 


 


B/P (MAP)   140/72 (94) 


 


Pulse Ox  96 100 


 


O2 Delivery Room Air Nasal Cannula Room Air Room Air


 


O2 Flow Rate  2.0  


 


    





    





 5/29/20 5/29/20 5/29/20 5/29/20





 17:08 17:45 19:35 19:42


 


Temp   99.7 





   99.7 


 


Pulse   109 


 


Resp   18 20


 


B/P (MAP)   140/67 (91) 


 


Pulse Ox   92 


 


O2 Delivery Nasal Cannula Room Air Room Air Room Air


 


O2 Flow Rate 2.0   


 


    





    





 5/29/20 5/29/20 5/29/20 5/29/20





 19:51 20:12 22:00 22:30


 


Resp  20 20 20


 


O2 Delivery Room Air Room Air Nasal Cannula Nasal Cannula





 5/29/20 5/29/20 5/30/20 5/30/20





 23:36 23:56 00:26 02:32


 


Temp 98.7   





 98.7   


 


Pulse 110   


 


Resp 18 20 20 20


 


B/P (MAP) 135/67 (89)   


 


Pulse Ox 93   


 


O2 Delivery Room Air Room Air Nasal Cannula Nasal Cannula


 


    





    





 5/30/20 5/30/20 5/30/20 





 03:02 03:48 06:11 


 


Temp  97.6  





  97.6  


 


Pulse  109  


 


Resp 20 18 20 


 


B/P (MAP)  125/68 (87)  


 


Pulse Ox  93  


 


O2 Delivery  Room Air Room Air 














Intake and Output   


 


 5/29/20 5/29/20 5/30/20





 15:00 23:00 07:00


 


Intake Total 180 ml  1420 ml


 


Balance 180 ml  1420 ml











Hemodynamically unstable?:  No


Is patient in severe pain?:  No


Is NPO status required?:  No











TITO ASHBY MD        May 30, 2020 07:42

## 2020-05-31 VITALS — DIASTOLIC BLOOD PRESSURE: 59 MMHG | SYSTOLIC BLOOD PRESSURE: 112 MMHG

## 2020-05-31 VITALS — DIASTOLIC BLOOD PRESSURE: 65 MMHG | SYSTOLIC BLOOD PRESSURE: 116 MMHG

## 2020-05-31 VITALS — SYSTOLIC BLOOD PRESSURE: 122 MMHG | DIASTOLIC BLOOD PRESSURE: 62 MMHG

## 2020-05-31 VITALS — SYSTOLIC BLOOD PRESSURE: 124 MMHG | DIASTOLIC BLOOD PRESSURE: 64 MMHG

## 2020-05-31 VITALS — SYSTOLIC BLOOD PRESSURE: 124 MMHG | DIASTOLIC BLOOD PRESSURE: 68 MMHG

## 2020-05-31 VITALS — SYSTOLIC BLOOD PRESSURE: 133 MMHG | DIASTOLIC BLOOD PRESSURE: 69 MMHG

## 2020-05-31 LAB
ANION GAP SERPL CALC-SCNC: 12 MMOL/L (ref 6–14)
BASOPHILS # BLD AUTO: 0 X10^3/UL (ref 0–0.2)
BASOPHILS NFR BLD: 0 % (ref 0–3)
BUN SERPL-MCNC: 5 MG/DL (ref 7–20)
CALCIUM SERPL-MCNC: 8.2 MG/DL (ref 8.5–10.1)
CHLORIDE SERPL-SCNC: 102 MMOL/L (ref 98–107)
CO2 SERPL-SCNC: 22 MMOL/L (ref 21–32)
CREAT SERPL-MCNC: 0.5 MG/DL (ref 0.6–1)
EOSINOPHIL NFR BLD: 0.1 X10^3/UL (ref 0–0.7)
EOSINOPHIL NFR BLD: 4 % (ref 0–3)
ERYTHROCYTE [DISTWIDTH] IN BLOOD BY AUTOMATED COUNT: 16.3 % (ref 11.5–14.5)
GFR SERPLBLD BASED ON 1.73 SQ M-ARVRAT: 126.7 ML/MIN
GLUCOSE SERPL-MCNC: 172 MG/DL (ref 70–99)
HCT VFR BLD CALC: 34.5 % (ref 36–47)
HGB BLD-MCNC: 11.3 G/DL (ref 12–15.5)
LYMPHOCYTES # BLD: 0.7 X10^3/UL (ref 1–4.8)
LYMPHOCYTES NFR BLD AUTO: 37 % (ref 24–48)
MAGNESIUM SERPL-MCNC: 1.8 MG/DL (ref 1.8–2.4)
MCH RBC QN AUTO: 28 PG (ref 25–35)
MCHC RBC AUTO-ENTMCNC: 33 G/DL (ref 31–37)
MCV RBC AUTO: 85 FL (ref 79–100)
MONO #: 0.8 X10^3/UL (ref 0–1.1)
MONOCYTES NFR BLD: 44 % (ref 0–9)
NEUT #: 0.3 X10^3/UL (ref 1.8–7.7)
NEUTROPHILS NFR BLD AUTO: 14 % (ref 31–73)
PLATELET # BLD AUTO: 201 X10^3/UL (ref 140–400)
POTASSIUM SERPL-SCNC: 3.1 MMOL/L (ref 3.5–5.1)
RBC # BLD AUTO: 4.05 X10^6/UL (ref 3.5–5.4)
SODIUM SERPL-SCNC: 136 MMOL/L (ref 136–145)
WBC # BLD AUTO: 1.9 X10^3/UL (ref 4–11)

## 2020-05-31 RX ADMIN — INSULIN LISPRO SCH UNITS: 100 INJECTION, SOLUTION INTRAVENOUS; SUBCUTANEOUS at 21:00

## 2020-05-31 RX ADMIN — PSYLLIUM HUSK SCH PKT: 3.4 POWDER ORAL at 09:17

## 2020-05-31 RX ADMIN — INSULIN LISPRO SCH UNITS: 100 INJECTION, SOLUTION INTRAVENOUS; SUBCUTANEOUS at 12:02

## 2020-05-31 RX ADMIN — INSULIN LISPRO SCH UNITS: 100 INJECTION, SOLUTION INTRAVENOUS; SUBCUTANEOUS at 17:00

## 2020-05-31 RX ADMIN — POLYETHYLENE GLYCOL 3350 SCH GM: 17 POWDER, FOR SOLUTION ORAL at 09:17

## 2020-05-31 RX ADMIN — BACITRACIN SCH MLS/HR: 5000 INJECTION, POWDER, FOR SOLUTION INTRAMUSCULAR at 09:18

## 2020-05-31 RX ADMIN — BACITRACIN SCH MLS/HR: 5000 INJECTION, POWDER, FOR SOLUTION INTRAMUSCULAR at 18:14

## 2020-05-31 RX ADMIN — INSULIN LISPRO SCH UNITS: 100 INJECTION, SOLUTION INTRAVENOUS; SUBCUTANEOUS at 08:00

## 2020-05-31 RX ADMIN — BACITRACIN SCH MLS/HR: 5000 INJECTION, POWDER, FOR SOLUTION INTRAMUSCULAR at 22:16

## 2020-05-31 RX ADMIN — ONDANSETRON PRN MG: 2 INJECTION INTRAMUSCULAR; INTRAVENOUS at 12:03

## 2020-05-31 RX ADMIN — FAMOTIDINE SCH MG: 10 INJECTION, SOLUTION INTRAVENOUS at 22:14

## 2020-05-31 RX ADMIN — ENOXAPARIN SODIUM SCH MG: 40 INJECTION SUBCUTANEOUS at 18:13

## 2020-05-31 NOTE — PDOC
PROGRESS NOTES


Chief Complaint


Chief Complaint


Acute pancreatitis - likely 2/2 hyper-triglyceridemia.  IgG4 RD less likely 

given level of 72 mg/DL and chronic immunosuppression on methotrexate and 

prednisone.  I discussed with [facility] rheumatology concern that MTX could play a 

role as well.  Hold dosing for leukopenia


Duodenal and jejunal intraluminal lipomas


Hypertriglyceridemia - will need outpatient Endocrinology referral for 

cholesterol metabolism treatment, I have suggested prescription 2g TID fish oil 

(Vascepa or Lovaza) in the meantime once pancreatitis resolves


Hyponatremia


DM2 with Hyperglycemia - A1c 13.6. Should continue on insulin therapy until A1c 

< 8, then consider PO options. Would probably have to avoid GLP1 therapy given 

her pancreatitis.  Would defer to an endocrinologist for this.


Celiac disease 


Adult onset stills disease


Anemia


Asthma


NATO on CPAP


GERD 


Leukopenia -likely related to chronic immunosuppression will hold MTX therapy 

for now and change to a prednisone taper on discharge.





FEN - Clear liquid diet


PPX - lovenox


FULL CODE


DIspo - inpatient for pancreatitis recovery





History of Present Illness


History of Present Illness


Ms Almanza is a 60yo F [employer]  w/ PMHx celiac disease, adult onset stills 

disease, Anemia, Asthma, NATO on CPAP, GERD who presents with severe upper 

abdominal pain, nausea, vomiting, fatigue. She states this started suddenly this

morning while she was working from home on teleconference (works as a 

on the  base). She only had oatmeal for breakfast.


She has never had anything similar to this in the past.  She generally feels 

unwell.  She is not had any fever, diarrhea, chest pain, shortness of breath, 

dysuria, hematuria, blood in her stools.  She denies alcohol abuse.  Pain does 

not move anywhere.  Nothing seems to make it better or worse.  She has not tried

anything at home.  He does not drink alcohol, and is status post 

cholecystectomy.  No calcium disorders.


She takes 20mg methotrexate weekly on Fridays and is on 3 mg of chronic 

prednisone for her stills disease.  She was diagnosed with celiac disease 

January 2020 and has just recently started on a gluten-free diet.  No recent 

sick contacts that she can recall.


Labs significant for lipase 28069, glucose 477, , bilirubin 1.1, Calcium 

8.3, K 3.7, WBC 6.3, Hb 15, Platelets 247


CT abdomen pelvis shows surgically absent gallbladder and surgically absent 

uterus and describes duodenal and jejunal intraluminal lipomas. Findings of 

acute pancreatitis. No loculated collections. No biliary dilatation.


Admitted for further treatment.





5/27: Lipase down. A1c 13.6, Triglycerides 3252. Started on insulin gtt.


5/28: Abdominal pain and glucose improved.  Having ice chips per GI.  Afebrile. 

No CP or SOB. D/w [facility] rheumatology, to hold MTX therapy on 5/29 given her 

leukopenia and pancreatitis.


5/29: Overnight afebrile.  Labs improved. Still with left-sided abdominal pain. 

WBC 1.8.  She notes she had not previously had a formal diagnosis of diabetes.  

No CP or SOB


5/30: Pain improved, advancing diet per GI lipase 328.  WBC 2, K2.6, MG 1.4





Afebrile overnight.  Still with abdominal pain, no CP or SOB





Vitals


Vitals





Vital Signs








  Date Time  Temp Pulse Resp B/P (MAP) Pulse Ox O2 Delivery O2 Flow Rate FiO2


 


5/31/20 07:18      Room Air  


 


5/31/20 05:35       2.0 


 


5/31/20 03:00 99.3 96 18 112/59 (76) 96   





 99.3       











Physical Exam


General:  Alert, Oriented X3, Cooperative, moderate distress


Abdomen:  Normal bowel sounds, Soft, No hepatosplenomegaly, No masses, Other 

(Diffuse tenderness)


Extremities:  No clubbing, No cyanosis, No edema, Normal pulses, No 

tenderness/swelling


Skin:  No rashes, No breakdown, No significant lesion





Labs


LABS





Laboratory Tests








Test


 5/30/20


09:15 5/30/20


12:05 5/30/20


16:48 5/30/20


20:03


 


White Blood Count


 2.0 x10^3/uL


(4.0-11.0) 


 


 





 


Red Blood Count


 4.27 x10^6/uL


(3.50-5.40) 


 


 





 


Hemoglobin


 11.9 g/dL


(12.0-15.5) 


 


 





 


Hematocrit


 36.0 %


(36.0-47.0) 


 


 





 


Mean Corpuscular Volume 84 fL ()    


 


Mean Corpuscular Hemoglobin 28 pg (25-35)    


 


Mean Corpuscular Hemoglobin


Concent 33 g/dL


(31-37) 


 


 





 


Red Cell Distribution Width


 16.0 %


(11.5-14.5) 


 


 





 


Platelet Count


 193 x10^3/uL


(140-400) 


 


 





 


Neutrophils (%) (Auto) 16 % (31-73)    


 


Lymphocytes (%) (Auto) 42 % (24-48)    


 


Monocytes (%) (Auto) 37 % (0-9)    


 


Eosinophils (%) (Auto) 4 % (0-3)    


 


Basophils (%) (Auto) 1 % (0-3)    


 


Neutrophils # (Auto)


 0.3 x10^3/uL


(1.8-7.7) 


 


 





 


Lymphocytes # (Auto)


 0.9 x10^3/uL


(1.0-4.8) 


 


 





 


Monocytes # (Auto)


 0.8 x10^3/uL


(0.0-1.1) 


 


 





 


Eosinophils # (Auto)


 0.1 x10^3/uL


(0.0-0.7) 


 


 





 


Basophils # (Auto)


 0.0 x10^3/uL


(0.0-0.2) 


 


 





 


Sodium Level


 136 mmol/L


(136-145) 


 


 





 


Potassium Level


 2.6 mmol/L


(3.5-5.1) 


 


 





 


Chloride Level


 100 mmol/L


() 


 


 





 


Carbon Dioxide Level


 23 mmol/L


(21-32) 


 


 





 


Anion Gap 13 (6-14)    


 


Blood Urea Nitrogen 6 mg/dL (7-20)    


 


Creatinine


 0.5 mg/dL


(0.6-1.0) 


 


 





 


Estimated GFR


(Cockcroft-Gault) 126.7 


 


 


 





 


Glucose Level


 171 mg/dL


(70-99) 


 


 





 


Calcium Level


 8.2 mg/dL


(8.5-10.1) 


 


 





 


Magnesium Level


 1.4 mg/dL


(1.8-2.4) 


 


 





 


Lipase


 328 U/L


() 


 


 





 


Glucose (Fingerstick)


 


 173 mg/dL


(70-99) 156 mg/dL


(70-99) 162 mg/dL


(70-99)


 


Test


 5/31/20


07:54 


 


 





 


Glucose (Fingerstick)


 154 mg/dL


(70-99) 


 


 














Assessment and Plan


Assessmemt and Plan


Problems


Medical Problems:


(1) Dehydration


Status: Acute  





(2) Hyperglycemia


Status: Acute  





(3) Pancreatitis, acute


Status: Acute  











Comment


Review of Relevant


I have reviewed the following items elizabeth (where applicable) has been applied.


Labs





Laboratory Tests








Test


 5/29/20


08:02 5/29/20


11:35 5/29/20


16:58 5/29/20


21:11


 


Glucose (Fingerstick)


 163 mg/dL


(70-99) 172 mg/dL


(70-99) 198 mg/dL


(70-99) 146 mg/dL


(70-99)


 


Test


 5/30/20


07:26 5/30/20


09:15 5/30/20


12:05 5/30/20


16:48


 


Glucose (Fingerstick)


 144 mg/dL


(70-99) 


 173 mg/dL


(70-99) 156 mg/dL


(70-99)


 


White Blood Count


 


 2.0 x10^3/uL


(4.0-11.0) 


 





 


Red Blood Count


 


 4.27 x10^6/uL


(3.50-5.40) 


 





 


Hemoglobin


 


 11.9 g/dL


(12.0-15.5) 


 





 


Hematocrit


 


 36.0 %


(36.0-47.0) 


 





 


Mean Corpuscular Volume  84 fL ()   


 


Mean Corpuscular Hemoglobin  28 pg (25-35)   


 


Mean Corpuscular Hemoglobin


Concent 


 33 g/dL


(31-37) 


 





 


Red Cell Distribution Width


 


 16.0 %


(11.5-14.5) 


 





 


Platelet Count


 


 193 x10^3/uL


(140-400) 


 





 


Neutrophils (%) (Auto)  16 % (31-73)   


 


Lymphocytes (%) (Auto)  42 % (24-48)   


 


Monocytes (%) (Auto)  37 % (0-9)   


 


Eosinophils (%) (Auto)  4 % (0-3)   


 


Basophils (%) (Auto)  1 % (0-3)   


 


Neutrophils # (Auto)


 


 0.3 x10^3/uL


(1.8-7.7) 


 





 


Lymphocytes # (Auto)


 


 0.9 x10^3/uL


(1.0-4.8) 


 





 


Monocytes # (Auto)


 


 0.8 x10^3/uL


(0.0-1.1) 


 





 


Eosinophils # (Auto)


 


 0.1 x10^3/uL


(0.0-0.7) 


 





 


Basophils # (Auto)


 


 0.0 x10^3/uL


(0.0-0.2) 


 





 


Sodium Level


 


 136 mmol/L


(136-145) 


 





 


Potassium Level


 


 2.6 mmol/L


(3.5-5.1) 


 





 


Chloride Level


 


 100 mmol/L


() 


 





 


Carbon Dioxide Level


 


 23 mmol/L


(21-32) 


 





 


Anion Gap  13 (6-14)   


 


Blood Urea Nitrogen  6 mg/dL (7-20)   


 


Creatinine


 


 0.5 mg/dL


(0.6-1.0) 


 





 


Estimated GFR


(Cockcroft-Gault) 


 126.7 


 


 





 


Glucose Level


 


 171 mg/dL


(70-99) 


 





 


Calcium Level


 


 8.2 mg/dL


(8.5-10.1) 


 





 


Magnesium Level


 


 1.4 mg/dL


(1.8-2.4) 


 





 


Lipase


 


 328 U/L


() 


 





 


Test


 5/30/20


20:03 5/31/20


07:54 


 





 


Glucose (Fingerstick)


 162 mg/dL


(70-99) 154 mg/dL


(70-99) 


 











Laboratory Tests








Test


 5/30/20


09:15 5/30/20


12:05 5/30/20


16:48 5/30/20


20:03


 


White Blood Count


 2.0 x10^3/uL


(4.0-11.0) 


 


 





 


Red Blood Count


 4.27 x10^6/uL


(3.50-5.40) 


 


 





 


Hemoglobin


 11.9 g/dL


(12.0-15.5) 


 


 





 


Hematocrit


 36.0 %


(36.0-47.0) 


 


 





 


Mean Corpuscular Volume 84 fL ()    


 


Mean Corpuscular Hemoglobin 28 pg (25-35)    


 


Mean Corpuscular Hemoglobin


Concent 33 g/dL


(31-37) 


 


 





 


Red Cell Distribution Width


 16.0 %


(11.5-14.5) 


 


 





 


Platelet Count


 193 x10^3/uL


(140-400) 


 


 





 


Neutrophils (%) (Auto) 16 % (31-73)    


 


Lymphocytes (%) (Auto) 42 % (24-48)    


 


Monocytes (%) (Auto) 37 % (0-9)    


 


Eosinophils (%) (Auto) 4 % (0-3)    


 


Basophils (%) (Auto) 1 % (0-3)    


 


Neutrophils # (Auto)


 0.3 x10^3/uL


(1.8-7.7) 


 


 





 


Lymphocytes # (Auto)


 0.9 x10^3/uL


(1.0-4.8) 


 


 





 


Monocytes # (Auto)


 0.8 x10^3/uL


(0.0-1.1) 


 


 





 


Eosinophils # (Auto)


 0.1 x10^3/uL


(0.0-0.7) 


 


 





 


Basophils # (Auto)


 0.0 x10^3/uL


(0.0-0.2) 


 


 





 


Sodium Level


 136 mmol/L


(136-145) 


 


 





 


Potassium Level


 2.6 mmol/L


(3.5-5.1) 


 


 





 


Chloride Level


 100 mmol/L


() 


 


 





 


Carbon Dioxide Level


 23 mmol/L


(21-32) 


 


 





 


Anion Gap 13 (6-14)    


 


Blood Urea Nitrogen 6 mg/dL (7-20)    


 


Creatinine


 0.5 mg/dL


(0.6-1.0) 


 


 





 


Estimated GFR


(Cockcroft-Gault) 126.7 


 


 


 





 


Glucose Level


 171 mg/dL


(70-99) 


 


 





 


Calcium Level


 8.2 mg/dL


(8.5-10.1) 


 


 





 


Magnesium Level


 1.4 mg/dL


(1.8-2.4) 


 


 





 


Lipase


 328 U/L


() 


 


 





 


Glucose (Fingerstick)


 


 173 mg/dL


(70-99) 156 mg/dL


(70-99) 162 mg/dL


(70-99)


 


Test


 5/31/20


07:54 


 


 





 


Glucose (Fingerstick)


 154 mg/dL


(70-99) 


 


 











Medications





Current Medications


Iohexol (Omnipaque 300 Mg/ml) 75 ml 1X  ONCE IV  Last administered on 5/26/20at 

11:30;  Start 5/26/20 at 11:30;  Stop 5/26/20 at 11:31;  Status DC


Iohexol (Omnipaque 240 Mg/ml) 50 ml 1X  ONCE PO  Last administered on 5/26/20at 

11:30;  Start 5/26/20 at 11:30;  Stop 5/26/20 at 11:31;  Status DC


Info (CONTRAST GIVEN -- Rx MONITORING) 1 each PRN DAILY  PRN MC SEE COMMENTS;  

Start 5/26/20 at 11:30;  Stop 5/28/20 at 11:29;  Status DC


Sodium Chloride 1,000 ml @  0 mls/hr 1X  ONCE IV  Last administered on 5/26/20at

12:27;  Start 5/26/20 at 12:15;  Stop 5/26/20 at 12:16;  Status DC


Morphine Sulfate (Morphine Sulfate) 5 mg 1X  ONCE IV  Last administered on 

5/26/20at 12:45;  Start 5/26/20 at 12:30;  Stop 5/26/20 at 12:33;  Status DC


Morphine Sulfate (Morphine Sulfate) 5 mg 1X  ONCE IV ;  Start 5/26/20 at 13:45; 

Stop 5/26/20 at 13:46;  Status DC


Morphine Sulfate (Morphine Sulfate) 4 mg PRN Q2HR  PRN IV MODERATE TO SEVERE 

PAIN Last administered on 5/28/20at 16:18;  Start 5/26/20 at 14:45


Sodium Chloride 1,000 ml @  100 mls/hr Q10H IV  Last administered on 5/30/20at 

21:03;  Start 5/26/20 at 14:44


Ondansetron HCl (Zofran) 4 mg PRN Q4HRS  PRN IV NAUSEA/VOMITING;  Start 5/26/20 

at 14:45


Acetaminophen (Tylenol Supp) 650 mg PRN Q4HRS  PRN RI TEMP OVER 100.4F OR MILD 

PAIN;  Start 5/26/20 at 14:45


Enoxaparin Sodium (Lovenox 40mg Syringe) 40 mg Q24H SQ  Last administered on 

5/30/20at 15:49;  Start 5/26/20 at 15:00


Insulin Human Lispro (HumaLOG) 0-9 UNITS Q4HRS SQ  Last administered on 

5/30/20at 18:15;  Start 5/26/20 at 16:00;  Stop 5/30/20 at 19:32;  Status DC


Dextrose (Dextrose 50%-Water Syringe) 12.5 gm PRN Q15MIN  PRN IV SEE COMMENTS;  

Start 5/26/20 at 14:45


Albuterol Sulfate (Ventolin Neb Soln) 3 mg PRN QID  PRN NEB SHORTNESS OF BREATH;

 Start 5/26/20 at 15:15


Sodium Chloride (Saline Mist Nasal) 1 néstor PRN Q1HR  PRN NS NASAL CONGESTION;  

Start 5/26/20 at 15:15


Methylprednisolone Sodium Succinate (SOLU-Medrol 40MG VIAL) 40 mg DAILY IV  Last

administered on 5/28/20at 08:21;  Start 5/26/20 at 15:15;  Stop 5/28/20 at 

13:44;  Status DC


Insulin Human Regular 100 unit/ Sodium Chloride 101 ml @ 0 mls/hr CONT  PRN IV 

SEE I/O RECORD Last administered on 5/27/20at 02:12;  Start 5/26/20 at 18:00


Fentanyl Citrate (Fentanyl 2ml Vial) 50 mcg PRN Q2HR  PRN IVP MOD TO SEVERE 

PAIN, 2ND CHOICE Last administered on 5/30/20at 09:00;  Start 5/27/20 at 11:45


Famotidine (Pepcid Vial) 20 mg QHS IVP  Last administered on 5/30/20at 21:03;  

Start 5/28/20 at 21:00


Prednisone (Prednisone) 3 mg DAILY PO  Last administered on 5/30/20at 09:00;  

Start 5/29/20 at 09:00


Potassium Chloride/Water 100 ml @  100 mls/hr Q1H IV  Last administered on 

5/30/20at 15:22;  Start 5/30/20 at 11:00;  Stop 5/30/20 at 14:59;  Status DC


Potassium Chloride (Klor-Con) 40 meq 1X  ONCE PO  Last administered on 5/30/20at

11:01;  Start 5/30/20 at 10:15;  Stop 5/30/20 at 10:22;  Status DC


Tramadol HCl (Ultram) 50 mg PRN Q6HRS  PRN PO MODERATE PAIN 4-6 Last 

administered on 5/30/20at 21:12;  Start 5/30/20 at 10:15


Oxycodone HCl (Roxicodone) 5 mg PRN Q6HRS  PRN PO SEVERE PAIN 7-10 Last 

administered on 5/30/20at 11:08;  Start 5/30/20 at 10:15;  Stop 5/30/20 at 

15:30;  Status DC


Magnesium Sulfate 100 ml @  25 mls/hr 1X  ONCE IV  Last administered on 

5/30/20at 11:06;  Start 5/30/20 at 11:00;  Stop 5/30/20 at 14:59;  Status DC


Magnesium Hydroxide (Milk Of Magnesia) 2,400 mg PRN DAILY  PRN PO CONSTIPATION 

2ND CHOICE;  Start 5/30/20 at 15:30


Psyllium Hydrophilic Mucilloid (Metamucil Fiber Packet) 1 pkt DAILY PO  Last adm

inistered on 5/30/20at 15:49;  Start 5/30/20 at 15:30


Polyethylene Glycol (miraLAX PACKET) 17 gm DAILY PO  Last administered on 

5/30/20at 15:49;  Start 5/30/20 at 15:30


Oxycodone HCl (Roxicodone) 5 mg PRN Q4HRS  PRN PO SEVERE PAIN 7-10 Last 

administered on 5/31/20at 04:35;  Start 5/30/20 at 15:30


Insulin Human Lispro (HumaLOG) 0-9 UNITS [id number] SQ ;  Start 5/30/20 at 19:30





Active Scripts


Active


Reported


Zyrtec (Cetirizine Hcl) 10 Mg Capsule 10 Mg PO DAILY


Methotrexate (Methotrexate Sodium) 2.5 Mg Tablet 8 Tab PO WEEKLY


Protonix  ** (Pantoprazole Sodium) 40 Mg Tablet.dr 40 Mg PO DAILYAC


Folic Acid 0.8 Mg Capsule 1 Cap PO DAILY 30 Days


Ferrous Sulfate 325 Mg Tablet 1 Tab PO DAILY


Vitals/I & O





Vital Sign - Last 24 Hours








 5/30/20 5/30/20 5/30/20 5/30/20





 08:02 09:00 09:30 11:08


 


O2 Delivery Room Air Room Air Room Air Room Air





 5/30/20 5/30/20 5/30/20 5/30/20





 11:45 12:10 15:38 15:49


 


Temp 98.3  98.5 





 98.3  98.5 


 


Pulse 101  95 


 


Resp 18  18 


 


B/P (MAP) 132/63 (86)  115/56 (75) 


 


Pulse Ox 96  96 


 


O2 Delivery Room Air Room Air Room Air Room Air


 


    





    





 5/30/20 5/30/20 5/30/20 5/30/20





 18:15 19:00 19:50 21:12


 


Temp  98.9  





  98.9  


 


Pulse  99  


 


Resp  18  


 


B/P (MAP)  122/64 (83)  


 


Pulse Ox  94  


 


O2 Delivery Room Air Room Air Nasal Cannula Nasal Cannula


 


O2 Flow Rate   2.0 2.0


 


    





    





 5/30/20 5/30/20 5/30/20 5/30/20





 22:38 22:42 23:00 23:42


 


Temp   99.5 





   99.5 


 


Pulse   107 


 


Resp   18 


 


B/P (MAP)   135/74 (94) 


 


Pulse Ox   95 


 


O2 Delivery Nasal Cannula Nasal Cannula Room Air Nasal Cannula


 


O2 Flow Rate 2.0 2.0  2.0


 


    





    





 5/31/20 5/31/20 5/31/20 5/31/20





 03:00 04:35 05:35 07:18


 


Temp 99.3   





 99.3   


 


Pulse 96   


 


Resp 18   


 


B/P (MAP) 112/59 (76)   


 


Pulse Ox 96   


 


O2 Delivery Room Air Nasal Cannula Nasal Cannula Room Air


 


O2 Flow Rate  2.0 2.0 














Intake and Output   


 


 5/30/20 5/30/20 5/31/20





 15:00 23:00 07:00


 


Intake Total 450 ml 400 ml 200 ml


 


Balance 450 ml 400 ml 200 ml











Hemodynamically unstable?:  No


Is patient in severe pain?:  No


Is NPO status required?:  No











TITO ASHBY MD        May 31, 2020 07:59

## 2020-06-01 VITALS — DIASTOLIC BLOOD PRESSURE: 60 MMHG | SYSTOLIC BLOOD PRESSURE: 117 MMHG

## 2020-06-01 VITALS — SYSTOLIC BLOOD PRESSURE: 132 MMHG | DIASTOLIC BLOOD PRESSURE: 71 MMHG

## 2020-06-01 VITALS — DIASTOLIC BLOOD PRESSURE: 55 MMHG | SYSTOLIC BLOOD PRESSURE: 107 MMHG

## 2020-06-01 VITALS — DIASTOLIC BLOOD PRESSURE: 69 MMHG | SYSTOLIC BLOOD PRESSURE: 128 MMHG

## 2020-06-01 VITALS — SYSTOLIC BLOOD PRESSURE: 113 MMHG | DIASTOLIC BLOOD PRESSURE: 74 MMHG

## 2020-06-01 VITALS — DIASTOLIC BLOOD PRESSURE: 70 MMHG | SYSTOLIC BLOOD PRESSURE: 140 MMHG

## 2020-06-01 RX ADMIN — INSULIN LISPRO SCH UNITS: 100 INJECTION, SOLUTION INTRAVENOUS; SUBCUTANEOUS at 21:00

## 2020-06-01 RX ADMIN — ENOXAPARIN SODIUM SCH MG: 40 INJECTION SUBCUTANEOUS at 15:50

## 2020-06-01 RX ADMIN — INSULIN LISPRO SCH UNITS: 100 INJECTION, SOLUTION INTRAVENOUS; SUBCUTANEOUS at 08:34

## 2020-06-01 RX ADMIN — ONDANSETRON PRN MG: 2 INJECTION INTRAMUSCULAR; INTRAVENOUS at 20:53

## 2020-06-01 RX ADMIN — PSYLLIUM HUSK SCH PKT: 3.4 POWDER ORAL at 08:28

## 2020-06-01 RX ADMIN — BACITRACIN SCH MLS/HR: 5000 INJECTION, POWDER, FOR SOLUTION INTRAMUSCULAR at 08:27

## 2020-06-01 RX ADMIN — INSULIN LISPRO SCH UNITS: 100 INJECTION, SOLUTION INTRAVENOUS; SUBCUTANEOUS at 17:14

## 2020-06-01 RX ADMIN — INSULIN LISPRO SCH UNITS: 100 INJECTION, SOLUTION INTRAVENOUS; SUBCUTANEOUS at 12:00

## 2020-06-01 RX ADMIN — POLYETHYLENE GLYCOL 3350 SCH GM: 17 POWDER, FOR SOLUTION ORAL at 08:28

## 2020-06-01 NOTE — PDOC
PROGRESS NOTES


Chief Complaint


Chief Complaint


Acute pancreatitis - likely 2/2 hyper-triglyceridemia.  IgG4 RD less likely 

given level of 72 mg/DL and chronic immunosuppression on methotrexate and 

prednisone.  I discussed with [facility] rheumatology concern that MTX could play a 

role as well.  Hold dosing for leukopenia


Duodenal and jejunal intraluminal lipomas


Hypertriglyceridemia - will need outpatient Endocrinology referral for 

cholesterol metabolism treatment, I have suggested prescription 2g TID fish oil 

(Vascepa or Lovaza) in the meantime once pancreatitis resolves


Hyponatremia


DM2 with Hyperglycemia - A1c 13.6. Should continue on insulin therapy until A1c 

< 8, then consider PO options. Would probably have to avoid GLP1 therapy given 

her pancreatitis.  Would defer to an endocrinologist for this.


Celiac disease 


Adult onset stills disease


Anemia


Asthma


NATO on CPAP


GERD 


Leukopenia -likely related to chronic immunosuppression will hold MTX therapy 

for now and change to a prednisone taper on discharge.





FEN - Clear liquid diet


PPX - lovenox


FULL CODE


DIspo - inpatient for pancreatitis recovery





History of Present Illness


History of Present Illness


Ms Almanza is a 58yo F [employer]  w/ PMHx celiac disease, adult onset stills 

disease, Anemia, Asthma, NATO on CPAP, GERD who presents with severe upper 

abdominal pain, nausea, vomiting, fatigue. She states this started suddenly this

morning while she was working from home on teleconference (works as a 

on the  base). She only had oatmeal for breakfast.


She has never had anything similar to this in the past.  She generally feels 

unwell.  She is not had any fever, diarrhea, chest pain, shortness of breath, 

dysuria, hematuria, blood in her stools.  She denies alcohol abuse.  Pain does 

not move anywhere.  Nothing seems to make it better or worse.  She has not tried

anything at home.  He does not drink alcohol, and is status post 

cholecystectomy.  No calcium disorders.


She takes 20mg methotrexate weekly on Fridays and is on 3 mg of chronic 

prednisone for her stills disease.  She was diagnosed with celiac disease 

January 2020 and has just recently started on a gluten-free diet.  No recent 

sick contacts that she can recall.


Labs significant for lipase 33434, glucose 477, , bilirubin 1.1, Calcium 

8.3, K 3.7, WBC 6.3, Hb 15, Platelets 247


CT abdomen pelvis shows surgically absent gallbladder and surgically absent 

uterus and describes duodenal and jejunal intraluminal lipomas. Findings of 

acute pancreatitis. No loculated collections. No biliary dilatation.


Admitted for further treatment.





5/27: Lipase down. A1c 13.6, Triglycerides 3252. Started on insulin gtt.


5/28: Abdominal pain and glucose improved.  Having ice chips per GI.  Afebrile. 

No CP or SOB. D/w [facility] rheumatology, to hold MTX therapy on 5/29 given her 

leukopenia and pancreatitis.


5/29: Overnight afebrile.  Labs improved. Still with left-sided abdominal pain. 

WBC 1.8.  She notes she had not previously had a formal diagnosis of diabetes.  

No CP or SOB


5/30: Pain improved, advancing diet per GI lipase 328.  WBC 2, K2.6, MG 1.4


5/31: Afebrile overnight.  Still with abdominal pain, no CP or SOB


6/1: Patient continues to have discomfort especially when she has a diet no 

fever or chills were reported nevertheless she says that she woke up drenched in

sweat in the middle of night, no other complaints were reported overnight, plan 

of care discussed in detail





Vitals


Vitals





Vital Signs








  Date Time  Temp Pulse Resp B/P (MAP) Pulse Ox O2 Delivery O2 Flow Rate FiO2


 


6/1/20 12:09      Room Air  


 


6/1/20 10:46 98.2 100 17 132/71 (91) 95   





 98.2       


 


6/1/20 03:22       2.0 











Physical Exam


General:  Alert, Oriented X3, Cooperative, moderate distress


Abdomen:  Normal bowel sounds, Soft, No hepatosplenomegaly, No masses, Other 

(Diffuse tenderness)


Extremities:  No clubbing, No cyanosis, No edema, Normal pulses, No 

tenderness/swelling


Skin:  No rashes, No breakdown, No significant lesion





Labs


LABS





Laboratory Tests








Test


 5/31/20


17:00 5/31/20


20:48 6/1/20


07:27 6/1/20


11:11


 


Glucose (Fingerstick)


 138 mg/dL


(70-99) 185 mg/dL


(70-99) 160 mg/dL


(70-99) 204 mg/dL


(70-99)











Review of Systems


Review of Systems


Pertinent as per HPI otherwise 14 point review of system is negative





Assessment and Plan


Assessmemt and Plan


Problems


Medical Problems:


(1) Dehydration


Status: Acute  





(2) Hyperglycemia


Status: Acute  





(3) Pancreatitis, acute


Status: Acute  











Comment


Review of Relevant


I have reviewed the following items elizabeth (where applicable) has been applied.


Labs





Laboratory Tests








Test


 5/30/20


16:48 5/30/20


20:03 5/31/20


07:54 5/31/20


08:05


 


Glucose (Fingerstick)


 156 mg/dL


(70-99) 162 mg/dL


(70-99) 154 mg/dL


(70-99) 





 


White Blood Count


 


 


 


 1.9 x10^3/uL


(4.0-11.0)


 


Red Blood Count


 


 


 


 4.05 x10^6/uL


(3.50-5.40)


 


Hemoglobin


 


 


 


 11.3 g/dL


(12.0-15.5)


 


Hematocrit


 


 


 


 34.5 %


(36.0-47.0)


 


Mean Corpuscular Volume    85 fL () 


 


Mean Corpuscular Hemoglobin    28 pg (25-35) 


 


Mean Corpuscular Hemoglobin


Concent 


 


 


 33 g/dL


(31-37)


 


Red Cell Distribution Width


 


 


 


 16.3 %


(11.5-14.5)


 


Platelet Count


 


 


 


 201 x10^3/uL


(140-400)


 


Neutrophils (%) (Auto)    14 % (31-73) 


 


Lymphocytes (%) (Auto)    37 % (24-48) 


 


Monocytes (%) (Auto)    44 % (0-9) 


 


Eosinophils (%) (Auto)    4 % (0-3) 


 


Basophils (%) (Auto)    0 % (0-3) 


 


Neutrophils # (Auto)


 


 


 


 0.3 x10^3/uL


(1.8-7.7)


 


Lymphocytes # (Auto)


 


 


 


 0.7 x10^3/uL


(1.0-4.8)


 


Monocytes # (Auto)


 


 


 


 0.8 x10^3/uL


(0.0-1.1)


 


Eosinophils # (Auto)


 


 


 


 0.1 x10^3/uL


(0.0-0.7)


 


Basophils # (Auto)


 


 


 


 0.0 x10^3/uL


(0.0-0.2)


 


Sodium Level


 


 


 


 136 mmol/L


(136-145)


 


Potassium Level


 


 


 


 3.1 mmol/L


(3.5-5.1)


 


Chloride Level


 


 


 


 102 mmol/L


()


 


Carbon Dioxide Level


 


 


 


 22 mmol/L


(21-32)


 


Anion Gap    12 (6-14) 


 


Blood Urea Nitrogen    5 mg/dL (7-20) 


 


Creatinine


 


 


 


 0.5 mg/dL


(0.6-1.0)


 


Estimated GFR


(Cockcroft-Gault) 


 


 


 126.7 





 


Glucose Level


 


 


 


 172 mg/dL


(70-99)


 


Calcium Level


 


 


 


 8.2 mg/dL


(8.5-10.1)


 


Magnesium Level


 


 


 


 1.8 mg/dL


(1.8-2.4)


 


Test


 5/31/20


11:51 5/31/20


17:00 5/31/20


20:48 6/1/20


07:27


 


Glucose (Fingerstick)


 231 mg/dL


(70-99) 138 mg/dL


(70-99) 185 mg/dL


(70-99) 160 mg/dL


(70-99)


 


Test


 6/1/20


11:11 


 


 





 


Glucose (Fingerstick)


 204 mg/dL


(70-99) 


 


 











Laboratory Tests








Test


 5/31/20


17:00 5/31/20


20:48 6/1/20


07:27 6/1/20


11:11


 


Glucose (Fingerstick)


 138 mg/dL


(70-99) 185 mg/dL


(70-99) 160 mg/dL


(70-99) 204 mg/dL


(70-99)








Medications





Current Medications


Iohexol (Omnipaque 300 Mg/ml) 75 ml 1X  ONCE IV  Last administered on 5/26/20at 

11:30;  Start 5/26/20 at 11:30;  Stop 5/26/20 at 11:31;  Status DC


Iohexol (Omnipaque 240 Mg/ml) 50 ml 1X  ONCE PO  Last administered on 5/26/20at 

11:30;  Start 5/26/20 at 11:30;  Stop 5/26/20 at 11:31;  Status DC


Info (CONTRAST GIVEN -- Rx MONITORING) 1 each PRN DAILY  PRN MC SEE COMMENTS;  

Start 5/26/20 at 11:30;  Stop 5/28/20 at 11:29;  Status DC


Sodium Chloride 1,000 ml @  0 mls/hr 1X  ONCE IV  Last administered on 5/26/20at

12:27;  Start 5/26/20 at 12:15;  Stop 5/26/20 at 12:16;  Status DC


Morphine Sulfate (Morphine Sulfate) 5 mg 1X  ONCE IV  Last administered on 

5/26/20at 12:45;  Start 5/26/20 at 12:30;  Stop 5/26/20 at 12:33;  Status DC


Morphine Sulfate (Morphine Sulfate) 5 mg 1X  ONCE IV ;  Start 5/26/20 at 13:45; 

Stop 5/26/20 at 13:46;  Status DC


Morphine Sulfate (Morphine Sulfate) 4 mg PRN Q2HR  PRN IV MODERATE TO SEVERE 

PAIN Last administered on 5/28/20at 16:18;  Start 5/26/20 at 14:45;  Stop 6/1/20

at 10:23;  Status DC


Sodium Chloride 1,000 ml @  100 mls/hr Q10H IV  Last administered on 6/1/20at 

08:27;  Start 5/26/20 at 14:44;  Stop 6/1/20 at 10:23;  Status DC


Ondansetron HCl (Zofran) 4 mg PRN Q4HRS  PRN IV NAUSEA/VOMITING Last 

administered on 5/31/20at 12:03;  Start 5/26/20 at 14:45


Acetaminophen (Tylenol Supp) 650 mg PRN Q4HRS  PRN NJ TEMP OVER 100.4F OR MILD 

PAIN;  Start 5/26/20 at 14:45


Enoxaparin Sodium (Lovenox 40mg Syringe) 40 mg Q24H SQ  Last administered on 

5/31/20at 18:13;  Start 5/26/20 at 15:00


Insulin Human Lispro (HumaLOG) 0-9 UNITS Q4HRS SQ  Last administered on 

5/30/20at 18:15;  Start 5/26/20 at 16:00;  Stop 5/30/20 at 19:32;  Status DC


Dextrose (Dextrose 50%-Water Syringe) 12.5 gm PRN Q15MIN  PRN IV SEE COMMENTS;  

Start 5/26/20 at 14:45


Albuterol Sulfate (Ventolin Neb Soln) 3 mg PRN QID  PRN NEB SHORTNESS OF BREATH;

 Start 5/26/20 at 15:15


Sodium Chloride (Saline Mist Nasal) 1 néstor PRN Q1HR  PRN NS NASAL CONGESTION;  

Start 5/26/20 at 15:15


Methylprednisolone Sodium Succinate (SOLU-Medrol 40MG VIAL) 40 mg DAILY IV  Last

administered on 5/28/20at 08:21;  Start 5/26/20 at 15:15;  Stop 5/28/20 at 

13:44;  Status DC


Insulin Human Regular 100 unit/ Sodium Chloride 101 ml @ 0 mls/hr CONT  PRN IV S

EE I/O RECORD Last administered on 5/27/20at 02:12;  Start 5/26/20 at 18:00


Fentanyl Citrate (Fentanyl 2ml Vial) 50 mcg PRN Q2HR  PRN IVP MOD TO SEVERE 

PAIN, 2ND CHOICE Last administered on 5/30/20at 09:00;  Start 5/27/20 at 11:45


Famotidine (Pepcid Vial) 20 mg QHS IVP  Last administered on 5/31/20at 22:14;  

Start 5/28/20 at 21:00;  Stop 6/1/20 at 10:21;  Status DC


Prednisone (Prednisone) 3 mg DAILY PO  Last administered on 6/1/20at 08:27;  

Start 5/29/20 at 09:00


Potassium Chloride/Water 100 ml @  100 mls/hr Q1H IV  Last administered on 

5/30/20at 15:22;  Start 5/30/20 at 11:00;  Stop 5/30/20 at 14:59;  Status DC


Potassium Chloride (Klor-Con) 40 meq 1X  ONCE PO  Last administered on 5/30/20at

11:01;  Start 5/30/20 at 10:15;  Stop 5/30/20 at 10:22;  Status DC


Tramadol HCl (Ultram) 50 mg PRN Q6HRS  PRN PO MODERATE PAIN 4-6 Last 

administered on 6/1/20at 07:17;  Start 5/30/20 at 10:15


Oxycodone HCl (Roxicodone) 5 mg PRN Q6HRS  PRN PO SEVERE PAIN 7-10 Last 

administered on 5/30/20at 11:08;  Start 5/30/20 at 10:15;  Stop 5/30/20 at 

15:30;  Status DC


Magnesium Sulfate 100 ml @  25 mls/hr 1X  ONCE IV  Last administered on 

5/30/20at 11:06;  Start 5/30/20 at 11:00;  Stop 5/30/20 at 14:59;  Status DC


Magnesium Hydroxide (Milk Of Magnesia) 2,400 mg PRN DAILY  PRN PO CONSTIPATION 

2ND CHOICE;  Start 5/30/20 at 15:30


Psyllium Hydrophilic Mucilloid (Metamucil Fiber Packet) 1 pkt DAILY PO  Last 

administered on 6/1/20at 08:28;  Start 5/30/20 at 15:30


Polyethylene Glycol (miraLAX PACKET) 17 gm DAILY PO  Last administered on 

6/1/20at 08:28;  Start 5/30/20 at 15:30


Oxycodone HCl (Roxicodone) 5 mg PRN Q4HRS  PRN PO SEVERE PAIN 7-10 Last 

administered on 6/1/20at 10:56;  Start 5/30/20 at 15:30


Insulin Human Lispro (HumaLOG) 0-9 UNITS TIDWMEALHC SQ  Last administered on 

6/1/20at 08:34;  Start 5/30/20 at 19:30


Famotidine (Pepcid) 20 mg QHS PO ;  Start 6/1/20 at 21:00


Iohexol (Omnipaque 300 Mg/ml) 75 ml 1X  ONCE IV  Last administered on 6/1/20at 

11:45;  Start 6/1/20 at 11:45;  Stop 6/1/20 at 11:46;  Status DC


Info (CONTRAST GIVEN -- Rx MONITORING) 1 each PRN DAILY  PRN MC SEE COMMENTS;  

Start 6/1/20 at 11:45;  Stop 6/3/20 at 11:44





Active Scripts


Active


Reported


Zyrtec (Cetirizine Hcl) 10 Mg Capsule 10 Mg PO DAILY


Methotrexate (Methotrexate Sodium) 2.5 Mg Tablet 8 Tab PO WEEKLY


Protonix  ** (Pantoprazole Sodium) 40 Mg Tablet.dr 40 Mg PO DAILYAC


Folic Acid 0.8 Mg Capsule 1 Cap PO DAILY 30 Days


Ferrous Sulfate 325 Mg Tablet 1 Tab PO DAILY


Vitals/I & O





Vital Sign - Last 24 Hours








 5/31/20 5/31/20 5/31/20 5/31/20





 18:13 19:00 19:13 20:10


 


Temp  98.7  





  98.7  


 


Pulse  96  


 


Resp  18 20 


 


B/P (MAP)  116/65 (82)  


 


Pulse Ox  94  


 


O2 Delivery Room Air Room Air Room Air Room Air


 


    





    





 5/31/20 5/31/20 5/31/20 6/1/20





 22:14 23:00 23:14 02:22


 


Temp  98.3  





  98.3  


 


Pulse  99  


 


Resp 20 18 20 20


 


B/P (MAP)  133/69 (90)  


 


Pulse Ox  95  


 


O2 Delivery Room Air Room Air Nasal Cannula Room Air


 


O2 Flow Rate   2.0 


 


    





    





 6/1/20 6/1/20 6/1/20 6/1/20





 03:00 03:22 07:00 07:15


 


Temp 98.4  98.0 





 98.4  98.0 


 


Pulse 95  91 


 


Resp 18 20 17 


 


B/P (MAP) 107/55 (72)  117/60 (79) 


 


Pulse Ox 91  94 


 


O2 Delivery Room Air Nasal Cannula Room Air Room Air


 


O2 Flow Rate  2.0  


 


    





    





 6/1/20 6/1/20 6/1/20 6/1/20





 07:17 08:20 10:46 10:56


 


Temp   98.2 





   98.2 


 


Pulse   100 


 


Resp   17 


 


B/P (MAP)   132/71 (91) 


 


Pulse Ox   95 


 


O2 Delivery Room Air Room Air Room Air Room Air


 


    





    





 6/1/20   





 12:09   


 


O2 Delivery Room Air   














Intake and Output   


 


 5/31/20 5/31/20 6/1/20





 15:00 23:00 07:00


 


Intake Total  240 ml 2400 ml


 


Balance  240 ml 2400 ml











Hemodynamically unstable?:  No


Is patient in severe pain?:  No


Is NPO status required?:  No











ERWIN DUNLAP MD              Jun 1, 2020 15:11

## 2020-06-01 NOTE — RAD
CT ABD PELV W/ IV CONTRST ONLY

 

Indication: Reason: pancreatitis, ongoing upper abd pain / Spl. 

Instructions: IV OMNI 300 75 MLS / History: 

 

Exposure: One or more of the following individualized dose reduction 

techniques were utilized for this examination:  1. Automated exposure 

control  2. Adjustment of the mA and/or kV according to patient size  3. 

Use of iterative reconstruction technique.

 

Technique: Intravenous contrast was given. No oral contrast per request.

 

Comparison: 5/26/2020

 

FINDINGS:

Small pleural effusions are noted in the lung bases. 

Atelectasis/infiltrate in both lung bases.

 

Tiny hypodense lesion at the inferior right lobe of the liver, stable 

since prior study but too small to characterize, may just represent a 

small cyst, about 5 mm diameter. Spleen is nonenlarged.

 

Pancreatic swelling with ill-defined margins is again seen, slightly 

greater than on the prior study. Pancreatic body measures about 3.5 cm 

with today, compared with 2.7 cm on prior exam. Slight heterogeneity of 

pancreatic enhancement pattern, particularly in the region of the 

pancreatic body. There is also increase in stranding of the peripancreatic

fat and of disorganized peripancreatic fluid. There has been substantial 

increase in fluid in the left paracolic gutter and lateral to the left 

pararenal fascia. This measures 15 Hounsfield units compatible with simple

nature. This extends distally into the upper left pelvis, lateral to the 

left psoas muscle and posterior to the descending colon. There is also 

increase in fatty stranding in this area since the prior exam. There is 

been an increase in swelling and fatty stranding within the subcutaneous 

tissues of the left flank.

 

No evidence of adrenal mass. Kidneys demonstrate symmetric enhancement 

without dominant mass or hydronephrosis. Mild stranding in the perinephric

fat bilaterally, slightly greater than on the prior exam. Tiny 

nonobstructive calculus in the lower pole the left kidney is again seen.

 

Gallbladder is surgically absent. Small hiatal hernia. Wall thickening of 

the gastric antrum through proximal duodenum, may just be reactive and due

to nondistention. No significant small bowel distention. Mild wall 

thickening of the posterior wall of the descending colon, where it is in 

contact with the fluid and edema in the region of the lateral conal 

fascia, probably reactive in nature. The appendix appears within normal 

limits.

 

Tiny fatty density within the distal duodenum is again seen, likely a 

lipoma. Small fatty lesion within the proximal jejunum is also again 

identified, also likely a lipoma. These were seen previously.

 

Mild free pelvic fluid. No evidence of pneumoperitoneum. Urinary bladder 

appears unremarkable.

 

Vertebral body height and alignment are intact. No evidence of aggressive 

bone destruction.

 

The aorta is nonaneurysmal. Renal arteries, superior mesenteric artery and

celiac axis appear patent. Note there is narrowing at the origin of the 

celiac axis but distal flow is identified. Portal vein and splenic vein 

appear grossly patent.

 

IMPRESSION:

1. Progression of findings of acute pancreatitis. Pancreatic enhancement 

pattern is slightly heterogeneous but no definable nonenhancing component 

to suggest liquefactive necrosis at this time. In addition, there is been 

substantial increase in left lateral peripancreatic fluid which extends 

laterally to the left in the region of the lateral conal and renal fascia,

and dissects distally into the left pelvis. This could be infected fluid, 

but does not appear to be an encapsulated collection.

2. Small pleural effusions at both lung bases with bibasilar atelectasis 

and/or infiltrate.

3. Mild wall thickening of the posterior descending colon, most likely 

secondary or reactive given its proximity to the fluid/edema

4. Mild wall thickening of the distal stomach and duodenum, also likely 

reactive and due to nondistention.

5. Narrowing or stenosis at the origin of the celiac axis, but note there 

is evidence of distal enhancement.

 

Electronically signed by: Pal Cheung MD (6/1/2020 4:08 PM) [id number]

## 2020-06-01 NOTE — NUR
SW following. Discussed with RN, pt from home, has gone back to clears for CT of abdomen. Pt 
gets up ad tayo. RN advised no SW needs at this time. SW will continue to follow.

## 2020-06-01 NOTE — PDOC
Subjective:


Subjective:


Still has a lot of pain - epigastric/LUQ.  Eating makes it worse.


Passing gas.





Objective:


Vital Signs:





                                   Vital Signs








  Date Time  Temp Pulse Resp B/P (MAP) Pulse Ox O2 Delivery O2 Flow Rate FiO2


 


6/1/20 08:20      Room Air  


 


6/1/20 07:00 98.0 91 17 117/60 (79) 94   





 98.0       


 


6/1/20 03:22       2.0 








Labs:





Laboratory Tests








Test


 5/31/20


11:51 5/31/20


17:00 5/31/20


20:48 6/1/20


07:27


 


Glucose (Fingerstick) 231 mg/dL  138 mg/dL  185 mg/dL  160 mg/dL 











PE:





GEN: NAD


LUNGS: CTAB


HEART: RRR


ABD: quiet BS, soft, epigastric/LUQ tenderness


NEURO/PSYCH: A & O 3





A/P:


Pancreatitis, hypertriglyceridemia


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


Leukopenia, hypokalemia, new DM, h/o celiac





--


Ongoing pain complaints - recheck CT.





Hemodynamically unstable?:  No


Is patient in severe pain?:  No


Is NPO status required?:  No











AILYN OWEN          Jun 1, 2020 10:20

## 2020-06-02 VITALS — DIASTOLIC BLOOD PRESSURE: 73 MMHG | SYSTOLIC BLOOD PRESSURE: 142 MMHG

## 2020-06-02 VITALS — SYSTOLIC BLOOD PRESSURE: 110 MMHG | DIASTOLIC BLOOD PRESSURE: 60 MMHG

## 2020-06-02 VITALS — DIASTOLIC BLOOD PRESSURE: 72 MMHG | SYSTOLIC BLOOD PRESSURE: 148 MMHG

## 2020-06-02 VITALS — SYSTOLIC BLOOD PRESSURE: 133 MMHG | DIASTOLIC BLOOD PRESSURE: 71 MMHG

## 2020-06-02 VITALS — DIASTOLIC BLOOD PRESSURE: 82 MMHG | SYSTOLIC BLOOD PRESSURE: 128 MMHG

## 2020-06-02 VITALS — DIASTOLIC BLOOD PRESSURE: 72 MMHG | SYSTOLIC BLOOD PRESSURE: 140 MMHG

## 2020-06-02 LAB
ALBUMIN SERPL-MCNC: 2.2 G/DL (ref 3.4–5)
ALBUMIN/GLOB SERPL: 0.6 {RATIO} (ref 1–1.7)
ALP SERPL-CCNC: 84 U/L (ref 46–116)
ALT SERPL-CCNC: 20 U/L (ref 14–59)
ANION GAP SERPL CALC-SCNC: 10 MMOL/L (ref 6–14)
AST SERPL-CCNC: 14 U/L (ref 15–37)
BASOPHILS # BLD AUTO: 0 X10^3/UL (ref 0–0.2)
BASOPHILS NFR BLD: 0 % (ref 0–3)
BILIRUB SERPL-MCNC: 0.6 MG/DL (ref 0.2–1)
BUN SERPL-MCNC: 2 MG/DL (ref 7–20)
BUN/CREAT SERPL: 3 (ref 6–20)
CALCIUM SERPL-MCNC: 8.5 MG/DL (ref 8.5–10.1)
CHLORIDE SERPL-SCNC: 99 MMOL/L (ref 98–107)
CO2 SERPL-SCNC: 31 MMOL/L (ref 21–32)
CREAT SERPL-MCNC: 0.6 MG/DL (ref 0.6–1)
EOSINOPHIL NFR BLD: 0.1 X10^3/UL (ref 0–0.7)
EOSINOPHIL NFR BLD: 4 % (ref 0–3)
ERYTHROCYTE [DISTWIDTH] IN BLOOD BY AUTOMATED COUNT: 15.7 % (ref 11.5–14.5)
GFR SERPLBLD BASED ON 1.73 SQ M-ARVRAT: 102.7 ML/MIN
GLOBULIN SER-MCNC: 4 G/DL (ref 2.2–3.8)
GLUCOSE SERPL-MCNC: 223 MG/DL (ref 70–99)
HCT VFR BLD CALC: 33.2 % (ref 36–47)
HGB BLD-MCNC: 11.1 G/DL (ref 12–15.5)
LYMPHOCYTES # BLD: 0.4 X10^3/UL (ref 1–4.8)
LYMPHOCYTES NFR BLD AUTO: 25 % (ref 24–48)
MCH RBC QN AUTO: 28 PG (ref 25–35)
MCHC RBC AUTO-ENTMCNC: 33 G/DL (ref 31–37)
MCV RBC AUTO: 84 FL (ref 79–100)
MONO #: 0.5 X10^3/UL (ref 0–1.1)
MONOCYTES NFR BLD: 33 % (ref 0–9)
NEUT #: 0.6 X10^3/UL (ref 1.8–7.7)
NEUTROPHILS NFR BLD AUTO: 38 % (ref 31–73)
PLATELET # BLD AUTO: 208 X10^3/UL (ref 140–400)
POTASSIUM SERPL-SCNC: 2.5 MMOL/L (ref 3.5–5.1)
PROT SERPL-MCNC: 6.2 G/DL (ref 6.4–8.2)
RBC # BLD AUTO: 3.96 X10^6/UL (ref 3.5–5.4)
SODIUM SERPL-SCNC: 140 MMOL/L (ref 136–145)
WBC # BLD AUTO: 1.6 X10^3/UL (ref 4–11)

## 2020-06-02 RX ADMIN — POTASSIUM CHLORIDE SCH MLS/HR: 7.46 INJECTION, SOLUTION INTRAVENOUS at 16:01

## 2020-06-02 RX ADMIN — INSULIN LISPRO SCH UNITS: 100 INJECTION, SOLUTION INTRAVENOUS; SUBCUTANEOUS at 21:00

## 2020-06-02 RX ADMIN — INSULIN LISPRO SCH UNITS: 100 INJECTION, SOLUTION INTRAVENOUS; SUBCUTANEOUS at 08:00

## 2020-06-02 RX ADMIN — FAMOTIDINE SCH MG: 10 INJECTION, SOLUTION INTRAVENOUS at 21:28

## 2020-06-02 RX ADMIN — SODIUM CHLORIDE, SODIUM LACTATE, POTASSIUM CHLORIDE, AND CALCIUM CHLORIDE SCH MLS/HR: .6; .31; .03; .02 INJECTION, SOLUTION INTRAVENOUS at 09:42

## 2020-06-02 RX ADMIN — FENTANYL CITRATE PRN MCG: 50 INJECTION INTRAMUSCULAR; INTRAVENOUS at 16:08

## 2020-06-02 RX ADMIN — ONDANSETRON PRN MG: 2 INJECTION INTRAMUSCULAR; INTRAVENOUS at 14:06

## 2020-06-02 RX ADMIN — POTASSIUM CHLORIDE SCH MLS/HR: 7.46 INJECTION, SOLUTION INTRAVENOUS at 10:47

## 2020-06-02 RX ADMIN — FENTANYL CITRATE PRN MCG: 50 INJECTION INTRAMUSCULAR; INTRAVENOUS at 19:01

## 2020-06-02 RX ADMIN — INSULIN LISPRO SCH UNITS: 100 INJECTION, SOLUTION INTRAVENOUS; SUBCUTANEOUS at 16:39

## 2020-06-02 RX ADMIN — POTASSIUM CHLORIDE SCH MLS/HR: 7.46 INJECTION, SOLUTION INTRAVENOUS at 13:59

## 2020-06-02 RX ADMIN — POTASSIUM CHLORIDE SCH MLS/HR: 7.46 INJECTION, SOLUTION INTRAVENOUS at 15:01

## 2020-06-02 RX ADMIN — FENTANYL CITRATE PRN MCG: 50 INJECTION INTRAMUSCULAR; INTRAVENOUS at 11:59

## 2020-06-02 RX ADMIN — INSULIN LISPRO SCH UNITS: 100 INJECTION, SOLUTION INTRAVENOUS; SUBCUTANEOUS at 11:51

## 2020-06-02 RX ADMIN — POTASSIUM CHLORIDE SCH MLS/HR: 7.46 INJECTION, SOLUTION INTRAVENOUS at 20:17

## 2020-06-02 RX ADMIN — POTASSIUM CHLORIDE SCH MLS/HR: 7.46 INJECTION, SOLUTION INTRAVENOUS at 09:55

## 2020-06-02 RX ADMIN — POTASSIUM CHLORIDE SCH MLS/HR: 7.46 INJECTION, SOLUTION INTRAVENOUS at 16:59

## 2020-06-02 RX ADMIN — POTASSIUM CHLORIDE SCH MLS/HR: 7.46 INJECTION, SOLUTION INTRAVENOUS at 11:53

## 2020-06-02 RX ADMIN — POTASSIUM CHLORIDE SCH MLS/HR: 7.46 INJECTION, SOLUTION INTRAVENOUS at 21:27

## 2020-06-02 RX ADMIN — FENTANYL CITRATE PRN MCG: 50 INJECTION INTRAMUSCULAR; INTRAVENOUS at 21:28

## 2020-06-02 RX ADMIN — POTASSIUM CHLORIDE SCH MLS/HR: 7.46 INJECTION, SOLUTION INTRAVENOUS at 12:57

## 2020-06-02 RX ADMIN — PSYLLIUM HUSK SCH PKT: 3.4 POWDER ORAL at 08:40

## 2020-06-02 RX ADMIN — POTASSIUM CHLORIDE SCH MLS/HR: 7.46 INJECTION, SOLUTION INTRAVENOUS at 17:55

## 2020-06-02 RX ADMIN — POLYETHYLENE GLYCOL 3350 SCH GM: 17 POWDER, FOR SOLUTION ORAL at 08:40

## 2020-06-02 RX ADMIN — POTASSIUM CHLORIDE SCH MLS/HR: 7.46 INJECTION, SOLUTION INTRAVENOUS at 19:01

## 2020-06-02 RX ADMIN — ENOXAPARIN SODIUM SCH MG: 40 INJECTION SUBCUTANEOUS at 15:02

## 2020-06-02 NOTE — PDOC
PROGRESS NOTES


Chief Complaint


Chief Complaint


Acute pancreatitis - likely 2/2 hyper-triglyceridemia.  IgG4 RD less likely 

given level of 72 mg/DL and chronic immunosuppression on methotrexate and 

prednisone.  I discussed with [facility] rheumatology concern that MTX could play a 

role as well.  Hold dosing for leukopenia


Duodenal and jejunal intraluminal lipomas


Hypertriglyceridemia - will need outpatient Endocrinology referral for 

cholesterol metabolism treatment, I have suggested prescription 2g TID fish oil 

(Vascepa or Lovaza) in the meantime once pancreatitis resolves


Hyponatremia


DM2 with Hyperglycemia - A1c 13.6. Should continue on insulin therapy until A1c 

< 8, then consider PO options. Would probably have to avoid GLP1 therapy given 

her pancreatitis.  Would defer to an endocrinologist for this.


Celiac disease 


Adult onset stills disease


Anemia


Asthma


NATO on CPAP


GERD 


Leukopenia -likely related to chronic immunosuppression will hold MTX therapy 

for now and change to a prednisone taper on discharge.





FEN - LR for maintenance, NPO 


PPX - lovenox


FULL CODE


DIspo - inpatient for pancreatitis recovery





History of Present Illness


History of Present Illness


Ms Almanza is a 58yo F [employer]  w/ PMHx celiac disease, adult onset stills 

disease, Anemia, Asthma, NATO on CPAP, GERD who presents with severe upper 

abdominal pain, nausea, vomiting, fatigue. She states this started suddenly this

morning while she was working from home on teleconference (works as a 

on the  base). She only had oatmeal for breakfast.


She has never had anything similar to this in the past.  She generally feels 

unwell.  She is not had any fever, diarrhea, chest pain, shortness of breath, 

dysuria, hematuria, blood in her stools.  She denies alcohol abuse.  Pain does 

not move anywhere.  Nothing seems to make it better or worse.  She has not tried

anything at home.  He does not drink alcohol, and is status post 

cholecystectomy.  No calcium disorders.


She takes 20mg methotrexate weekly on Fridays and is on 3 mg of chronic 

prednisone for her stills disease.  She was diagnosed with celiac disease 

January 2020 and has just recently started on a gluten-free diet.  No recent 

sick contacts that she can recall.


Labs significant for lipase 10034, glucose 477, , bilirubin 1.1, Calcium 

8.3, K 3.7, WBC 6.3, Hb 15, Platelets 247


CT abdomen pelvis shows surgically absent gallbladder and surgically absent 

uterus and describes duodenal and jejunal intraluminal lipomas. Findings of 

acute pancreatitis. No loculated collections. No biliary dilatation.


Admitted for further treatment.





5/27: Lipase down. A1c 13.6, Triglycerides 3252. Started on insulin gtt.


5/28: Abdominal pain and glucose improved.  Having ice chips per GI.  Afebrile. 

No CP or SOB. D/w [facility] rheumatology, to hold MTX therapy on 5/29 given her 

leukopenia and pancreatitis.


5/29: Overnight afebrile.  Labs improved. Still with left-sided abdominal pain. 

WBC 1.8.  She notes she had not previously had a formal diagnosis of diabetes.  

No CP or SOB


5/30: Pain improved, advancing diet per GI lipase 328.  WBC 2, K2.6, MG 1.4


5/31: Afebrile overnight.  Still with abdominal pain, no CP or SOB


6/1: Patient continues to have discomfort especially when she has a diet no 

fever or chills were reported nevertheless she says that she woke up drenched in

sweat in the middle of night, no other complaints were reported overnight, plan 

of care discussed in detail


6/2: Patient will continue to have bowel rest in light of her CT from yesterday.

No new complaints.





Vitals


Vitals





Vital Signs








  Date Time  Temp Pulse Resp B/P (MAP) Pulse Ox O2 Delivery O2 Flow Rate FiO2


 


6/2/20 12:57      Room Air  


 


6/2/20 10:52 98.0 94 17 142/73 (96) 92   





 98.0       


 


6/2/20 06:08       2.0 











Physical Exam


General:  Alert, Oriented X3, Cooperative, moderate distress


Abdomen:  Normal bowel sounds, Soft, No hepatosplenomegaly, No masses, Other 

(Diffuse tenderness)


Extremities:  No clubbing, No cyanosis, No edema, Normal pulses, No tende

rness/swelling


Skin:  No rashes, No breakdown, No significant lesion





Labs


LABS





Laboratory Tests








Test


 6/1/20


16:17 6/1/20


21:11 6/2/20


06:55 6/2/20


08:50


 


Glucose (Fingerstick)


 183 mg/dL


(70-99) 126 mg/dL


(70-99) 152 mg/dL


(70-99) 





 


White Blood Count


 


 


 


 1.6 x10^3/uL


(4.0-11.0)


 


Red Blood Count


 


 


 


 3.96 x10^6/uL


(3.50-5.40)


 


Hemoglobin


 


 


 


 11.1 g/dL


(12.0-15.5)


 


Hematocrit


 


 


 


 33.2 %


(36.0-47.0)


 


Mean Corpuscular Volume    84 fL () 


 


Mean Corpuscular Hemoglobin    28 pg (25-35) 


 


Mean Corpuscular Hemoglobin


Concent 


 


 


 33 g/dL


(31-37)


 


Red Cell Distribution Width


 


 


 


 15.7 %


(11.5-14.5)


 


Platelet Count


 


 


 


 208 x10^3/uL


(140-400)


 


Neutrophils (%) (Auto)    38 % (31-73) 


 


Lymphocytes (%) (Auto)    25 % (24-48) 


 


Monocytes (%) (Auto)    33 % (0-9) 


 


Eosinophils (%) (Auto)    4 % (0-3) 


 


Basophils (%) (Auto)    0 % (0-3) 


 


Neutrophils # (Auto)


 


 


 


 0.6 x10^3/uL


(1.8-7.7)


 


Lymphocytes # (Auto)


 


 


 


 0.4 x10^3/uL


(1.0-4.8)


 


Monocytes # (Auto)


 


 


 


 0.5 x10^3/uL


(0.0-1.1)


 


Eosinophils # (Auto)


 


 


 


 0.1 x10^3/uL


(0.0-0.7)


 


Basophils # (Auto)


 


 


 


 0.0 x10^3/uL


(0.0-0.2)


 


Sodium Level


 


 


 


 140 mmol/L


(136-145)


 


Potassium Level


 


 


 


 2.5 mmol/L


(3.5-5.1)


 


Chloride Level


 


 


 


 99 mmol/L


()


 


Carbon Dioxide Level


 


 


 


 31 mmol/L


(21-32)


 


Anion Gap    10 (6-14) 


 


Blood Urea Nitrogen    2 mg/dL (7-20) 


 


Creatinine


 


 


 


 0.6 mg/dL


(0.6-1.0)


 


Estimated GFR


(Cockcroft-Gault) 


 


 


 102.7 





 


BUN/Creatinine Ratio    3 (6-20) 


 


Glucose Level


 


 


 


 223 mg/dL


(70-99)


 


Calcium Level


 


 


 


 8.5 mg/dL


(8.5-10.1)


 


Total Bilirubin


 


 


 


 0.6 mg/dL


(0.2-1.0)


 


Aspartate Amino Transf


(AST/SGOT) 


 


 


 14 U/L (15-37) 





 


Alanine Aminotransferase


(ALT/SGPT) 


 


 


 20 U/L (14-59) 





 


Alkaline Phosphatase


 


 


 


 84 U/L


()


 


Total Protein


 


 


 


 6.2 g/dL


(6.4-8.2)


 


Albumin


 


 


 


 2.2 g/dL


(3.4-5.0)


 


Albumin/Globulin Ratio    0.6 (1.0-1.7) 


 


Lipase


 


 


 


 133 U/L


()


 


Test


 6/2/20


11:15 


 


 





 


Glucose (Fingerstick)


 192 mg/dL


(70-99) 


 


 














Assessment and Plan


Assessmemt and Plan


Problems


Medical Problems:


(1) Dehydration


Status: Acute  





(2) Hyperglycemia


Status: Acute  





(3) Pancreatitis, acute


Status: Acute  











Comment


Review of Relevant


I have reviewed the following items elizabeth (where applicable) has been applied.


Labs





Laboratory Tests








Test


 5/31/20


17:00 5/31/20


20:48 6/1/20


07:27 6/1/20


11:11


 


Glucose (Fingerstick)


 138 mg/dL


(70-99) 185 mg/dL


(70-99) 160 mg/dL


(70-99) 204 mg/dL


(70-99)


 


Test


 6/1/20


16:17 6/1/20


21:11 6/2/20


06:55 6/2/20


08:50


 


Glucose (Fingerstick)


 183 mg/dL


(70-99) 126 mg/dL


(70-99) 152 mg/dL


(70-99) 





 


White Blood Count


 


 


 


 1.6 x10^3/uL


(4.0-11.0)


 


Red Blood Count


 


 


 


 3.96 x10^6/uL


(3.50-5.40)


 


Hemoglobin


 


 


 


 11.1 g/dL


(12.0-15.5)


 


Hematocrit


 


 


 


 33.2 %


(36.0-47.0)


 


Mean Corpuscular Volume    84 fL () 


 


Mean Corpuscular Hemoglobin    28 pg (25-35) 


 


Mean Corpuscular Hemoglobin


Concent 


 


 


 33 g/dL


(31-37)


 


Red Cell Distribution Width


 


 


 


 15.7 %


(11.5-14.5)


 


Platelet Count


 


 


 


 208 x10^3/uL


(140-400)


 


Neutrophils (%) (Auto)    38 % (31-73) 


 


Lymphocytes (%) (Auto)    25 % (24-48) 


 


Monocytes (%) (Auto)    33 % (0-9) 


 


Eosinophils (%) (Auto)    4 % (0-3) 


 


Basophils (%) (Auto)    0 % (0-3) 


 


Neutrophils # (Auto)


 


 


 


 0.6 x10^3/uL


(1.8-7.7)


 


Lymphocytes # (Auto)


 


 


 


 0.4 x10^3/uL


(1.0-4.8)


 


Monocytes # (Auto)


 


 


 


 0.5 x10^3/uL


(0.0-1.1)


 


Eosinophils # (Auto)


 


 


 


 0.1 x10^3/uL


(0.0-0.7)


 


Basophils # (Auto)


 


 


 


 0.0 x10^3/uL


(0.0-0.2)


 


Sodium Level


 


 


 


 140 mmol/L


(136-145)


 


Potassium Level


 


 


 


 2.5 mmol/L


(3.5-5.1)


 


Chloride Level


 


 


 


 99 mmol/L


()


 


Carbon Dioxide Level


 


 


 


 31 mmol/L


(21-32)


 


Anion Gap    10 (6-14) 


 


Blood Urea Nitrogen    2 mg/dL (7-20) 


 


Creatinine


 


 


 


 0.6 mg/dL


(0.6-1.0)


 


Estimated GFR


(Cockcroft-Gault) 


 


 


 102.7 





 


BUN/Creatinine Ratio    3 (6-20) 


 


Glucose Level


 


 


 


 223 mg/dL


(70-99)


 


Calcium Level


 


 


 


 8.5 mg/dL


(8.5-10.1)


 


Total Bilirubin


 


 


 


 0.6 mg/dL


(0.2-1.0)


 


Aspartate Amino Transf


(AST/SGOT) 


 


 


 14 U/L (15-37) 





 


Alanine Aminotransferase


(ALT/SGPT) 


 


 


 20 U/L (14-59) 





 


Alkaline Phosphatase


 


 


 


 84 U/L


()


 


Total Protein


 


 


 


 6.2 g/dL


(6.4-8.2)


 


Albumin


 


 


 


 2.2 g/dL


(3.4-5.0)


 


Albumin/Globulin Ratio    0.6 (1.0-1.7) 


 


Lipase


 


 


 


 133 U/L


()


 


Test


 6/2/20


11:15 


 


 





 


Glucose (Fingerstick)


 192 mg/dL


(70-99) 


 


 











Laboratory Tests








Test


 6/1/20


16:17 6/1/20


21:11 6/2/20


06:55 6/2/20


08:50


 


Glucose (Fingerstick)


 183 mg/dL


(70-99) 126 mg/dL


(70-99) 152 mg/dL


(70-99) 





 


White Blood Count


 


 


 


 1.6 x10^3/uL


(4.0-11.0)


 


Red Blood Count


 


 


 


 3.96 x10^6/uL


(3.50-5.40)


 


Hemoglobin


 


 


 


 11.1 g/dL


(12.0-15.5)


 


Hematocrit


 


 


 


 33.2 %


(36.0-47.0)


 


Mean Corpuscular Volume    84 fL () 


 


Mean Corpuscular Hemoglobin    28 pg (25-35) 


 


Mean Corpuscular Hemoglobin


Concent 


 


 


 33 g/dL


(31-37)


 


Red Cell Distribution Width


 


 


 


 15.7 %


(11.5-14.5)


 


Platelet Count


 


 


 


 208 x10^3/uL


(140-400)


 


Neutrophils (%) (Auto)    38 % (31-73) 


 


Lymphocytes (%) (Auto)    25 % (24-48) 


 


Monocytes (%) (Auto)    33 % (0-9) 


 


Eosinophils (%) (Auto)    4 % (0-3) 


 


Basophils (%) (Auto)    0 % (0-3) 


 


Neutrophils # (Auto)


 


 


 


 0.6 x10^3/uL


(1.8-7.7)


 


Lymphocytes # (Auto)


 


 


 


 0.4 x10^3/uL


(1.0-4.8)


 


Monocytes # (Auto)


 


 


 


 0.5 x10^3/uL


(0.0-1.1)


 


Eosinophils # (Auto)


 


 


 


 0.1 x10^3/uL


(0.0-0.7)


 


Basophils # (Auto)


 


 


 


 0.0 x10^3/uL


(0.0-0.2)


 


Sodium Level


 


 


 


 140 mmol/L


(136-145)


 


Potassium Level


 


 


 


 2.5 mmol/L


(3.5-5.1)


 


Chloride Level


 


 


 


 99 mmol/L


()


 


Carbon Dioxide Level


 


 


 


 31 mmol/L


(21-32)


 


Anion Gap    10 (6-14) 


 


Blood Urea Nitrogen    2 mg/dL (7-20) 


 


Creatinine


 


 


 


 0.6 mg/dL


(0.6-1.0)


 


Estimated GFR


(Cockcroft-Gault) 


 


 


 102.7 





 


BUN/Creatinine Ratio    3 (6-20) 


 


Glucose Level


 


 


 


 223 mg/dL


(70-99)


 


Calcium Level


 


 


 


 8.5 mg/dL


(8.5-10.1)


 


Total Bilirubin


 


 


 


 0.6 mg/dL


(0.2-1.0)


 


Aspartate Amino Transf


(AST/SGOT) 


 


 


 14 U/L (15-37) 





 


Alanine Aminotransferase


(ALT/SGPT) 


 


 


 20 U/L (14-59) 





 


Alkaline Phosphatase


 


 


 


 84 U/L


()


 


Total Protein


 


 


 


 6.2 g/dL


(6.4-8.2)


 


Albumin


 


 


 


 2.2 g/dL


(3.4-5.0)


 


Albumin/Globulin Ratio    0.6 (1.0-1.7) 


 


Lipase


 


 


 


 133 U/L


()


 


Test


 6/2/20


11:15 


 


 





 


Glucose (Fingerstick)


 192 mg/dL


(70-99) 


 


 











Medications





Current Medications


Iohexol (Omnipaque 300 Mg/ml) 75 ml 1X  ONCE IV  Last administered on 5/26/20at 

11:30;  Start 5/26/20 at 11:30;  Stop 5/26/20 at 11:31;  Status DC


Iohexol (Omnipaque 240 Mg/ml) 50 ml 1X  ONCE PO  Last administered on 5/26/20at 

11:30;  Start 5/26/20 at 11:30;  Stop 5/26/20 at 11:31;  Status DC


Info (CONTRAST GIVEN -- Rx MONITORING) 1 each PRN DAILY  PRN MC SEE COMMENTS;  

Start 5/26/20 at 11:30;  Stop 5/28/20 at 11:29;  Status DC


Sodium Chloride 1,000 ml @  0 mls/hr 1X  ONCE IV  Last administered on 5/26/20at

12:27;  Start 5/26/20 at 12:15;  Stop 5/26/20 at 12:16;  Status DC


Morphine Sulfate (Morphine Sulfate) 5 mg 1X  ONCE IV  Last administered on 

5/26/20at 12:45;  Start 5/26/20 at 12:30;  Stop 5/26/20 at 12:33;  Status DC


Morphine Sulfate (Morphine Sulfate) 5 mg 1X  ONCE IV ;  Start 5/26/20 at 13:45; 

Stop 5/26/20 at 13:46;  Status DC


Morphine Sulfate (Morphine Sulfate) 4 mg PRN Q2HR  PRN IV MODERATE TO SEVERE 

PAIN Last administered on 5/28/20at 16:18;  Start 5/26/20 at 14:45;  Stop 6/1/20

at 10:23;  Status DC


Sodium Chloride 1,000 ml @  100 mls/hr Q10H IV  Last administered on 6/1/20at 

08:27;  Start 5/26/20 at 14:44;  Stop 6/1/20 at 10:23;  Status DC


Ondansetron HCl (Zofran) 4 mg PRN Q4HRS  PRN IV NAUSEA/VOMITING Last 

administered on 6/1/20at 20:53;  Start 5/26/20 at 14:45


Acetaminophen (Tylenol Supp) 650 mg PRN Q4HRS  PRN RI TEMP OVER 100.4F OR MILD 

PAIN;  Start 5/26/20 at 14:45


Enoxaparin Sodium (Lovenox 40mg Syringe) 40 mg Q24H SQ  Last administered on 

6/1/20at 15:50;  Start 5/26/20 at 15:00


Insulin Human Lispro (HumaLOG) 0-9 UNITS Q4HRS SQ  Last administered on 

5/30/20at 18:15;  Start 5/26/20 at 16:00;  Stop 5/30/20 at 19:32;  Status DC


Dextrose (Dextrose 50%-Water Syringe) 12.5 gm PRN Q15MIN  PRN IV SEE COMMENTS;  

Start 5/26/20 at 14:45


Albuterol Sulfate (Ventolin Neb Soln) 3 mg PRN QID  PRN NEB SHORTNESS OF BREATH;

 Start 5/26/20 at 15:15


Sodium Chloride (Saline Mist Nasal) 1 néstor PRN Q1HR  PRN NS NASAL CONGESTION;  

Start 5/26/20 at 15:15


Methylprednisolone Sodium Succinate (SOLU-Medrol 40MG VIAL) 40 mg DAILY IV  Last

administered on 5/28/20at 08:21;  Start 5/26/20 at 15:15;  Stop 5/28/20 at 

13:44;  Status DC


Insulin Human Regular 100 unit/ Sodium Chloride 101 ml @ 0 mls/hr CONT  PRN IV 

SEE I/O RECORD Last administered on 5/27/20at 02:12;  Start 5/26/20 at 18:00


Fentanyl Citrate (Fentanyl 2ml Vial) 50 mcg PRN Q2HR  PRN IVP MOD TO SEVERE 

PAIN, 2ND CHOICE Last administered on 6/2/20at 11:59;  Start 5/27/20 at 11:45


Famotidine (Pepcid Vial) 20 mg QHS IVP  Last administered on 5/31/20at 22:14;  

Start 5/28/20 at 21:00;  Stop 6/1/20 at 10:21;  Status DC


Prednisone (Prednisone) 3 mg DAILY PO  Last administered on 6/1/20at 08:27;  

Start 5/29/20 at 09:00


Potassium Chloride/Water 100 ml @  100 mls/hr Q1H IV  Last administered on 

5/30/20at 15:22;  Start 5/30/20 at 11:00;  Stop 5/30/20 at 14:59;  Status DC


Potassium Chloride (Klor-Con) 40 meq 1X  ONCE PO  Last administered on 5/30/20at

11:01;  Start 5/30/20 at 10:15;  Stop 5/30/20 at 10:22;  Status DC


Tramadol HCl (Ultram) 50 mg PRN Q6HRS  PRN PO MODERATE PAIN 4-6 Last 

administered on 6/2/20at 06:08;  Start 5/30/20 at 10:15


Oxycodone HCl (Roxicodone) 5 mg PRN Q6HRS  PRN PO SEVERE PAIN 7-10 Last 

administered on 5/30/20at 11:08;  Start 5/30/20 at 10:15;  Stop 5/30/20 at 

15:30;  Status DC


Magnesium Sulfate 100 ml @  25 mls/hr 1X  ONCE IV  Last administered on 

5/30/20at 11:06;  Start 5/30/20 at 11:00;  Stop 5/30/20 at 14:59;  Status DC


Magnesium Hydroxide (Milk Of Magnesia) 2,400 mg PRN DAILY  PRN PO CONSTIPATION 

2ND CHOICE;  Start 5/30/20 at 15:30


Psyllium Hydrophilic Mucilloid (Metamucil Fiber Packet) 1 pkt DAILY PO  Last 

administered on 6/1/20at 08:28;  Start 5/30/20 at 15:30


Polyethylene Glycol (miraLAX PACKET) 17 gm DAILY PO  Last administered on 

6/1/20at 08:28;  Start 5/30/20 at 15:30


Oxycodone HCl (Roxicodone) 5 mg PRN Q4HRS  PRN PO SEVERE PAIN 7-10 Last 

administered on 6/2/20at 03:41;  Start 5/30/20 at 15:30


Insulin Human Lispro (HumaLOG) 0-9 UNITS [id number] SQ  Last administered on 

6/1/20at 17:14;  Start 5/30/20 at 19:30


Famotidine (Pepcid) 20 mg QHS PO  Last administered on 6/1/20at 20:57;  Start 

6/1/20 at 21:00;  Stop 6/2/20 at 10:56;  Status DC


Iohexol (Omnipaque 300 Mg/ml) 75 ml 1X  ONCE IV  Last administered on 6/1/20at 

11:45;  Start 6/1/20 at 11:45;  Stop 6/1/20 at 11:46;  Status DC


Info (CONTRAST GIVEN -- Rx MONITORING) 1 each PRN DAILY  PRN MC SEE COMMENTS;  

Start 6/1/20 at 11:45;  Stop 6/3/20 at 11:44


Ringer's Solution 1,000 ml @  75 mls/hr [id number] IV  Last administered on 

6/2/20at 09:42;  Start 6/2/20 at 09:30


Potassium Chloride/Water 100 ml @  100 mls/hr Q1H IV  Last administered on 6/2/2

0at 12:57;  Start 6/2/20 at 10:00;  Stop 6/2/20 at 21:59


Famotidine (Pepcid Vial) 20 mg QHS IVP ;  Start 6/2/20 at 21:00


Bisacodyl (Dulcolax Supp) 10 mg PRN DAILY  PRN RI CONSTIPATION;  Start 6/2/20 at

11:00





Active Scripts


Active


Reported


Zyrtec (Cetirizine Hcl) 10 Mg Capsule 10 Mg PO DAILY


Methotrexate (Methotrexate Sodium) 2.5 Mg Tablet 8 Tab PO WEEKLY


Protonix  ** (Pantoprazole Sodium) 40 Mg Tablet.dr 40 Mg PO DAILYAC


Folic Acid 0.8 Mg Capsule 1 Cap PO DAILY 30 Days


Ferrous Sulfate 325 Mg Tablet 1 Tab PO DAILY


Vitals/I & O





Vital Sign - Last 24 Hours








 6/1/20 6/1/20 6/1/20 6/1/20





 15:00 15:49 17:14 19:00


 


Temp 98.0   99.0





 98.0   99.0


 


Pulse 90   93


 


Resp 17   18


 


B/P (MAP) 128/69 (88)   140/70 (93)


 


Pulse Ox 95   92


 


O2 Delivery Room Air Room Air Room Air 


 


    





    





 6/1/20 6/1/20 6/1/20 6/1/20





 19:12 20:00 20:12 20:57


 


O2 Delivery Room Air Room Air Room Air Room Air





 6/1/20 6/1/20 6/2/20 6/2/20





 21:57 23:00 03:00 03:41


 


Temp  98.5 98.3 





  98.5 98.3 


 


Pulse  90 86 


 


Resp  18 16 


 


B/P (MAP)  113/74 (87) 110/60 (77) 


 


Pulse Ox  97 96 


 


O2 Delivery Room Air   Nasal Cannula


 


    





    





 6/2/20 6/2/20 6/2/20 6/2/20





 04:41 06:08 07:00 07:00


 


Temp    98.3





    98.3


 


Pulse    88


 


Resp    17


 


B/P (MAP)    140/72 (94)


 


Pulse Ox    92


 


O2 Delivery Nasal Cannula Nasal Cannula Room Air Room Air


 


O2 Flow Rate  2.0  


 


    





    





 6/2/20 6/2/20 6/2/20 6/2/20





 07:33 10:52 11:59 12:57


 


Temp  98.0  





  98.0  


 


Pulse  94  


 


Resp  17  


 


B/P (MAP)  142/73 (96)  


 


Pulse Ox  92  


 


O2 Delivery Room Air  Room Air Room Air














Intake and Output   


 


 6/1/20 6/1/20 6/2/20





 15:00 23:00 07:00


 


Intake Total 750 ml 300 ml 


 


Balance 750 ml 300 ml 











Hemodynamically unstable?:  No


Is patient in severe pain?:  No


Is NPO status required?:  No











ERWIN DUNLAP MD              Jun 2, 2020 13:41

## 2020-06-02 NOTE — PDOC
Subjective:


Subjective:


I saw her earlier this morning - had some clear liquids for breakfast, still has

abd pain.


Discussed interval CT results w/ recommendation to hold off on diet for now - 

she is agreeable.





Objective:


Vital Signs:





                                   Vital Signs








  Date Time  Temp Pulse Resp B/P (MAP) Pulse Ox O2 Delivery O2 Flow Rate FiO2


 


6/2/20 07:33      Room Air  


 


6/2/20 07:00 98.3 88 17 140/72 (94) 92   





 98.3       


 


6/2/20 06:08       2.0 








Labs:





Laboratory Tests








Test


 6/1/20


11:11 6/1/20


16:17 6/1/20


21:11 6/2/20


06:55


 


Glucose (Fingerstick) 204 mg/dL  183 mg/dL  126 mg/dL  152 mg/dL 


 


Test


 6/2/20


08:50 


 


 





 


White Blood Count 1.6 x10^3/uL    


 


Red Blood Count 3.96 x10^6/uL    


 


Hemoglobin 11.1 g/dL    


 


Hematocrit 33.2 %    


 


Mean Corpuscular Volume 84 fL    


 


Mean Corpuscular Hemoglobin 28 pg    


 


Mean Corpuscular Hemoglobin


Concent 33 g/dL 


 


 


 





 


Red Cell Distribution Width 15.7 %    


 


Platelet Count 208 x10^3/uL    


 


Neutrophils (%) (Auto) 38 %    


 


Lymphocytes (%) (Auto) 25 %    


 


Monocytes (%) (Auto) 33 %    


 


Eosinophils (%) (Auto) 4 %    


 


Basophils (%) (Auto) 0 %    


 


Neutrophils # (Auto) 0.6 x10^3/uL    


 


Lymphocytes # (Auto) 0.4 x10^3/uL    


 


Monocytes # (Auto) 0.5 x10^3/uL    


 


Eosinophils # (Auto) 0.1 x10^3/uL    


 


Basophils # (Auto) 0.0 x10^3/uL    


 


Sodium Level 140 mmol/L    


 


Potassium Level 2.5 mmol/L    


 


Chloride Level 99 mmol/L    


 


Carbon Dioxide Level 31 mmol/L    


 


Anion Gap 10    


 


Blood Urea Nitrogen 2 mg/dL    


 


Creatinine 0.6 mg/dL    


 


Estimated GFR


(Cockcroft-Gault) 102.7 


 


 


 





 


BUN/Creatinine Ratio 3    


 


Glucose Level 223 mg/dL    


 


Calcium Level 8.5 mg/dL    


 


Total Bilirubin 0.6 mg/dL    


 


Aspartate Amino Transf


(AST/SGOT) 14 U/L 


 


 


 





 


Alanine Aminotransferase


(ALT/SGPT) 20 U/L 


 


 


 





 


Alkaline Phosphatase 84 U/L    


 


Total Protein 6.2 g/dL    


 


Albumin 2.2 g/dL    


 


Albumin/Globulin Ratio 0.6    


 


Lipase 133 U/L    








C


Imaging:


CT A/P 6/1/20


IMPRESSION:


1. Progression of findings of acute pancreatitis. Pancreatic enhancement pattern

is slightly heterogeneous but no definable nonenhancing component to suggest 

liquefactive necrosis at this time. In addition, there is been substantial 

increase in left lateral peripancreatic fluid which extends laterally to the 

left in the region of the lateral conal and renal fascia,and dissects distally 

into the left pelvis. This could be infected fluid, but does not appear to be an

encapsulated collection.


2. Small pleural effusions at both lung bases with bibasilar atelectasis and/or 

infiltrate.


3. Mild wall thickening of the posterior descending colon, most likely secondary

or reactive given its proximity to the fluid/edema


4. Mild wall thickening of the distal stomach and duodenum, also likely reactive

and due to nondistention.


5. Narrowing or stenosis at the origin of the celiac axis, but note there is e

vidence of distal enhancement.





PE:





GEN: NAD


LUNGS: CTAB


HEART: RRR


ABD: some distention, quiet, epigastric to LUQ/left mid abd discomfort


NEURO/PSYCH: A & O 3





A/P:


Pancreatitis, hypertriglyceridemia - interval CT as above


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


Leukopenia, hypokalemia - per primary





--


Reviewed CT w/ Dr. Parra - consider TPN - d/w Dr. Valles - NPO/observe for 

now.





Justicifation of Admission Dx:


Justifications for Admission:


Justification of Admission Dx:  Yes











AILYN OWEN          Jun 2, 2020 10:54

## 2020-06-02 NOTE — NUR
SW following. Discussed with RN, pt has critical labs today. Has downgraded diet to NPO. CT 
scan yesterday was worse than before. SW will continue to follow.

## 2020-06-02 NOTE — PDOC2
CONSULT


Date of Consult


Date of Consult


DATE: 6/2/20 


TIME: 16:36





Reason for Consult


Reason for Consult:


pancreatitis





Referring Physician


Referring Physician:


Dr. Parra





Identification/Chief Complaint


Chief Complaint


abd pain





Source


Source:  Chart review





History of Present Illness


Reason for Visit:


58 yo F with c/o abd pain.  She has previously had cholecystectomy in the 

.  Pt does report feeling somewhat better.  No current N/V.





Past Medical History


Pulmonary:  Asthma, Other (NATO on CPAP)


GI:  GERD, Other (Celiac disease)


Heme/Onc:  Anemia NOS


Rheumatologic:  Other (Stills disease)


Dermatology:  No pertinent hx





Past Surgical History


Past Surgical History:  Cholecystectomy, Hysterectomy, Other (Bilateral 

bunionectomy, right shoulder arthroscopy)





Family History


Family History:  Diabetes





Social History


Quit


ALCOHOL:  none


Drugs:  None





Current Problem List


Problem List


Problems


Medical Problems:


(1) Dehydration


Status: Acute  





(2) Hyperglycemia


Status: Acute  





(3) Pancreatitis, acute


Status: Acute  











Current Medications


Current Medications





Current Medications


Iohexol (Omnipaque 300 Mg/ml) 75 ml 1X  ONCE IV  Last administered on 5/26/20at 

11:30;  Start 5/26/20 at 11:30;  Stop 5/26/20 at 11:31;  Status DC


Iohexol (Omnipaque 240 Mg/ml) 50 ml 1X  ONCE PO  Last administered on 5/26/20at 

11:30;  Start 5/26/20 at 11:30;  Stop 5/26/20 at 11:31;  Status DC


Info (CONTRAST GIVEN -- Rx MONITORING) 1 each PRN DAILY  PRN MC SEE COMMENTS;  

Start 5/26/20 at 11:30;  Stop 5/28/20 at 11:29;  Status DC


Sodium Chloride 1,000 ml @  0 mls/hr 1X  ONCE IV  Last administered on 5/26/20at

12:27;  Start 5/26/20 at 12:15;  Stop 5/26/20 at 12:16;  Status DC


Morphine Sulfate (Morphine Sulfate) 5 mg 1X  ONCE IV  Last administered on 

5/26/20at 12:45;  Start 5/26/20 at 12:30;  Stop 5/26/20 at 12:33;  Status DC


Morphine Sulfate (Morphine Sulfate) 5 mg 1X  ONCE IV ;  Start 5/26/20 at 13:45; 

Stop 5/26/20 at 13:46;  Status DC


Morphine Sulfate (Morphine Sulfate) 4 mg PRN Q2HR  PRN IV MODERATE TO SEVERE 

PAIN Last administered on 5/28/20at 16:18;  Start 5/26/20 at 14:45;  Stop 6/1/20

at 10:23;  Status DC


Sodium Chloride 1,000 ml @  100 mls/hr Q10H IV  Last administered on 6/1/20at 

08:27;  Start 5/26/20 at 14:44;  Stop 6/1/20 at 10:23;  Status DC


Ondansetron HCl (Zofran) 4 mg PRN Q4HRS  PRN IV NAUSEA/VOMITING Last 

administered on 6/2/20at 14:06;  Start 5/26/20 at 14:45


Acetaminophen (Tylenol Supp) 650 mg PRN Q4HRS  PRN VA TEMP OVER 100.4F OR MILD 

PAIN;  Start 5/26/20 at 14:45


Enoxaparin Sodium (Lovenox 40mg Syringe) 40 mg Q24H SQ  Last administered on 

6/2/20at 15:02;  Start 5/26/20 at 15:00


Insulin Human Lispro (HumaLOG) 0-9 UNITS Q4HRS SQ  Last administered on 

5/30/20at 18:15;  Start 5/26/20 at 16:00;  Stop 5/30/20 at 19:32;  Status DC


Dextrose (Dextrose 50%-Water Syringe) 12.5 gm PRN Q15MIN  PRN IV SEE COMMENTS;  

Start 5/26/20 at 14:45


Albuterol Sulfate (Ventolin Neb Soln) 3 mg PRN QID  PRN NEB SHORTNESS OF BREATH;

 Start 5/26/20 at 15:15


Sodium Chloride (Saline Mist Nasal) 1 néstor PRN Q1HR  PRN NS NASAL CONGESTION;  

Start 5/26/20 at 15:15


Methylprednisolone Sodium Succinate (SOLU-Medrol 40MG VIAL) 40 mg DAILY IV  Last

administered on 5/28/20at 08:21;  Start 5/26/20 at 15:15;  Stop 5/28/20 at 

13:44;  Status DC


Insulin Human Regular 100 unit/ Sodium Chloride 101 ml @ 0 mls/hr CONT  PRN IV 

SEE I/O RECORD Last administered on 5/27/20at 02:12;  Start 5/26/20 at 18:00


Fentanyl Citrate (Fentanyl 2ml Vial) 50 mcg PRN Q2HR  PRN IVP MOD TO SEVERE 

PAIN, 2ND CHOICE Last administered on 6/2/20at 16:08;  Start 5/27/20 at 11:45


Famotidine (Pepcid Vial) 20 mg QHS IVP  Last administered on 5/31/20at 22:14;  

Start 5/28/20 at 21:00;  Stop 6/1/20 at 10:21;  Status DC


Prednisone (Prednisone) 3 mg DAILY PO  Last administered on 6/1/20at 08:27;  

Start 5/29/20 at 09:00


Potassium Chloride/Water 100 ml @  100 mls/hr Q1H IV  Last administered on 

5/30/20at 15:22;  Start 5/30/20 at 11:00;  Stop 5/30/20 at 14:59;  Status DC


Potassium Chloride (Klor-Con) 40 meq 1X  ONCE PO  Last administered on 5/30/20at

11:01;  Start 5/30/20 at 10:15;  Stop 5/30/20 at 10:22;  Status DC


Tramadol HCl (Ultram) 50 mg PRN Q6HRS  PRN PO MODERATE PAIN 4-6 Last adminis

tered on 6/2/20at 06:08;  Start 5/30/20 at 10:15


Oxycodone HCl (Roxicodone) 5 mg PRN Q6HRS  PRN PO SEVERE PAIN 7-10 Last adminis

tered on 5/30/20at 11:08;  Start 5/30/20 at 10:15;  Stop 5/30/20 at 15:30;  

Status DC


Magnesium Sulfate 100 ml @  25 mls/hr 1X  ONCE IV  Last administered on 

5/30/20at 11:06;  Start 5/30/20 at 11:00;  Stop 5/30/20 at 14:59;  Status DC


Magnesium Hydroxide (Milk Of Magnesia) 2,400 mg PRN DAILY  PRN PO CONSTIPATION 

2ND CHOICE;  Start 5/30/20 at 15:30


Psyllium Hydrophilic Mucilloid (Metamucil Fiber Packet) 1 pkt DAILY PO  Last 

administered on 6/1/20at 08:28;  Start 5/30/20 at 15:30


Polyethylene Glycol (miraLAX PACKET) 17 gm DAILY PO  Last administered on 

6/1/20at 08:28;  Start 5/30/20 at 15:30


Oxycodone HCl (Roxicodone) 5 mg PRN Q4HRS  PRN PO SEVERE PAIN 7-10 Last 

administered on 6/2/20at 03:41;  Start 5/30/20 at 15:30


Insulin Human Lispro (HumaLOG) 0-9 UNITS [id number] SQ  Last administered on 

6/1/20at 17:14;  Start 5/30/20 at 19:30


Famotidine (Pepcid) 20 mg QHS PO  Last administered on 6/1/20at 20:57;  Start 

6/1/20 at 21:00;  Stop 6/2/20 at 10:56;  Status DC


Iohexol (Omnipaque 300 Mg/ml) 75 ml 1X  ONCE IV  Last administered on 6/1/20at 

11:45;  Start 6/1/20 at 11:45;  Stop 6/1/20 at 11:46;  Status DC


Info (CONTRAST GIVEN -- Rx MONITORING) 1 each PRN DAILY  PRN MC SEE COMMENTS;  

Start 6/1/20 at 11:45;  Stop 6/3/20 at 11:44


Ringer's Solution 1,000 ml @  75 mls/hr [id number] IV  Last administered on 

6/2/20at 09:42;  Start 6/2/20 at 09:30


Potassium Chloride/Water 100 ml @  100 mls/hr Q1H IV  Last administered on 

6/2/20at 16:01;  Start 6/2/20 at 10:00;  Stop 6/2/20 at 21:59


Famotidine (Pepcid Vial) 20 mg QHS IVP ;  Start 6/2/20 at 21:00


Bisacodyl (Dulcolax Supp) 10 mg PRN DAILY  PRN [facility] CONSTIPATION;  Start 6/2/20 at

11:00





Active Scripts


Active


Reported


Zyrtec (Cetirizine Hcl) 10 Mg Capsule 10 Mg PO DAILY


Methotrexate (Methotrexate Sodium) 2.5 Mg Tablet 8 Tab PO WEEKLY


Protonix  ** (Pantoprazole Sodium) 40 Mg Tablet.dr 40 Mg PO DAILYAC


Folic Acid 0.8 Mg Capsule 1 Cap PO DAILY 30 Days


Ferrous Sulfate 325 Mg Tablet 1 Tab PO DAILY





Allergies


Allergies:  


Coded Allergies:  


     coconut (Verified  Allergy, Intermediate, HIVES, 5/26/20)





ROS


Gastrointestinal:  Yes Abdominal Pain





Physical Exam


General:  Alert, Oriented X3, Cooperative, mild distress


HEENT:  Atraumatic


Lungs:  Normal air movement


Abdomen:  Soft, Other (mild  TTP)


Extremities:  No clubbing, No cyanosis


Skin:  No rashes, No breakdown


Neuro:  Normal speech, Sensation intact


Psych/Mental Status:  Mental status NL, Mood NL





Vitals


VITALS





Vital Signs








  Date Time  Temp Pulse Resp B/P (MAP) Pulse Ox O2 Delivery O2 Flow Rate FiO2


 


6/2/20 16:08      Room Air  


 


6/2/20 15:00 98.0 90 17 148/72 (97) 93   





 98.0       


 


6/2/20 06:08       2.0 











Labs


Labs





Laboratory Tests








Test


 5/31/20


17:00 5/31/20


20:48 6/1/20


07:27 6/1/20


11:11


 


Glucose (Fingerstick)


 138 mg/dL


(70-99) 185 mg/dL


(70-99) 160 mg/dL


(70-99) 204 mg/dL


(70-99)


 


Test


 6/1/20


16:17 6/1/20


21:11 6/2/20


06:55 6/2/20


08:50


 


Glucose (Fingerstick)


 183 mg/dL


(70-99) 126 mg/dL


(70-99) 152 mg/dL


(70-99) 





 


White Blood Count


 


 


 


 1.6 x10^3/uL


(4.0-11.0)


 


Red Blood Count


 


 


 


 3.96 x10^6/uL


(3.50-5.40)


 


Hemoglobin


 


 


 


 11.1 g/dL


(12.0-15.5)


 


Hematocrit


 


 


 


 33.2 %


(36.0-47.0)


 


Mean Corpuscular Volume    84 fL () 


 


Mean Corpuscular Hemoglobin    28 pg (25-35) 


 


Mean Corpuscular Hemoglobin


Concent 


 


 


 33 g/dL


(31-37)


 


Red Cell Distribution Width


 


 


 


 15.7 %


(11.5-14.5)


 


Platelet Count


 


 


 


 208 x10^3/uL


(140-400)


 


Neutrophils (%) (Auto)    38 % (31-73) 


 


Lymphocytes (%) (Auto)    25 % (24-48) 


 


Monocytes (%) (Auto)    33 % (0-9) 


 


Eosinophils (%) (Auto)    4 % (0-3) 


 


Basophils (%) (Auto)    0 % (0-3) 


 


Neutrophils # (Auto)


 


 


 


 0.6 x10^3/uL


(1.8-7.7)


 


Lymphocytes # (Auto)


 


 


 


 0.4 x10^3/uL


(1.0-4.8)


 


Monocytes # (Auto)


 


 


 


 0.5 x10^3/uL


(0.0-1.1)


 


Eosinophils # (Auto)


 


 


 


 0.1 x10^3/uL


(0.0-0.7)


 


Basophils # (Auto)


 


 


 


 0.0 x10^3/uL


(0.0-0.2)


 


Sodium Level


 


 


 


 140 mmol/L


(136-145)


 


Potassium Level


 


 


 


 2.5 mmol/L


(3.5-5.1)


 


Chloride Level


 


 


 


 99 mmol/L


()


 


Carbon Dioxide Level


 


 


 


 31 mmol/L


(21-32)


 


Anion Gap    10 (6-14) 


 


Blood Urea Nitrogen    2 mg/dL (7-20) 


 


Creatinine


 


 


 


 0.6 mg/dL


(0.6-1.0)


 


Estimated GFR


(Cockcroft-Gault) 


 


 


 102.7 





 


BUN/Creatinine Ratio    3 (6-20) 


 


Glucose Level


 


 


 


 223 mg/dL


(70-99)


 


Calcium Level


 


 


 


 8.5 mg/dL


(8.5-10.1)


 


Total Bilirubin


 


 


 


 0.6 mg/dL


(0.2-1.0)


 


Aspartate Amino Transf


(AST/SGOT) 


 


 


 14 U/L (15-37) 





 


Alanine Aminotransferase


(ALT/SGPT) 


 


 


 20 U/L (14-59) 





 


Alkaline Phosphatase


 


 


 


 84 U/L


()


 


Total Protein


 


 


 


 6.2 g/dL


(6.4-8.2)


 


Albumin


 


 


 


 2.2 g/dL


(3.4-5.0)


 


Albumin/Globulin Ratio    0.6 (1.0-1.7) 


 


Lipase


 


 


 


 133 U/L


()


 


Test


 6/2/20


11:15 6/2/20


16:28 


 





 


Glucose (Fingerstick)


 192 mg/dL


(70-99) 145 mg/dL


(70-99) 


 











Laboratory Tests








Test


 6/1/20


21:11 6/2/20


06:55 6/2/20


08:50 6/2/20


11:15


 


Glucose (Fingerstick)


 126 mg/dL


(70-99) 152 mg/dL


(70-99) 


 192 mg/dL


(70-99)


 


White Blood Count


 


 


 1.6 x10^3/uL


(4.0-11.0) 





 


Red Blood Count


 


 


 3.96 x10^6/uL


(3.50-5.40) 





 


Hemoglobin


 


 


 11.1 g/dL


(12.0-15.5) 





 


Hematocrit


 


 


 33.2 %


(36.0-47.0) 





 


Mean Corpuscular Volume   84 fL ()  


 


Mean Corpuscular Hemoglobin   28 pg (25-35)  


 


Mean Corpuscular Hemoglobin


Concent 


 


 33 g/dL


(31-37) 





 


Red Cell Distribution Width


 


 


 15.7 %


(11.5-14.5) 





 


Platelet Count


 


 


 208 x10^3/uL


(140-400) 





 


Neutrophils (%) (Auto)   38 % (31-73)  


 


Lymphocytes (%) (Auto)   25 % (24-48)  


 


Monocytes (%) (Auto)   33 % (0-9)  


 


Eosinophils (%) (Auto)   4 % (0-3)  


 


Basophils (%) (Auto)   0 % (0-3)  


 


Neutrophils # (Auto)


 


 


 0.6 x10^3/uL


(1.8-7.7) 





 


Lymphocytes # (Auto)


 


 


 0.4 x10^3/uL


(1.0-4.8) 





 


Monocytes # (Auto)


 


 


 0.5 x10^3/uL


(0.0-1.1) 





 


Eosinophils # (Auto)


 


 


 0.1 x10^3/uL


(0.0-0.7) 





 


Basophils # (Auto)


 


 


 0.0 x10^3/uL


(0.0-0.2) 





 


Sodium Level


 


 


 140 mmol/L


(136-145) 





 


Potassium Level


 


 


 2.5 mmol/L


(3.5-5.1) 





 


Chloride Level


 


 


 99 mmol/L


() 





 


Carbon Dioxide Level


 


 


 31 mmol/L


(21-32) 





 


Anion Gap   10 (6-14)  


 


Blood Urea Nitrogen   2 mg/dL (7-20)  


 


Creatinine


 


 


 0.6 mg/dL


(0.6-1.0) 





 


Estimated GFR


(Cockcroft-Gault) 


 


 102.7 


 





 


BUN/Creatinine Ratio   3 (6-20)  


 


Glucose Level


 


 


 223 mg/dL


(70-99) 





 


Calcium Level


 


 


 8.5 mg/dL


(8.5-10.1) 





 


Total Bilirubin


 


 


 0.6 mg/dL


(0.2-1.0) 





 


Aspartate Amino Transf


(AST/SGOT) 


 


 14 U/L (15-37) 


 





 


Alanine Aminotransferase


(ALT/SGPT) 


 


 20 U/L (14-59) 


 





 


Alkaline Phosphatase


 


 


 84 U/L


() 





 


Total Protein


 


 


 6.2 g/dL


(6.4-8.2) 





 


Albumin


 


 


 2.2 g/dL


(3.4-5.0) 





 


Albumin/Globulin Ratio   0.6 (1.0-1.7)  


 


Lipase


 


 


 133 U/L


() 





 


Test


 6/2/20


16:28 


 


 





 


Glucose (Fingerstick)


 145 mg/dL


(70-99) 


 


 














Images


Images


CT with changes of pancreatitis, no obvious necrosis





Assessment/Plan


Assessment/Plan


Severe pancreatitis


agree with supportive care


no current surgical plans





Thanks for consult!











PAYAL HAYDEN MD              Jun 2, 2020 16:40

## 2020-06-03 VITALS — DIASTOLIC BLOOD PRESSURE: 67 MMHG | SYSTOLIC BLOOD PRESSURE: 134 MMHG

## 2020-06-03 VITALS — DIASTOLIC BLOOD PRESSURE: 77 MMHG | SYSTOLIC BLOOD PRESSURE: 143 MMHG

## 2020-06-03 VITALS — SYSTOLIC BLOOD PRESSURE: 154 MMHG | DIASTOLIC BLOOD PRESSURE: 73 MMHG

## 2020-06-03 VITALS — SYSTOLIC BLOOD PRESSURE: 128 MMHG | DIASTOLIC BLOOD PRESSURE: 76 MMHG

## 2020-06-03 VITALS — DIASTOLIC BLOOD PRESSURE: 58 MMHG | SYSTOLIC BLOOD PRESSURE: 132 MMHG

## 2020-06-03 VITALS — SYSTOLIC BLOOD PRESSURE: 138 MMHG | DIASTOLIC BLOOD PRESSURE: 62 MMHG

## 2020-06-03 LAB
ANION GAP SERPL CALC-SCNC: 13 MMOL/L (ref 6–14)
BUN SERPL-MCNC: 3 MG/DL (ref 7–20)
CALCIUM SERPL-MCNC: 8.9 MG/DL (ref 8.5–10.1)
CHLORIDE SERPL-SCNC: 100 MMOL/L (ref 98–107)
CO2 SERPL-SCNC: 28 MMOL/L (ref 21–32)
CREAT SERPL-MCNC: 0.6 MG/DL (ref 0.6–1)
GFR SERPLBLD BASED ON 1.73 SQ M-ARVRAT: 102.7 ML/MIN
GLUCOSE SERPL-MCNC: 171 MG/DL (ref 70–99)
POTASSIUM SERPL-SCNC: 3.3 MMOL/L (ref 3.5–5.1)
SODIUM SERPL-SCNC: 141 MMOL/L (ref 136–145)

## 2020-06-03 RX ADMIN — FENTANYL CITRATE PRN MCG: 50 INJECTION INTRAMUSCULAR; INTRAVENOUS at 17:09

## 2020-06-03 RX ADMIN — FENTANYL CITRATE PRN MCG: 50 INJECTION INTRAMUSCULAR; INTRAVENOUS at 09:56

## 2020-06-03 RX ADMIN — FENTANYL CITRATE PRN MCG: 50 INJECTION INTRAMUSCULAR; INTRAVENOUS at 02:22

## 2020-06-03 RX ADMIN — INSULIN LISPRO SCH UNITS: 100 INJECTION, SOLUTION INTRAVENOUS; SUBCUTANEOUS at 17:00

## 2020-06-03 RX ADMIN — PSYLLIUM HUSK SCH PKT: 3.4 POWDER ORAL at 08:39

## 2020-06-03 RX ADMIN — POTASSIUM CHLORIDE SCH MLS/HR: 7.46 INJECTION, SOLUTION INTRAVENOUS at 20:08

## 2020-06-03 RX ADMIN — FENTANYL CITRATE PRN MCG: 50 INJECTION INTRAMUSCULAR; INTRAVENOUS at 14:18

## 2020-06-03 RX ADMIN — SODIUM CHLORIDE, SODIUM LACTATE, POTASSIUM CHLORIDE, AND CALCIUM CHLORIDE SCH MLS/HR: .6; .31; .03; .02 INJECTION, SOLUTION INTRAVENOUS at 02:22

## 2020-06-03 RX ADMIN — FENTANYL CITRATE PRN MCG: 50 INJECTION INTRAMUSCULAR; INTRAVENOUS at 06:26

## 2020-06-03 RX ADMIN — FAMOTIDINE SCH MG: 10 INJECTION, SOLUTION INTRAVENOUS at 21:00

## 2020-06-03 RX ADMIN — POTASSIUM CHLORIDE SCH MLS/HR: 7.46 INJECTION, SOLUTION INTRAVENOUS at 22:30

## 2020-06-03 RX ADMIN — INSULIN LISPRO SCH UNITS: 100 INJECTION, SOLUTION INTRAVENOUS; SUBCUTANEOUS at 11:29

## 2020-06-03 RX ADMIN — ENOXAPARIN SODIUM SCH MG: 40 INJECTION SUBCUTANEOUS at 17:07

## 2020-06-03 RX ADMIN — POLYETHYLENE GLYCOL 3350 SCH GM: 17 POWDER, FOR SOLUTION ORAL at 08:39

## 2020-06-03 RX ADMIN — INSULIN LISPRO SCH UNITS: 100 INJECTION, SOLUTION INTRAVENOUS; SUBCUTANEOUS at 08:00

## 2020-06-03 RX ADMIN — POTASSIUM CHLORIDE SCH MLS/HR: 7.46 INJECTION, SOLUTION INTRAVENOUS at 23:49

## 2020-06-03 RX ADMIN — FENTANYL CITRATE PRN MCG: 50 INJECTION INTRAMUSCULAR; INTRAVENOUS at 21:00

## 2020-06-03 RX ADMIN — SODIUM CHLORIDE, SODIUM LACTATE, POTASSIUM CHLORIDE, AND CALCIUM CHLORIDE SCH MLS/HR: .6; .31; .03; .02 INJECTION, SOLUTION INTRAVENOUS at 14:20

## 2020-06-03 RX ADMIN — INSULIN LISPRO SCH UNITS: 100 INJECTION, SOLUTION INTRAVENOUS; SUBCUTANEOUS at 21:00

## 2020-06-03 NOTE — PDOC
Subjective:


Subjective:


Feels better w/ less abd pain, but then says she thinks it's time for pain meds.





Objective:


Objective:


D/w Dr. Valles.


Vital Signs:





                                   Vital Signs








  Date Time  Temp Pulse Resp B/P (MAP) Pulse Ox O2 Delivery O2 Flow Rate FiO2


 


6/3/20 10:51 97.9 80 18 138/62 (87) 94 Room Air  





 97.9       


 


6/3/20 09:56       2.0 








Labs:





Laboratory Tests








Test


 6/2/20


11:15 6/2/20


16:28 6/2/20


20:38 6/3/20


04:10


 


Glucose (Fingerstick) 192 mg/dL  145 mg/dL  137 mg/dL  


 


Sodium Level    141 mmol/L 


 


Potassium Level    3.3 mmol/L 


 


Chloride Level    100 mmol/L 


 


Carbon Dioxide Level    28 mmol/L 


 


Anion Gap    13 


 


Blood Urea Nitrogen    3 mg/dL 


 


Creatinine    0.6 mg/dL 


 


Estimated GFR


(Cockcroft-Gault) 


 


 


 102.7 





 


Glucose Level    171 mg/dL 


 


Calcium Level    8.9 mg/dL 


 


Test


 6/3/20


06:57 


 


 





 


Glucose (Fingerstick) 153 mg/dL    











PE:





GEN: NAD


LUNGS: CTAB


HEART: RRR


ABD: soft to left, still a bit uncomfortable


NEURO/PSYCH: A & O 3





A/P:


Pancreatitis, hypertriglyceridemia - interval CT 6/1 worse, surgery and ID now 

following


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


Leukopenia





--


Continue NPO - ?PICC/TPN





Justicifation of Admission Dx:


Justifications for Admission:


Justification of Admission Dx:  Yes











AILYN OWEN          Skyler 3, 2020 11:08

## 2020-06-03 NOTE — PDOC
PROGRESS NOTES


Chief Complaint


Chief Complaint


Acute pancreatitis - likely 2/2 hyper-triglyceridemia.  IgG4 RD less likely 

given level of 72 mg/DL and chronic immunosuppression on methotrexate and 

prednisone.  I discussed with [facility] rheumatology concern that MTX could play a 

role as well.  Hold dosing for leukopenia


Duodenal and jejunal intraluminal lipomas


Hypertriglyceridemia - will need outpatient Endocrinology referral for 

cholesterol metabolism treatment, I have suggested prescription 2g TID fish oil 

(Vascepa or Lovaza) in the meantime once pancreatitis resolves


Hyponatremia


DM2 with Hyperglycemia - A1c 13.6. Should continue on insulin therapy until A1c 

< 8, then consider PO options. Would probably have to avoid GLP1 therapy given 

her pancreatitis.  Would defer to an endocrinologist for this.


Celiac disease 


Adult onset stills disease


Anemia


Asthma


NATO on CPAP


GERD 


Leukopenia -likely related to chronic immunosuppression will hold MTX therapy 

for now and change to a prednisone taper on discharge.





FEN - LR for maintenance, NPO, will wait for recommendations from our consultant

regarding diet


PPX - lovenox


FULL CODE


DIspo - inpatient for pancreatitis recovery





History of Present Illness


History of Present Illness


Ms Almanza is a 58yo F [employer]  w/ PMHx celiac disease, adult onset stills 

disease, Anemia, Asthma, NATO on CPAP, GERD who presents with severe upper 

abdominal pain, nausea, vomiting, fatigue. She states this started suddenly this

morning while she was working from home on teleconference (works as a 

on the  base). She only had oatmeal for breakfast.


She has never had anything similar to this in the past.  She generally feels 

unwell.  She is not had any fever, diarrhea, chest pain, shortness of breath, 

dysuria, hematuria, blood in her stools.  She denies alcohol abuse.  Pain does 

not move anywhere.  Nothing seems to make it better or worse.  She has not tried

anything at home.  He does not drink alcohol, and is status post 

cholecystectomy.  No calcium disorders.


She takes 20mg methotrexate weekly on Fridays and is on 3 mg of chronic 

prednisone for her stills disease.  She was diagnosed with celiac disease 

January 2020 and has just recently started on a gluten-free diet.  No recent 

sick contacts that she can recall.


Labs significant for lipase 85919, glucose 477, , bilirubin 1.1, Calcium 

8.3, K 3.7, WBC 6.3, Hb 15, Platelets 247


CT abdomen pelvis shows surgically absent gallbladder and surgically absent 

uterus and describes duodenal and jejunal intraluminal lipomas. Findings of 

acute pancreatitis. No loculated collections. No biliary dilatation.


Admitted for further treatment.





5/27: Lipase down. A1c 13.6, Triglycerides 3252. Started on insulin gtt.


5/28: Abdominal pain and glucose improved.  Having ice chips per GI.  Afebrile. 

No CP or SOB. D/w [facility] rheumatology, to hold MTX therapy on 5/29 given her 

leukopenia and pancreatitis.


5/29: Overnight afebrile.  Labs improved. Still with left-sided abdominal pain. 

WBC 1.8.  She notes she had not previously had a formal diagnosis of diabetes.  

No CP or SOB


5/30: Pain improved, advancing diet per GI lipase 328.  WBC 2, K2.6, MG 1.4


5/31: Afebrile overnight.  Still with abdominal pain, no CP or SOB


6/1: Patient continues to have discomfort especially when she has a diet no 

fever or chills were reported nevertheless she says that she woke up drenched in

sweat in the middle of night, no other complaints were reported overnight, plan 

of care discussed in detail


6/2: Patient will continue to have bowel rest in light of her CT from yesterday.

No new complaints.





Vitals


Vitals





Vital Signs








  Date Time  Temp Pulse Resp B/P (MAP) Pulse Ox O2 Delivery O2 Flow Rate FiO2


 


6/3/20 08:00      Room Air  


 


6/3/20 07:00 97.8 78 18 134/67 (89) 92   





 97.8       


 


6/3/20 03:49       2.0 











Physical Exam


General:  Alert, Oriented X3, Cooperative, mild distress


Abdomen:  Soft, Other (mild  TTP)


Extremities:  No clubbing, No cyanosis


Skin:  No rashes, No breakdown





Labs


LABS





Laboratory Tests








Test


 6/2/20


11:15 6/2/20


16:28 6/2/20


20:38 6/3/20


04:10


 


Glucose (Fingerstick)


 192 mg/dL


(70-99) 145 mg/dL


(70-99) 137 mg/dL


(70-99) 





 


Sodium Level


 


 


 


 141 mmol/L


(136-145)


 


Potassium Level


 


 


 


 3.3 mmol/L


(3.5-5.1)


 


Chloride Level


 


 


 


 100 mmol/L


()


 


Carbon Dioxide Level


 


 


 


 28 mmol/L


(21-32)


 


Anion Gap    13 (6-14) 


 


Blood Urea Nitrogen    3 mg/dL (7-20) 


 


Creatinine


 


 


 


 0.6 mg/dL


(0.6-1.0)


 


Estimated GFR


(Cockcroft-Gault) 


 


 


 102.7 





 


Glucose Level


 


 


 


 171 mg/dL


(70-99)


 


Calcium Level


 


 


 


 8.9 mg/dL


(8.5-10.1)


 


Test


 6/3/20


06:57 


 


 





 


Glucose (Fingerstick)


 153 mg/dL


(70-99) 


 


 














Assessment and Plan


Assessmemt and Plan


Problems


Medical Problems:


(1) Dehydration


Status: Acute  





(2) Hyperglycemia


Status: Acute  





(3) Pancreatitis, acute


Status: Acute  











Comment


Review of Relevant


I have reviewed the following items elizabeth (where applicable) has been applied.


Labs





Laboratory Tests








Test


 6/1/20


11:11 6/1/20


16:17 6/1/20


21:11 6/2/20


06:55


 


Glucose (Fingerstick)


 204 mg/dL


(70-99) 183 mg/dL


(70-99) 126 mg/dL


(70-99) 152 mg/dL


(70-99)


 


Test


 6/2/20


08:50 6/2/20


11:15 6/2/20


16:28 6/2/20


20:38


 


White Blood Count


 1.6 x10^3/uL


(4.0-11.0) 


 


 





 


Red Blood Count


 3.96 x10^6/uL


(3.50-5.40) 


 


 





 


Hemoglobin


 11.1 g/dL


(12.0-15.5) 


 


 





 


Hematocrit


 33.2 %


(36.0-47.0) 


 


 





 


Mean Corpuscular Volume 84 fL ()    


 


Mean Corpuscular Hemoglobin 28 pg (25-35)    


 


Mean Corpuscular Hemoglobin


Concent 33 g/dL


(31-37) 


 


 





 


Red Cell Distribution Width


 15.7 %


(11.5-14.5) 


 


 





 


Platelet Count


 208 x10^3/uL


(140-400) 


 


 





 


Neutrophils (%) (Auto) 38 % (31-73)    


 


Lymphocytes (%) (Auto) 25 % (24-48)    


 


Monocytes (%) (Auto) 33 % (0-9)    


 


Eosinophils (%) (Auto) 4 % (0-3)    


 


Basophils (%) (Auto) 0 % (0-3)    


 


Neutrophils # (Auto)


 0.6 x10^3/uL


(1.8-7.7) 


 


 





 


Lymphocytes # (Auto)


 0.4 x10^3/uL


(1.0-4.8) 


 


 





 


Monocytes # (Auto)


 0.5 x10^3/uL


(0.0-1.1) 


 


 





 


Eosinophils # (Auto)


 0.1 x10^3/uL


(0.0-0.7) 


 


 





 


Basophils # (Auto)


 0.0 x10^3/uL


(0.0-0.2) 


 


 





 


Sodium Level


 140 mmol/L


(136-145) 


 


 





 


Potassium Level


 2.5 mmol/L


(3.5-5.1) 


 


 





 


Chloride Level


 99 mmol/L


() 


 


 





 


Carbon Dioxide Level


 31 mmol/L


(21-32) 


 


 





 


Anion Gap 10 (6-14)    


 


Blood Urea Nitrogen 2 mg/dL (7-20)    


 


Creatinine


 0.6 mg/dL


(0.6-1.0) 


 


 





 


Estimated GFR


(Cockcroft-Gault) 102.7 


 


 


 





 


BUN/Creatinine Ratio 3 (6-20)    


 


Glucose Level


 223 mg/dL


(70-99) 


 


 





 


Calcium Level


 8.5 mg/dL


(8.5-10.1) 


 


 





 


Total Bilirubin


 0.6 mg/dL


(0.2-1.0) 


 


 





 


Aspartate Amino Transf


(AST/SGOT) 14 U/L (15-37) 


 


 


 





 


Alanine Aminotransferase


(ALT/SGPT) 20 U/L (14-59) 


 


 


 





 


Alkaline Phosphatase


 84 U/L


() 


 


 





 


Total Protein


 6.2 g/dL


(6.4-8.2) 


 


 





 


Albumin


 2.2 g/dL


(3.4-5.0) 


 


 





 


Albumin/Globulin Ratio 0.6 (1.0-1.7)    


 


Lipase


 133 U/L


() 


 


 





 


Glucose (Fingerstick)


 


 192 mg/dL


(70-99) 145 mg/dL


(70-99) 137 mg/dL


(70-99)


 


Test


 6/3/20


04:10 6/3/20


06:57 


 





 


Sodium Level


 141 mmol/L


(136-145) 


 


 





 


Potassium Level


 3.3 mmol/L


(3.5-5.1) 


 


 





 


Chloride Level


 100 mmol/L


() 


 


 





 


Carbon Dioxide Level


 28 mmol/L


(21-32) 


 


 





 


Anion Gap 13 (6-14)    


 


Blood Urea Nitrogen 3 mg/dL (7-20)    


 


Creatinine


 0.6 mg/dL


(0.6-1.0) 


 


 





 


Estimated GFR


(Cockcroft-Gault) 102.7 


 


 


 





 


Glucose Level


 171 mg/dL


(70-99) 


 


 





 


Calcium Level


 8.9 mg/dL


(8.5-10.1) 


 


 





 


Glucose (Fingerstick)


 


 153 mg/dL


(70-99) 


 











Laboratory Tests








Test


 6/2/20


11:15 6/2/20


16:28 6/2/20


20:38 6/3/20


04:10


 


Glucose (Fingerstick)


 192 mg/dL


(70-99) 145 mg/dL


(70-99) 137 mg/dL


(70-99) 





 


Sodium Level


 


 


 


 141 mmol/L


(136-145)


 


Potassium Level


 


 


 


 3.3 mmol/L


(3.5-5.1)


 


Chloride Level


 


 


 


 100 mmol/L


()


 


Carbon Dioxide Level


 


 


 


 28 mmol/L


(21-32)


 


Anion Gap    13 (6-14) 


 


Blood Urea Nitrogen    3 mg/dL (7-20) 


 


Creatinine


 


 


 


 0.6 mg/dL


(0.6-1.0)


 


Estimated GFR


(Cockcroft-Gault) 


 


 


 102.7 





 


Glucose Level


 


 


 


 171 mg/dL


(70-99)


 


Calcium Level


 


 


 


 8.9 mg/dL


(8.5-10.1)


 


Test


 6/3/20


06:57 


 


 





 


Glucose (Fingerstick)


 153 mg/dL


(70-99) 


 


 











Medications





Current Medications


Iohexol (Omnipaque 300 Mg/ml) 75 ml 1X  ONCE IV  Last administered on 5/26/20at 

11:30;  Start 5/26/20 at 11:30;  Stop 5/26/20 at 11:31;  Status DC


Iohexol (Omnipaque 240 Mg/ml) 50 ml 1X  ONCE PO  Last administered on 5/26/20at 

11:30;  Start 5/26/20 at 11:30;  Stop 5/26/20 at 11:31;  Status DC


Info (CONTRAST GIVEN -- Rx MONITORING) 1 each PRN DAILY  PRN MC SEE COMMENTS;  

Start 5/26/20 at 11:30;  Stop 5/28/20 at 11:29;  Status DC


Sodium Chloride 1,000 ml @  0 mls/hr 1X  ONCE IV  Last administered on 5/26/20at

12:27;  Start 5/26/20 at 12:15;  Stop 5/26/20 at 12:16;  Status DC


Morphine Sulfate (Morphine Sulfate) 5 mg 1X  ONCE IV  Last administered on 

5/26/20at 12:45;  Start 5/26/20 at 12:30;  Stop 5/26/20 at 12:33;  Status DC


Morphine Sulfate (Morphine Sulfate) 5 mg 1X  ONCE IV ;  Start 5/26/20 at 13:45; 

Stop 5/26/20 at 13:46;  Status DC


Morphine Sulfate (Morphine Sulfate) 4 mg PRN Q2HR  PRN IV MODERATE TO SEVERE 

PAIN Last administered on 5/28/20at 16:18;  Start 5/26/20 at 14:45;  Stop 6/1/20

at 10:23;  Status DC


Sodium Chloride 1,000 ml @  100 mls/hr Q10H IV  Last administered on 6/1/20at 

08:27;  Start 5/26/20 at 14:44;  Stop 6/1/20 at 10:23;  Status DC


Ondansetron HCl (Zofran) 4 mg PRN Q4HRS  PRN IV NAUSEA/VOMITING Last admin

istered on 6/2/20at 14:06;  Start 5/26/20 at 14:45


Acetaminophen (Tylenol Supp) 650 mg PRN Q4HRS  PRN IN TEMP OVER 100.4F OR MILD 

PAIN;  Start 5/26/20 at 14:45


Enoxaparin Sodium (Lovenox 40mg Syringe) 40 mg Q24H SQ  Last administered on 

6/2/20at 15:02;  Start 5/26/20 at 15:00


Insulin Human Lispro (HumaLOG) 0-9 UNITS Q4HRS SQ  Last administered on 

5/30/20at 18:15;  Start 5/26/20 at 16:00;  Stop 5/30/20 at 19:32;  Status DC


Dextrose (Dextrose 50%-Water Syringe) 12.5 gm PRN Q15MIN  PRN IV SEE COMMENTS;  

Start 5/26/20 at 14:45


Albuterol Sulfate (Ventolin Neb Soln) 3 mg PRN QID  PRN NEB SHORTNESS OF BREATH;

 Start 5/26/20 at 15:15


Sodium Chloride (Saline Mist Nasal) 1 néstor PRN Q1HR  PRN NS NASAL CONGESTION;  

Start 5/26/20 at 15:15


Methylprednisolone Sodium Succinate (SOLU-Medrol 40MG VIAL) 40 mg DAILY IV  Last

administered on 5/28/20at 08:21;  Start 5/26/20 at 15:15;  Stop 5/28/20 at 13:44

;  Status DC


Insulin Human Regular 100 unit/ Sodium Chloride 101 ml @ 0 mls/hr CONT  PRN IV 

SEE I/O RECORD Last administered on 5/27/20at 02:12;  Start 5/26/20 at 18:00


Fentanyl Citrate (Fentanyl 2ml Vial) 50 mcg PRN Q2HR  PRN IVP MOD TO SEVERE 

PAIN, 2ND CHOICE Last administered on 6/3/20at 06:26;  Start 5/27/20 at 11:45


Famotidine (Pepcid Vial) 20 mg QHS IVP  Last administered on 5/31/20at 22:14;  

Start 5/28/20 at 21:00;  Stop 6/1/20 at 10:21;  Status DC


Prednisone (Prednisone) 3 mg DAILY PO  Last administered on 6/1/20at 08:27;  

Start 5/29/20 at 09:00


Potassium Chloride/Water 100 ml @  100 mls/hr Q1H IV  Last administered on 5/30/ 20at 15:22;  Start 5/30/20 at 11:00;  Stop 5/30/20 at 14:59;  Status DC


Potassium Chloride (Klor-Con) 40 meq 1X  ONCE PO  Last administered on 5/30/20at

11:01;  Start 5/30/20 at 10:15;  Stop 5/30/20 at 10:22;  Status DC


Tramadol HCl (Ultram) 50 mg PRN Q6HRS  PRN PO MODERATE PAIN 4-6 Last 

administered on 6/2/20at 06:08;  Start 5/30/20 at 10:15


Oxycodone HCl (Roxicodone) 5 mg PRN Q6HRS  PRN PO SEVERE PAIN 7-10 Last 

administered on 5/30/20at 11:08;  Start 5/30/20 at 10:15;  Stop 5/30/20 at 

15:30;  Status DC


Magnesium Sulfate 100 ml @  25 mls/hr 1X  ONCE IV  Last administered on 

5/30/20at 11:06;  Start 5/30/20 at 11:00;  Stop 5/30/20 at 14:59;  Status DC


Magnesium Hydroxide (Milk Of Magnesia) 2,400 mg PRN DAILY  PRN PO CONSTIPATION 

2ND CHOICE;  Start 5/30/20 at 15:30


Psyllium Hydrophilic Mucilloid (Metamucil Fiber Packet) 1 pkt DAILY PO  Last 

administered on 6/1/20at 08:28;  Start 5/30/20 at 15:30


Polyethylene Glycol (miraLAX PACKET) 17 gm DAILY PO  Last administered on 

6/1/20at 08:28;  Start 5/30/20 at 15:30


Oxycodone HCl (Roxicodone) 5 mg PRN Q4HRS  PRN PO SEVERE PAIN 7-10 Last 

administered on 6/2/20at 03:41;  Start 5/30/20 at 15:30


Insulin Human Lispro (HumaLOG) 0-9 UNITS [id number] SQ  Last administered on 

6/1/20at 17:14;  Start 5/30/20 at 19:30


Famotidine (Pepcid) 20 mg QHS PO  Last administered on 6/1/20at 20:57;  Start 

6/1/20 at 21:00;  Stop 6/2/20 at 10:56;  Status DC


Iohexol (Omnipaque 300 Mg/ml) 75 ml 1X  ONCE IV  Last administered on 6/1/20at 

11:45;  Start 6/1/20 at 11:45;  Stop 6/1/20 at 11:46;  Status DC


Info (CONTRAST GIVEN -- Rx MONITORING) 1 each PRN DAILY  PRN MC SEE COMMENTS;  

Start 6/1/20 at 11:45;  Stop 6/3/20 at 11:44


Ringer's Solution 1,000 ml @  75 mls/hr [id number] IV  Last administered on 

6/3/20at 02:22;  Start 6/2/20 at 09:30


Potassium Chloride/Water 100 ml @  100 mls/hr Q1H IV  Last administered on 

6/2/20at 21:27;  Start 6/2/20 at 10:00;  Stop 6/2/20 at 21:59;  Status DC


Famotidine (Pepcid Vial) 20 mg QHS IVP  Last administered on 6/2/20at 21:28;  

Start 6/2/20 at 21:00


Bisacodyl (Dulcolax Supp) 10 mg PRN DAILY  PRN IN CONSTIPATION;  Start 6/2/20 at

11:00





Active Scripts


Active


Reported


Zyrtec (Cetirizine Hcl) 10 Mg Capsule 10 Mg PO DAILY


Methotrexate (Methotrexate Sodium) 2.5 Mg Tablet 8 Tab PO WEEKLY


Protonix  ** (Pantoprazole Sodium) 40 Mg Tablet.dr 40 Mg PO DAILYAC


Folic Acid 0.8 Mg Capsule 1 Cap PO DAILY 30 Days


Ferrous Sulfate 325 Mg Tablet 1 Tab PO DAILY


Vitals/I & O





Vital Sign - Last 24 Hours








 6/2/20 6/2/20 6/2/20 6/2/20





 10:52 11:59 12:57 15:00


 


Temp 98.0   98.0





 98.0   98.0


 


Pulse 94   90


 


Resp 17   17


 


B/P (MAP) 142/73 (96)   148/72 (97)


 


Pulse Ox 92   93


 


O2 Delivery  Room Air Room Air Room Air


 


    





    





 6/2/20 6/2/20 6/2/20 6/2/20





 16:08 19:01 19:11 19:31


 


Temp   98.4 





   98.4 


 


Pulse   98 


 


Resp   16 


 


B/P (MAP)   133/71 (91) 


 


Pulse Ox   96 


 


O2 Delivery Room Air Room Air Nasal Cannula Room Air


 


O2 Flow Rate   2.0 


 


    





    





 6/2/20 6/2/20 6/2/20 6/2/20





 20:00 21:28 22:00 23:20


 


Temp    98.5





    98.5


 


Pulse    90


 


Resp    16


 


B/P (MAP)    128/82 (97)


 


Pulse Ox    94


 


O2 Delivery Room Air Room Air Nasal Cannula Nasal Cannula


 


O2 Flow Rate   2.0 2.0


 


    





    





 6/3/20 6/3/20 6/3/20 6/3/20





 02:22 02:52 03:49 06:26


 


Temp   98.1 





   98.1 


 


Pulse   80 


 


Resp   18 


 


B/P (MAP)   128/76 (93) 


 


Pulse Ox   96 


 


O2 Delivery Room Air Room Air Nasal Cannula Room Air


 


O2 Flow Rate   2.0 


 


    





    





 6/3/20 6/3/20 6/3/20 





 07:00 07:10 08:00 


 


Temp 97.8   





 97.8   


 


Pulse 78   


 


Resp 18   


 


B/P (MAP) 134/67 (89)   


 


Pulse Ox 92   


 


O2 Delivery Room Air Room Air Room Air 











Hemodynamically unstable?:  No


Is patient in severe pain?:  No


Is NPO status required?:  No











ERWIN DUNLAP MD              Skyler 3, 2020 09:07

## 2020-06-03 NOTE — NUR
SW following. Discussed with RN, pt NPO and bowel rest. Possibility of starting TPN. Per 
chart, no surgical plans. SW will continue to follow.

## 2020-06-03 NOTE — CONS
DATE OF CONSULTATION:  06/03/2020



REQUESTING PHYSICIAN:  Dr. Valles.



REASON FOR CONSULTATION:  Pancreatitis and neutropenia.



HISTORY OF PRESENT ILLNESS:  This is a 59-year-old  female with a

history of adult onset Still's disease who is on methotrexate for at least 1

year.  The patient came in with nausea, vomiting and abdominal pain.  The

patient was found to have acute pancreatitis.  The patient has been getting

conservative therapy for that.  When she came in, her counts were normal.  White

count went down to now 1.6.  Platelets are normal.  The patient has no fever. 

The patient's abdominal pain has improved.  Yesterday, she had nausea one time,

but she has not had any nausea or vomiting.  She has not been started on any

diet yet.  Methotrexate has been stopped.  The patient is not receiving right

now any folic acid or folinic acid.



Last abdominal CT showed acute pancreatitis with no necrosis.  There is increase

in the fluid collection.  Small pleural effusion.



Currently, the patient is feeling much better.  Denies any headache, visual

symptoms, chest pain, shortness of breath, abdominal pain, urinary symptoms or

bowel symptoms.



PAST MEDICAL HISTORY:  Positive for:

1.  Adult onset Still's disease.

2.  She also has been diagnosed having celiac disease.

3.  Gastroesophageal reflux disease.

4.  Asthma.

5.  Anemia.



PAST SURGICAL HISTORY:  She has had:

1.  Cholecystectomy.

2.  Hysterectomy.

3.  Shoulder arthroscopy.



SOCIAL HISTORY:  Negative for smoking, alcohol or illicit drug use.



ALLERGIES:  No known drug allergies.



CURRENT MEDICATIONS:  Reviewed.



REVIEW OF SYSTEMS:  As per HPI.  All other systems reviewed and are negative.



PHYSICAL EXAMINATION:

GENERAL:  Alert, oriented female, not in distress.

VITAL SIGNS:  Stable, afebrile.

HEENT:  NAD.

NECK:  Supple.  No JVP.  No lymphadenopathy.

LUNGS:  Clear.

HEART:  S1, S2 regular.

ABDOMEN:  Soft, nontender.  No organomegaly.

EXTREMITIES:  No edema, cyanosis.

SKIN:  Unremarkable.

NEUROLOGIC:  The patient is alert, awake and appropriate.  No focal neurologic

deficit.



LABORATORY DATA:  White count is 1.6, hemoglobin 11.1, platelets are normal, 38%

neutrophil count.  BUN and creatinine are normal.  Her last lipase yesterday was

133.



Urinalysis is unremarkable.



Abdominal CT reviewed.



IMPRESSION:

1.  Acute pancreatitis, possibly from methotrexate or very high triglycerides, 
Methotrexate has been stopped. 

Acute pancreatitis appears to be stabilizing.

2.  Neutropenia secondary to methotrexate toxicity.  Needs to replace the

folinic acid.

4.  Adult-onset Still's disease.



RECOMMENDATIONS:  Recommend continue supportive care.  Diet as per the GI,

folinic acid and hopefully we will resume some liquid diet.  If she tolerates,

she should be able to get out of here.



Thank you very much, Dr. Valles, for giving me the opportunity to participate in

this patient's care.

 



______________________________

BUCKY BALLARD MD



DR:  BONG/faisal  JOB#:  [id number] / [id number]

DD:  06/03/2020 09:36  DT:  06/03/2020 10:05

CHRISTOPHE

## 2020-06-03 NOTE — PDOC
Infectious Disease Note


Vital Sign


Vital Signs





Vital Signs








  Date Time  Temp Pulse Resp B/P (MAP) Pulse Ox O2 Delivery O2 Flow Rate FiO2


 


6/3/20 08:00      Room Air  


 


6/3/20 07:00 97.8 78 18 134/67 (89) 92   





 97.8       


 


6/3/20 03:49       2.0 











Labs


Lab





Laboratory Tests








Test


 6/2/20


11:15 6/2/20


16:28 6/2/20


20:38 6/3/20


04:10


 


Glucose (Fingerstick)


 192 mg/dL


(70-99) 145 mg/dL


(70-99) 137 mg/dL


(70-99) 





 


Sodium Level


 


 


 


 141 mmol/L


(136-145)


 


Potassium Level


 


 


 


 3.3 mmol/L


(3.5-5.1)


 


Chloride Level


 


 


 


 100 mmol/L


()


 


Carbon Dioxide Level


 


 


 


 28 mmol/L


(21-32)


 


Anion Gap    13 (6-14) 


 


Blood Urea Nitrogen    3 mg/dL (7-20) 


 


Creatinine


 


 


 


 0.6 mg/dL


(0.6-1.0)


 


Estimated GFR


(Cockcroft-Gault) 


 


 


 102.7 





 


Glucose Level


 


 


 


 171 mg/dL


(70-99)


 


Calcium Level


 


 


 


 8.9 mg/dL


(8.5-10.1)


 


Test


 6/3/20


06:57 


 


 





 


Glucose (Fingerstick)


 153 mg/dL


(70-99) 


 


 














Objective


Assessment


pt seen, consult dictated





Plan


Plan of Care


/











BUCKY BALLARD MD                Skyler 3, 2020 09:20

## 2020-06-03 NOTE — PDOC
SURGICAL PROGRESS NOTE


Subjective


Pt reports feeling better


Vital Signs





Vital Signs








  Date Time  Temp Pulse Resp B/P (MAP) Pulse Ox O2 Delivery O2 Flow Rate FiO2


 


6/3/20 10:51 97.9 80 18 138/62 (87) 94 Room Air  





 97.9       


 


6/3/20 09:56       2.0 








General:  Alert, Oriented X3, Cooperative, No acute distress


Abdomen:  Soft, Other (mild TTP)


Labs





Laboratory Tests








Test


 6/1/20


16:17 6/1/20


21:11 6/2/20


06:55 6/2/20


08:50


 


Glucose (Fingerstick)


 183 mg/dL


(70-99) 126 mg/dL


(70-99) 152 mg/dL


(70-99) 





 


White Blood Count


 


 


 


 1.6 x10^3/uL


(4.0-11.0)


 


Red Blood Count


 


 


 


 3.96 x10^6/uL


(3.50-5.40)


 


Hemoglobin


 


 


 


 11.1 g/dL


(12.0-15.5)


 


Hematocrit


 


 


 


 33.2 %


(36.0-47.0)


 


Mean Corpuscular Volume    84 fL () 


 


Mean Corpuscular Hemoglobin    28 pg (25-35) 


 


Mean Corpuscular Hemoglobin


Concent 


 


 


 33 g/dL


(31-37)


 


Red Cell Distribution Width


 


 


 


 15.7 %


(11.5-14.5)


 


Platelet Count


 


 


 


 208 x10^3/uL


(140-400)


 


Neutrophils (%) (Auto)    38 % (31-73) 


 


Lymphocytes (%) (Auto)    25 % (24-48) 


 


Monocytes (%) (Auto)    33 % (0-9) 


 


Eosinophils (%) (Auto)    4 % (0-3) 


 


Basophils (%) (Auto)    0 % (0-3) 


 


Neutrophils # (Auto)


 


 


 


 0.6 x10^3/uL


(1.8-7.7)


 


Lymphocytes # (Auto)


 


 


 


 0.4 x10^3/uL


(1.0-4.8)


 


Monocytes # (Auto)


 


 


 


 0.5 x10^3/uL


(0.0-1.1)


 


Eosinophils # (Auto)


 


 


 


 0.1 x10^3/uL


(0.0-0.7)


 


Basophils # (Auto)


 


 


 


 0.0 x10^3/uL


(0.0-0.2)


 


Sodium Level


 


 


 


 140 mmol/L


(136-145)


 


Potassium Level


 


 


 


 2.5 mmol/L


(3.5-5.1)


 


Chloride Level


 


 


 


 99 mmol/L


()


 


Carbon Dioxide Level


 


 


 


 31 mmol/L


(21-32)


 


Anion Gap    10 (6-14) 


 


Blood Urea Nitrogen    2 mg/dL (7-20) 


 


Creatinine


 


 


 


 0.6 mg/dL


(0.6-1.0)


 


Estimated GFR


(Cockcroft-Gault) 


 


 


 102.7 





 


BUN/Creatinine Ratio    3 (6-20) 


 


Glucose Level


 


 


 


 223 mg/dL


(70-99)


 


Calcium Level


 


 


 


 8.5 mg/dL


(8.5-10.1)


 


Total Bilirubin


 


 


 


 0.6 mg/dL


(0.2-1.0)


 


Aspartate Amino Transf


(AST/SGOT) 


 


 


 14 U/L (15-37) 





 


Alanine Aminotransferase


(ALT/SGPT) 


 


 


 20 U/L (14-59) 





 


Alkaline Phosphatase


 


 


 


 84 U/L


()


 


Total Protein


 


 


 


 6.2 g/dL


(6.4-8.2)


 


Albumin


 


 


 


 2.2 g/dL


(3.4-5.0)


 


Albumin/Globulin Ratio    0.6 (1.0-1.7) 


 


Lipase


 


 


 


 133 U/L


()


 


Test


 6/2/20


11:15 6/2/20


16:28 6/2/20


20:38 6/3/20


04:10


 


Glucose (Fingerstick)


 192 mg/dL


(70-99) 145 mg/dL


(70-99) 137 mg/dL


(70-99) 





 


Sodium Level


 


 


 


 141 mmol/L


(136-145)


 


Potassium Level


 


 


 


 3.3 mmol/L


(3.5-5.1)


 


Chloride Level


 


 


 


 100 mmol/L


()


 


Carbon Dioxide Level


 


 


 


 28 mmol/L


(21-32)


 


Anion Gap    13 (6-14) 


 


Blood Urea Nitrogen    3 mg/dL (7-20) 


 


Creatinine


 


 


 


 0.6 mg/dL


(0.6-1.0)


 


Estimated GFR


(Cockcroft-Gault) 


 


 


 102.7 





 


Glucose Level


 


 


 


 171 mg/dL


(70-99)


 


Calcium Level


 


 


 


 8.9 mg/dL


(8.5-10.1)


 


Test


 6/3/20


06:57 6/3/20


11:25 


 





 


Glucose (Fingerstick)


 153 mg/dL


(70-99) 145 mg/dL


(70-99) 


 











Laboratory Tests








Test


 6/2/20


16:28 6/2/20


20:38 6/3/20


04:10 6/3/20


06:57


 


Glucose (Fingerstick)


 145 mg/dL


(70-99) 137 mg/dL


(70-99) 


 153 mg/dL


(70-99)


 


Sodium Level


 


 


 141 mmol/L


(136-145) 





 


Potassium Level


 


 


 3.3 mmol/L


(3.5-5.1) 





 


Chloride Level


 


 


 100 mmol/L


() 





 


Carbon Dioxide Level


 


 


 28 mmol/L


(21-32) 





 


Anion Gap   13 (6-14)  


 


Blood Urea Nitrogen   3 mg/dL (7-20)  


 


Creatinine


 


 


 0.6 mg/dL


(0.6-1.0) 





 


Estimated GFR


(Cockcroft-Gault) 


 


 102.7 


 





 


Glucose Level


 


 


 171 mg/dL


(70-99) 





 


Calcium Level


 


 


 8.9 mg/dL


(8.5-10.1) 





 


Test


 6/3/20


11:25 


 


 





 


Glucose (Fingerstick)


 145 mg/dL


(70-99) 


 


 











Problem List


Problems


Medical Problems:


(1) Dehydration


Status: Acute  





(2) Hyperglycemia


Status: Acute  





(3) Pancreatitis, acute


Status: Acute  








Assessment/Plan


pancreatitis


cont supportive care


no current surgical plans.





Justicifation of Admission Dx:


Justifications for Admission:


Justification of Admission Dx:  Yes











PAYAL HAYDEN MD              Skyler 3, 2020 14:00

## 2020-06-04 VITALS — SYSTOLIC BLOOD PRESSURE: 139 MMHG | DIASTOLIC BLOOD PRESSURE: 70 MMHG

## 2020-06-04 VITALS — SYSTOLIC BLOOD PRESSURE: 160 MMHG | DIASTOLIC BLOOD PRESSURE: 78 MMHG

## 2020-06-04 VITALS — SYSTOLIC BLOOD PRESSURE: 147 MMHG | DIASTOLIC BLOOD PRESSURE: 94 MMHG

## 2020-06-04 VITALS — SYSTOLIC BLOOD PRESSURE: 158 MMHG | DIASTOLIC BLOOD PRESSURE: 82 MMHG

## 2020-06-04 VITALS — DIASTOLIC BLOOD PRESSURE: 78 MMHG | SYSTOLIC BLOOD PRESSURE: 132 MMHG

## 2020-06-04 VITALS — DIASTOLIC BLOOD PRESSURE: 76 MMHG | SYSTOLIC BLOOD PRESSURE: 129 MMHG

## 2020-06-04 LAB
ANION GAP SERPL CALC-SCNC: 18 MMOL/L (ref 6–14)
BASOPHILS # BLD AUTO: 0 X10^3/UL (ref 0–0.2)
BASOPHILS NFR BLD: 1 % (ref 0–3)
BUN SERPL-MCNC: 4 MG/DL (ref 7–20)
CALCIUM SERPL-MCNC: 8.6 MG/DL (ref 8.5–10.1)
CHLORIDE SERPL-SCNC: 100 MMOL/L (ref 98–107)
CO2 SERPL-SCNC: 22 MMOL/L (ref 21–32)
CREAT SERPL-MCNC: 0.6 MG/DL (ref 0.6–1)
EOSINOPHIL NFR BLD: 0 X10^3/UL (ref 0–0.7)
EOSINOPHIL NFR BLD: 2 % (ref 0–3)
ERYTHROCYTE [DISTWIDTH] IN BLOOD BY AUTOMATED COUNT: 15.5 % (ref 11.5–14.5)
GFR SERPLBLD BASED ON 1.73 SQ M-ARVRAT: 102.7 ML/MIN
GLUCOSE SERPL-MCNC: 166 MG/DL (ref 70–99)
HCT VFR BLD CALC: 35.6 % (ref 36–47)
HGB BLD-MCNC: 11.7 G/DL (ref 12–15.5)
LYMPHOCYTES # BLD: 0.6 X10^3/UL (ref 1–4.8)
LYMPHOCYTES NFR BLD AUTO: 37 % (ref 24–48)
MAGNESIUM SERPL-MCNC: 1.6 MG/DL (ref 1.8–2.4)
MCH RBC QN AUTO: 28 PG (ref 25–35)
MCHC RBC AUTO-ENTMCNC: 33 G/DL (ref 31–37)
MCV RBC AUTO: 85 FL (ref 79–100)
MONO #: 0.4 X10^3/UL (ref 0–1.1)
MONOCYTES NFR BLD: 28 % (ref 0–9)
NEUT #: 0.5 X10^3/UL (ref 1.8–7.7)
NEUTROPHILS NFR BLD AUTO: 32 % (ref 31–73)
PHOSPHATE SERPL-MCNC: 4.1 MG/DL (ref 2.6–4.7)
PLATELET # BLD AUTO: 280 X10^3/UL (ref 140–400)
POTASSIUM SERPL-SCNC: 3.5 MMOL/L (ref 3.5–5.1)
PROTHROMBIN TIME: 13.6 SEC (ref 11.7–14)
RBC # BLD AUTO: 4.22 X10^6/UL (ref 3.5–5.4)
SODIUM SERPL-SCNC: 140 MMOL/L (ref 136–145)
WBC # BLD AUTO: 1.6 X10^3/UL (ref 4–11)

## 2020-06-04 PROCEDURE — B5181ZA FLUOROSCOPY OF SUPERIOR VENA CAVA USING LOW OSMOLAR CONTRAST, GUIDANCE: ICD-10-PCS | Performed by: RADIOLOGY

## 2020-06-04 PROCEDURE — B548ZZA ULTRASONOGRAPHY OF SUPERIOR VENA CAVA, GUIDANCE: ICD-10-PCS | Performed by: RADIOLOGY

## 2020-06-04 PROCEDURE — 02HV33Z INSERTION OF INFUSION DEVICE INTO SUPERIOR VENA CAVA, PERCUTANEOUS APPROACH: ICD-10-PCS | Performed by: RADIOLOGY

## 2020-06-04 RX ADMIN — POLYETHYLENE GLYCOL 3350 SCH GM: 17 POWDER, FOR SOLUTION ORAL at 08:56

## 2020-06-04 RX ADMIN — FENTANYL CITRATE PRN MCG: 50 INJECTION INTRAMUSCULAR; INTRAVENOUS at 16:21

## 2020-06-04 RX ADMIN — SODIUM CHLORIDE, SODIUM LACTATE, POTASSIUM CHLORIDE, AND CALCIUM CHLORIDE SCH MLS/HR: .6; .31; .03; .02 INJECTION, SOLUTION INTRAVENOUS at 01:53

## 2020-06-04 RX ADMIN — LEUCOVORIN CALCIUM SCH MG: 5 TABLET ORAL at 11:13

## 2020-06-04 RX ADMIN — LEUCOVORIN CALCIUM SCH MG: 5 TABLET ORAL at 16:25

## 2020-06-04 RX ADMIN — INSULIN LISPRO SCH UNITS: 100 INJECTION, SOLUTION INTRAVENOUS; SUBCUTANEOUS at 17:00

## 2020-06-04 RX ADMIN — SODIUM CHLORIDE, SODIUM LACTATE, POTASSIUM CHLORIDE, AND CALCIUM CHLORIDE SCH MLS/HR: .6; .31; .03; .02 INJECTION, SOLUTION INTRAVENOUS at 14:50

## 2020-06-04 RX ADMIN — ENOXAPARIN SODIUM SCH MG: 40 INJECTION SUBCUTANEOUS at 16:19

## 2020-06-04 RX ADMIN — POTASSIUM CHLORIDE SCH MLS/HR: 7.46 INJECTION, SOLUTION INTRAVENOUS at 01:53

## 2020-06-04 RX ADMIN — FENTANYL CITRATE PRN MCG: 50 INJECTION INTRAMUSCULAR; INTRAVENOUS at 08:21

## 2020-06-04 RX ADMIN — FENTANYL CITRATE PRN MCG: 50 INJECTION INTRAMUSCULAR; INTRAVENOUS at 03:50

## 2020-06-04 RX ADMIN — INSULIN LISPRO SCH UNITS: 100 INJECTION, SOLUTION INTRAVENOUS; SUBCUTANEOUS at 21:00

## 2020-06-04 RX ADMIN — LEUCOVORIN CALCIUM SCH MG: 5 TABLET ORAL at 22:10

## 2020-06-04 RX ADMIN — FENTANYL CITRATE PRN MCG: 50 INJECTION INTRAMUSCULAR; INTRAVENOUS at 11:42

## 2020-06-04 RX ADMIN — INSULIN LISPRO SCH UNITS: 100 INJECTION, SOLUTION INTRAVENOUS; SUBCUTANEOUS at 11:45

## 2020-06-04 RX ADMIN — Medication PRN EACH: at 14:42

## 2020-06-04 RX ADMIN — FENTANYL CITRATE PRN MCG: 50 INJECTION INTRAMUSCULAR; INTRAVENOUS at 00:34

## 2020-06-04 RX ADMIN — Medication PRN EACH: at 15:12

## 2020-06-04 RX ADMIN — INSULIN LISPRO SCH UNITS: 100 INJECTION, SOLUTION INTRAVENOUS; SUBCUTANEOUS at 08:00

## 2020-06-04 RX ADMIN — PSYLLIUM HUSK SCH PKT: 3.4 POWDER ORAL at 08:56

## 2020-06-04 RX ADMIN — FAMOTIDINE SCH MG: 10 INJECTION, SOLUTION INTRAVENOUS at 22:10

## 2020-06-04 NOTE — NUR
SW following. Discussed with RN. Pt getting PICC line for anticipated TPN today. No surgical 
plans per chart. SW will continue to follow.

## 2020-06-04 NOTE — NUR
Pharmacy TPN Dosing Note



S: EARL TIDWELL is a 59 year old F Currently receiving Central Continuous TPN started 
06/04/20



B:Pertinent PMH: Pancreatitis, NPO

Height: 5 feet, 6 inches

Weight: 67.0 kg



Current diet: NPO 



LABS:

Sodium:    140 

Potassium: 3.5 

Chloride:  100 

Calcium:   8.6 

Corrected Calcium: 10.04 

Magnesium: 1.8 

CO2:       22 

SCr:       0.6 

Glucose:   127 

Albumin:   2.2 

AST:       14 

ALT:       20 



TPN FORMULA:

TPN TYPE:  Central Continuous

AMINO ACIDS:         60 gm

DEXTROSE:            195 gm

LIPIDS:              20 gm

SODIUM CHLORIDE:     90 mEq

POTASSIUM CHLORIDE:  50 mEq

POTASSIUM PHOSPHATE: 13.6 mmol

MAGNESIUM:           10 mEq

CALCIUM:             10 mEq

MULTIPLE VITAMIN:    10 ml

TRACE ELEMENTS:      1 ml ml(s)



TPN PLAN:  

Start standard TPN.





R: Begin TPN 

Will monitor electrolytes, glucose, and tolerance to TPN.



 Mikey Seth, [facility], 06/04/20 0228

## 2020-06-04 NOTE — PDOC
Infectious Disease Note


Subjective


Subjective


Patient is feeling better.  Walking around.





ROS


ROS


No nausea vomiting diarrhea chest pain shortness of breath





Vital Sign


Vital Signs





Vital Signs








  Date Time  Temp Pulse Resp B/P (MAP) Pulse Ox O2 Delivery O2 Flow Rate FiO2


 


6/4/20 08:55      Room Air  


 


6/4/20 07:00 98.7 80 18 129/76 (93) 95   





 98.7       


 


6/3/20 21:30       2.0 











Physical Exam


PHYSICAL EXAM


GENERAL:  Alert, oriented female, not in distress.


VITAL SIGNS:  Stable, afebrile.


HEENT:  NAD.


NECK:  Supple.  No JVP.  No lymphadenopathy.


LUNGS:  Clear.


HEART:  S1, S2 regular.


ABDOMEN:  Soft, nontender.  No organomegaly.


EXTREMITIES:  No edema, cyanosis.


SKIN:  Unremarkable.


NEUROLOGIC:  The patient is alert, awake and appropriate.  No focal neurologic


deficit.





Labs


Lab





Laboratory Tests








Test


 6/3/20


11:25 6/3/20


16:43 6/3/20


20:21 6/4/20


04:05


 


Glucose (Fingerstick)


 145 mg/dL


(70-99) 132 mg/dL


(70-99) 148 mg/dL


(70-99) 





 


White Blood Count


 


 


 


 1.6 x10^3/uL


(4.0-11.0)


 


Red Blood Count


 


 


 


 4.22 x10^6/uL


(3.50-5.40)


 


Hemoglobin


 


 


 


 11.7 g/dL


(12.0-15.5)


 


Hematocrit


 


 


 


 35.6 %


(36.0-47.0)


 


Mean Corpuscular Volume    85 fL () 


 


Mean Corpuscular Hemoglobin    28 pg (25-35) 


 


Mean Corpuscular Hemoglobin


Concent 


 


 


 33 g/dL


(31-37)


 


Red Cell Distribution Width


 


 


 


 15.5 %


(11.5-14.5)


 


Platelet Count


 


 


 


 280 x10^3/uL


(140-400)


 


Neutrophils (%) (Auto)    32 % (31-73) 


 


Lymphocytes (%) (Auto)    37 % (24-48) 


 


Monocytes (%) (Auto)    28 % (0-9) 


 


Eosinophils (%) (Auto)    2 % (0-3) 


 


Basophils (%) (Auto)    1 % (0-3) 


 


Neutrophils # (Auto)


 


 


 


 0.5 x10^3/uL


(1.8-7.7)


 


Lymphocytes # (Auto)


 


 


 


 0.6 x10^3/uL


(1.0-4.8)


 


Monocytes # (Auto)


 


 


 


 0.4 x10^3/uL


(0.0-1.1)


 


Eosinophils # (Auto)


 


 


 


 0.0 x10^3/uL


(0.0-0.7)


 


Basophils # (Auto)


 


 


 


 0.0 x10^3/uL


(0.0-0.2)


 


Prothrombin Time


 


 


 


 13.6 SEC


(11.7-14.0)


 


Prothromb Time International


Ratio 


 


 


 1.1 (0.8-1.1) 





 


Sodium Level


 


 


 


 140 mmol/L


(136-145)


 


Potassium Level


 


 


 


 3.5 mmol/L


(3.5-5.1)


 


Chloride Level


 


 


 


 100 mmol/L


()


 


Carbon Dioxide Level


 


 


 


 22 mmol/L


(21-32)


 


Anion Gap    18 (6-14) 


 


Blood Urea Nitrogen    4 mg/dL (7-20) 


 


Creatinine


 


 


 


 0.6 mg/dL


(0.6-1.0)


 


Estimated GFR


(Cockcroft-Gault) 


 


 


 102.7 





 


Glucose Level


 


 


 


 166 mg/dL


(70-99)


 


Calcium Level


 


 


 


 8.6 mg/dL


(8.5-10.1)


 


Test


 6/4/20


07:31 


 


 





 


Glucose (Fingerstick)


 146 mg/dL


(70-99) 


 


 














Objective


Assessment


1.  Acute pancreatitis, possibly from methotrexate or very high triglycerides, 

Methotrexate has been stopped. 


Acute pancreatitis appears to be stabilizing.


2.  Neutropenia secondary to methotrexate toxicity.  Needs to replace the


folinic acid.


4.  Adult-onset Still's disease.





Plan


Plan of Care


Continue folinic acid dose increase after discussing with pharmacy


Continue supportive care


N.p.o.


We will monitor WBC











BUCKY BALLARD MD                Jun 4, 2020 09:24

## 2020-06-04 NOTE — RAD
Exam: Fluoroscopic and ultrasound guided right percutaneous inserted central

venous catheter placement 6/4/2020 1:58 PM



.Indication: TPN



Technique: Informed oral and written consent were obtained. The right upper

extremity was prepped and draped using sterile barrier technique. All elements

of maximal sterile barrier technique including the use of a cap, mask, sterile

gown, sterile gloves, large sterile sheet, appropriate hand hygiene, and 2%

chlorhexidine for cutaneous antisepsis (or acceptable alternative antiseptic

per current guidelines) were followed for this procedure..



Real-time ultrasound demonstrated a patent right basilic vein which was 

prepped and draped in usual sterile fashion. 1% lidocaine used for local

anesthesia. Using real-time ultrasound guidance the access needle

percutaneously punctured the selected right basilic vein. Reference ultrasound

images were saved to the medical record.



A guidewire was advanced through the needle to the cavoatrial junction, and a

peel-away sheath placed.   The catheter was cut to length and inserted through

the peel-away sheath such that its tip is at the cavoatrial junction. The wire

and sheath were removed, and the catheter secured in place, and a sterile

dressing was applied. Catheter was found to flush and aspirate normally. No

immediate complications are identified.



FLUORO TIME:  0.2 min

DOSE AREA PRODUCT:  0.2 Gycm2



 Impression: Ultrasound and fluoroscopically guided placement of a right upper

extremity PICC line.

## 2020-06-04 NOTE — PDOC
SURGICAL PROGRESS NOTE


Subjective


Patient doing quite well this morning no complaints no nausea no pain


Vital Signs





Vital Signs








  Date Time  Temp Pulse Resp B/P (MAP) Pulse Ox O2 Delivery O2 Flow Rate FiO2


 


6/4/20 07:00 98.7 80 18 129/76 (93) 95 Room Air  





 98.7       


 


6/3/20 21:30       2.0 








PATIENT HAS A MULLIGAN:  No


General:  Alert, Oriented X3, Cooperative, No acute distress


Abdomen:  Normal bowel sounds, Soft, No tenderness


Labs





Laboratory Tests








Test


 6/2/20


08:50 6/2/20


11:15 6/2/20


16:28 6/2/20


20:38


 


White Blood Count


 1.6 x10^3/uL


(4.0-11.0) 


 


 





 


Red Blood Count


 3.96 x10^6/uL


(3.50-5.40) 


 


 





 


Hemoglobin


 11.1 g/dL


(12.0-15.5) 


 


 





 


Hematocrit


 33.2 %


(36.0-47.0) 


 


 





 


Mean Corpuscular Volume 84 fL ()    


 


Mean Corpuscular Hemoglobin 28 pg (25-35)    


 


Mean Corpuscular Hemoglobin


Concent 33 g/dL


(31-37) 


 


 





 


Red Cell Distribution Width


 15.7 %


(11.5-14.5) 


 


 





 


Platelet Count


 208 x10^3/uL


(140-400) 


 


 





 


Neutrophils (%) (Auto) 38 % (31-73)    


 


Lymphocytes (%) (Auto) 25 % (24-48)    


 


Monocytes (%) (Auto) 33 % (0-9)    


 


Eosinophils (%) (Auto) 4 % (0-3)    


 


Basophils (%) (Auto) 0 % (0-3)    


 


Neutrophils # (Auto)


 0.6 x10^3/uL


(1.8-7.7) 


 


 





 


Lymphocytes # (Auto)


 0.4 x10^3/uL


(1.0-4.8) 


 


 





 


Monocytes # (Auto)


 0.5 x10^3/uL


(0.0-1.1) 


 


 





 


Eosinophils # (Auto)


 0.1 x10^3/uL


(0.0-0.7) 


 


 





 


Basophils # (Auto)


 0.0 x10^3/uL


(0.0-0.2) 


 


 





 


Sodium Level


 140 mmol/L


(136-145) 


 


 





 


Potassium Level


 2.5 mmol/L


(3.5-5.1) 


 


 





 


Chloride Level


 99 mmol/L


() 


 


 





 


Carbon Dioxide Level


 31 mmol/L


(21-32) 


 


 





 


Anion Gap 10 (6-14)    


 


Blood Urea Nitrogen 2 mg/dL (7-20)    


 


Creatinine


 0.6 mg/dL


(0.6-1.0) 


 


 





 


Estimated GFR


(Cockcroft-Gault) 102.7 


 


 


 





 


BUN/Creatinine Ratio 3 (6-20)    


 


Glucose Level


 223 mg/dL


(70-99) 


 


 





 


Calcium Level


 8.5 mg/dL


(8.5-10.1) 


 


 





 


Total Bilirubin


 0.6 mg/dL


(0.2-1.0) 


 


 





 


Aspartate Amino Transf


(AST/SGOT) 14 U/L (15-37) 


 


 


 





 


Alanine Aminotransferase


(ALT/SGPT) 20 U/L (14-59) 


 


 


 





 


Alkaline Phosphatase


 84 U/L


() 


 


 





 


Total Protein


 6.2 g/dL


(6.4-8.2) 


 


 





 


Albumin


 2.2 g/dL


(3.4-5.0) 


 


 





 


Albumin/Globulin Ratio 0.6 (1.0-1.7)    


 


Lipase


 133 U/L


() 


 


 





 


Glucose (Fingerstick)


 


 192 mg/dL


(70-99) 145 mg/dL


(70-99) 137 mg/dL


(70-99)


 


Test


 6/3/20


04:10 6/3/20


06:57 6/3/20


11:25 6/3/20


16:43


 


Sodium Level


 141 mmol/L


(136-145) 


 


 





 


Potassium Level


 3.3 mmol/L


(3.5-5.1) 


 


 





 


Chloride Level


 100 mmol/L


() 


 


 





 


Carbon Dioxide Level


 28 mmol/L


(21-32) 


 


 





 


Anion Gap 13 (6-14)    


 


Blood Urea Nitrogen 3 mg/dL (7-20)    


 


Creatinine


 0.6 mg/dL


(0.6-1.0) 


 


 





 


Estimated GFR


(Cockcroft-Gault) 102.7 


 


 


 





 


Glucose Level


 171 mg/dL


(70-99) 


 


 





 


Calcium Level


 8.9 mg/dL


(8.5-10.1) 


 


 





 


Glucose (Fingerstick)


 


 153 mg/dL


(70-99) 145 mg/dL


(70-99) 132 mg/dL


(70-99)


 


Test


 6/3/20


20:21 6/4/20


04:05 6/4/20


07:31 





 


Glucose (Fingerstick)


 148 mg/dL


(70-99) 


 146 mg/dL


(70-99) 





 


Sodium Level


 


 140 mmol/L


(136-145) 


 





 


Potassium Level


 


 3.5 mmol/L


(3.5-5.1) 


 





 


Chloride Level


 


 100 mmol/L


() 


 





 


Carbon Dioxide Level


 


 22 mmol/L


(21-32) 


 





 


Anion Gap  18 (6-14)   


 


Blood Urea Nitrogen  4 mg/dL (7-20)   


 


Creatinine


 


 0.6 mg/dL


(0.6-1.0) 


 





 


Estimated GFR


(Cockcroft-Gault) 


 102.7 


 


 





 


Glucose Level


 


 166 mg/dL


(70-99) 


 





 


Calcium Level


 


 8.6 mg/dL


(8.5-10.1) 


 











Laboratory Tests








Test


 6/3/20


11:25 6/3/20


16:43 6/3/20


20:21 6/4/20


04:05


 


Glucose (Fingerstick)


 145 mg/dL


(70-99) 132 mg/dL


(70-99) 148 mg/dL


(70-99) 





 


Sodium Level


 


 


 


 140 mmol/L


(136-145)


 


Potassium Level


 


 


 


 3.5 mmol/L


(3.5-5.1)


 


Chloride Level


 


 


 


 100 mmol/L


()


 


Carbon Dioxide Level


 


 


 


 22 mmol/L


(21-32)


 


Anion Gap    18 (6-14) 


 


Blood Urea Nitrogen    4 mg/dL (7-20) 


 


Creatinine


 


 


 


 0.6 mg/dL


(0.6-1.0)


 


Estimated GFR


(Cockcroft-Gault) 


 


 


 102.7 





 


Glucose Level


 


 


 


 166 mg/dL


(70-99)


 


Calcium Level


 


 


 


 8.6 mg/dL


(8.5-10.1)


 


Test


 6/4/20


07:31 


 


 





 


Glucose (Fingerstick)


 146 mg/dL


(70-99) 


 


 











Problem List


Problems


Medical Problems:


(1) Dehydration


Status: Acute  





(2) Hyperglycemia


Status: Acute  





(3) Pancreatitis, acute


Status: Acute  








Assessment/Plan


Pancreatitis no surgical plans


Continue medical therapy





Justicifation of Admission Dx:


Justifications for Admission:


Justification of Admission Dx:  Yes











PRITI DONNELLY MD              Jun 4, 2020 08:09

## 2020-06-04 NOTE — PDOC
Subjective:


Subjective:


Worsening pain in left abdomen today, also sees a lump there.


Has been walking, pain meds help.





Objective:


Objective:


D/w nurse - pt has questions about TPN, says no one talked w/ her - I explained 

rationale for TPN and discussed PICC procedure w/ her yesterday. 


D/w Dr. Schrader.


Vital Signs:





                                   Vital Signs








  Date Time  Temp Pulse Resp B/P (MAP) Pulse Ox O2 Delivery O2 Flow Rate FiO2


 


6/4/20 08:55      Room Air  


 


6/4/20 07:00 98.7 80 18 129/76 (93) 95   





 98.7       


 


6/3/20 21:30       2.0 








Labs:





Laboratory Tests








Test


 6/3/20


11:25 6/3/20


16:43 6/3/20


20:21 6/4/20


04:05


 


Glucose (Fingerstick) 145 mg/dL  132 mg/dL  148 mg/dL  


 


White Blood Count    1.6 x10^3/uL 


 


Red Blood Count    4.22 x10^6/uL 


 


Hemoglobin    11.7 g/dL 


 


Hematocrit    35.6 % 


 


Mean Corpuscular Volume    85 fL 


 


Mean Corpuscular Hemoglobin    28 pg 


 


Mean Corpuscular Hemoglobin


Concent 


 


 


 33 g/dL 





 


Red Cell Distribution Width    15.5 % 


 


Platelet Count    280 x10^3/uL 


 


Neutrophils (%) (Auto)    32 % 


 


Lymphocytes (%) (Auto)    37 % 


 


Monocytes (%) (Auto)    28 % 


 


Eosinophils (%) (Auto)    2 % 


 


Basophils (%) (Auto)    1 % 


 


Neutrophils # (Auto)    0.5 x10^3/uL 


 


Lymphocytes # (Auto)    0.6 x10^3/uL 


 


Monocytes # (Auto)    0.4 x10^3/uL 


 


Eosinophils # (Auto)    0.0 x10^3/uL 


 


Basophils # (Auto)    0.0 x10^3/uL 


 


Prothrombin Time    13.6 SEC 


 


Prothromb Time International


Ratio 


 


 


 1.1 





 


Sodium Level    140 mmol/L 


 


Potassium Level    3.5 mmol/L 


 


Chloride Level    100 mmol/L 


 


Carbon Dioxide Level    22 mmol/L 


 


Anion Gap    18 


 


Blood Urea Nitrogen    4 mg/dL 


 


Creatinine    0.6 mg/dL 


 


Estimated GFR


(Cockcroft-Gault) 


 


 


 102.7 





 


Glucose Level    166 mg/dL 


 


Calcium Level    8.6 mg/dL 


 


Test


 6/4/20


07:31 


 


 





 


Glucose (Fingerstick) 146 mg/dL    











PE:





GEN: NAD - walking halls, then went back to room


LUNGS: CTAB


HEART: RRR


ABD: soft - slight bulge on both side of abdomen - left might be more pronounced

- soft, not particularly tender


NEURO/PSYCH: A & O 3





A/P:


Pancreatitis, hypertriglyceridemia - interval CT 6/1 worse


Still's disease w/ h/o methotrexate and prednisone use, leukopenia





--


Again explained PICC/TPN rationale and her questions were answered to her 

satisfaction.


Ordered yesterday, hopefully will be placed/started today.





Justicifation of Admission Dx:


Justifications for Admission:


Justification of Admission Dx:  Yes











AILYN OWEN          Jun 4, 2020 10:09

## 2020-06-04 NOTE — PDOC
PROGRESS NOTES


Chief Complaint


Chief Complaint


impression 








Acute pancreatitis - likely 2/2 hyper-triglyceridemia.  IgG4 RD less likely 

given level of 72 mg/DL and chronic immunosuppression on methotrexate and p

rednisone.  I discussed with [facility] rheumatology concern that MTX could play a 

role as well.  Hold dosing for leukopenia


 6/2 ct Progression of findings of acute pancreatitis. Pancreatic enhancement  

pattern is slightly heterogeneous but no definable nonenhancing component to 

suggest liquefactive necrosis at this time. In addition, there is been 

substantial increase in left lateral peripancreatic fluid which extends 


laterally to the left in the region of the lateral conal and renal fascia,and 

dissects distally into the left pelvis. This could be infected fluid, but does 

not appear to be an encapsulated collection.


Duodenal and jejunal intraluminal lipomas


Hypertriglyceridemia - will need outpatient Endocrinology referral for 

cholesterol metabolism treatment, I have suggested prescription 2g TID fish oil 

(Vascepa or Lovaza) in the meantime once pancreatitis resolves


Hyponatremia


DM2 with Hyperglycemia - A1c 13.6. Should continue on insulin therapy until A1c 

< 8, then consider PO options. Would probably have to avoid GLP1 therapy given 

her pancreatitis.  Would defer to an endocrinologist for this.


Celiac disease 


Adult onset stills disease


Anemia


Asthma


NATO on CPAP


GERD 


Leukopenia -likely related to chronic immunosuppression will hold MTX therapy 

for now and change to a prednisone taper on discharge.











FEN - LR for maintenance, NPO, will wait for recommendations from our consultant

regarding diet


PPX - lovenox


FULL CODE


DIspo - inpatient for pancreatitis recovery


TPN until pain/nausea resolve with worsening pancreatitis. 











39 min pt exam, chart review, > 50% of time spent with exam, chart review, pt 

care coordination





History of Present Illness


History of Present Illness


Ms Almanza is a 58yo F [employer]  w/ PMHx celiac disease, adult onset stills 

disease, Anemia, Asthma, NATO on CPAP, GERD who presents with severe upper 

abdominal pain, nausea, vomiting, fatigue. She states this started suddenly this

morning while she was working from home on teleconference (works as a 

on the  base). She only had oatmeal for breakfast.


She has never had anything similar to this in the past.  She generally feels 

unwell.  She is not had any fever, diarrhea, chest pain, shortness of breath, 

dysuria, hematuria, blood in her stools.  She denies alcohol abuse.  Pain does 

not move anywhere.  Nothing seems to make it better or worse.  She has not tried

anything at home.  He does not drink alcohol, and is status post 

cholecystectomy.  No calcium disorders.


She takes 20mg methotrexate weekly on Fridays and is on 3 mg of chronic 

prednisone for her stills disease.  She was diagnosed with celiac disease 

January 2020 and has just recently started on a gluten-free diet.  No recent 

sick contacts that she can recall.


Labs significant for lipase 68635, glucose 477, , bilirubin 1.1, Calcium 

8.3, K 3.7, WBC 6.3, Hb 15, Platelets 247


CT abdomen pelvis shows surgically absent gallbladder and surgically absent 

uterus and describes duodenal and jejunal intraluminal lipomas. Findings of 

acute pancreatitis. No loculated collections. No biliary dilatation.


Admitted for further treatment.





5/27: Lipase down. A1c 13.6, Triglycerides 3252. Started on insulin gtt.


5/28: Abdominal pain and glucose improved.  Having ice chips per GI.  Afebrile. 

No CP or SOB. D/w [facility] rheumatology, to hold MTX therapy on 5/29 given her 

leukopenia and pancreatitis.


5/29: Overnight afebrile.  Labs improved. Still with left-sided abdominal pain. 

WBC 1.8.  She notes she had not previously had a formal diagnosis of diabetes.  

No CP or SOB


5/30: Pain improved, advancing diet per GI lipase 328.  WBC 2, K2.6, MG 1.4


5/31: Afebrile overnight.  Still with abdominal pain, no CP or SOB


6/1: Patient continues to have discomfort especially when she has a diet no 

fever or chills were reported nevertheless she says that she woke up drenched in

sweat in the middle of night, no other complaints were reported overnight, plan 

of care discussed in detail


6/2: Patient will continue to have bowel rest in light of her CT from yesterday.

No new complaints.





Vitals


Vitals





Vital Signs








  Date Time  Temp Pulse Resp B/P (MAP) Pulse Ox O2 Delivery O2 Flow Rate FiO2


 


6/4/20 08:55      Room Air  


 


6/4/20 07:00 98.7 80 18 129/76 (93) 95   





 98.7       


 


6/3/20 21:30       2.0 











Physical Exam


Physical Exam


GENERAL:  Alert, oriented female, not in distress.


VITAL SIGNS:  Stable, afebrile.


HEENT:  NAD.


NECK:  Supple.  No JVP.  No lymphadenopathy.


LUNGS:  Clear.


HEART:  S1, S2 regular.


ABDOMEN:  Soft, nontender.  No organomegaly.


EXTREMITIES:  No edema, cyanosis.


SKIN:  Unremarkable.


NEUROLOGIC:  The patient is alert, awake and appropriate.  No focal neurologic


deficit.


General:  Alert, Oriented X3, Cooperative, No acute distress


Lungs:  Clear


Abdomen:  Normal bowel sounds, Soft, No tenderness


Extremities:  No clubbing, No cyanosis


Skin:  No rashes, No breakdown





Labs


LABS





CT ABD PELV W/ IV CONTRST ONLY


 


Indication: Reason: pancreatitis, ongoing upper abd pain / Spl. 


Instructions: IV OMNI 300 75 MLS / History: 


 


Exposure: One or more of the following individualized dose reduction 


techniques were utilized for this examination:  1. Automated exposure 


control  2. Adjustment of the mA and/or kV according to patient size  3. 


Use of iterative reconstruction technique.


 


Technique: Intravenous contrast was given. No oral contrast per request.


 


Comparison: 5/26/2020


 


FINDINGS:


Small pleural effusions are noted in the lung bases. 


Atelectasis/infiltrate in both lung bases.


 


Tiny hypodense lesion at the inferior right lobe of the liver, stable 


since prior study but too small to characterize, may just represent a 


small cyst, about 5 mm diameter. Spleen is nonenlarged.


 


Pancreatic swelling with ill-defined margins is again seen, slightly 


greater than on the prior study. Pancreatic body measures about 3.5 cm 


with today, compared with 2.7 cm on prior exam. Slight heterogeneity of 


pancreatic enhancement pattern, particularly in the region of the 


pancreatic body. There is also increase in stranding of the peripancreatic


fat and of disorganized peripancreatic fluid. There has been substantial 


increase in fluid in the left paracolic gutter and lateral to the left 


pararenal fascia. This measures 15 Hounsfield units compatible with simple


nature. This extends distally into the upper left pelvis, lateral to the 


left psoas muscle and posterior to the descending colon. There is also 


increase in fatty stranding in this area since the prior exam. There is 


been an increase in swelling and fatty stranding within the subcutaneous 


tissues of the left flank.


 


No evidence of adrenal mass. Kidneys demonstrate symmetric enhancement 


without dominant mass or hydronephrosis. Mild stranding in the perinephric


fat bilaterally, slightly greater than on the prior exam. Tiny 


nonobstructive calculus in the lower pole the left kidney is again seen.


 


Gallbladder is surgically absent. Small hiatal hernia. Wall thickening of 


the gastric antrum through proximal duodenum, may just be reactive and due


to nondistention. No significant small bowel distention. Mild wall 


thickening of the posterior wall of the descending colon, where it is in 


contact with the fluid and edema in the region of the lateral conal 


fascia, probably reactive in nature. The appendix appears within normal 


limits.


 


Tiny fatty density within the distal duodenum is again seen, likely a 


lipoma. Small fatty lesion within the proximal jejunum is also again 


identified, also likely a lipoma. These were seen previously.


 


Mild free pelvic fluid. No evidence of pneumoperitoneum. Urinary bladder 


appears unremarkable.


 


Vertebral body height and alignment are intact. No evidence of aggressive 


bone destruction.


 


The aorta is nonaneurysmal. Renal arteries, superior mesenteric artery and


celiac axis appear patent. Note there is narrowing at the origin of the 


celiac axis but distal flow is identified. Portal vein and splenic vein 


appear grossly patent.


 


IMPRESSION:


1. Progression of findings of acute pancreatitis. Pancreatic enhancement 


pattern is slightly heterogeneous but no definable nonenhancing component 


to suggest liquefactive necrosis at this time. In addition, there is been 


substantial increase in left lateral peripancreatic fluid which extends 


laterally to the left in the region of the lateral conal and renal fascia,


and dissects distally into the left pelvis. This could be infected fluid, 


but does not appear to be an encapsulated collection.


2. Small pleural effusions at both lung bases with bibasilar atelectasis 


and/or infiltrate.


3. Mild wall thickening of the posterior descending colon, most likely 


secondary or reactive given its proximity to the fluid/edema


4. Mild wall thickening of the distal stomach and duodenum, also likely 


reactive and due to nondistention.


5. Narrowing or stenosis at the origin of the celiac axis, but note there 


is evidence of distal enhancement.


 


Electronically signed by: Pal Cheung MD (6/1/2020 4:08 PM) [id number]














DICTATED and SIGNED BY:     PAL CHEUNG MD


DATE:     06/01/20 1608





Laboratory Tests








Test


 6/3/20


11:25 6/3/20


16:43 6/3/20


20:21 6/4/20


04:05


 


Glucose (Fingerstick)


 145 mg/dL


(70-99) 132 mg/dL


(70-99) 148 mg/dL


(70-99) 





 


White Blood Count


 


 


 


 1.6 x10^3/uL


(4.0-11.0)


 


Red Blood Count


 


 


 


 4.22 x10^6/uL


(3.50-5.40)


 


Hemoglobin


 


 


 


 11.7 g/dL


(12.0-15.5)


 


Hematocrit


 


 


 


 35.6 %


(36.0-47.0)


 


Mean Corpuscular Volume    85 fL () 


 


Mean Corpuscular Hemoglobin    28 pg (25-35) 


 


Mean Corpuscular Hemoglobin


Concent 


 


 


 33 g/dL


(31-37)


 


Red Cell Distribution Width


 


 


 


 15.5 %


(11.5-14.5)


 


Platelet Count


 


 


 


 280 x10^3/uL


(140-400)


 


Neutrophils (%) (Auto)    32 % (31-73) 


 


Lymphocytes (%) (Auto)    37 % (24-48) 


 


Monocytes (%) (Auto)    28 % (0-9) 


 


Eosinophils (%) (Auto)    2 % (0-3) 


 


Basophils (%) (Auto)    1 % (0-3) 


 


Neutrophils # (Auto)


 


 


 


 0.5 x10^3/uL


(1.8-7.7)


 


Lymphocytes # (Auto)


 


 


 


 0.6 x10^3/uL


(1.0-4.8)


 


Monocytes # (Auto)


 


 


 


 0.4 x10^3/uL


(0.0-1.1)


 


Eosinophils # (Auto)


 


 


 


 0.0 x10^3/uL


(0.0-0.7)


 


Basophils # (Auto)


 


 


 


 0.0 x10^3/uL


(0.0-0.2)


 


Prothrombin Time


 


 


 


 13.6 SEC


(11.7-14.0)


 


Prothromb Time International


Ratio 


 


 


 1.1 (0.8-1.1) 





 


Sodium Level


 


 


 


 140 mmol/L


(136-145)


 


Potassium Level


 


 


 


 3.5 mmol/L


(3.5-5.1)


 


Chloride Level


 


 


 


 100 mmol/L


()


 


Carbon Dioxide Level


 


 


 


 22 mmol/L


(21-32)


 


Anion Gap    18 (6-14) 


 


Blood Urea Nitrogen    4 mg/dL (7-20) 


 


Creatinine


 


 


 


 0.6 mg/dL


(0.6-1.0)


 


Estimated GFR


(Cockcroft-Gault) 


 


 


 102.7 





 


Glucose Level


 


 


 


 166 mg/dL


(70-99)


 


Calcium Level


 


 


 


 8.6 mg/dL


(8.5-10.1)


 


Test


 6/4/20


07:31 


 


 





 


Glucose (Fingerstick)


 146 mg/dL


(70-99) 


 


 














Assessment and Plan


Assessmemt and Plan


Problems


Medical Problems:


(1) Dehydration


Status: Acute  





(2) Hyperglycemia


Status: Acute  





(3) Pancreatitis, acute


Status: Acute  











Comment


Review of Relevant


I have reviewed the following items elizabeth (where applicable) has been applied.


Labs





Laboratory Tests








Test


 6/2/20


11:15 6/2/20


16:28 6/2/20


20:38 6/3/20


04:10


 


Glucose (Fingerstick)


 192 mg/dL


(70-99) 145 mg/dL


(70-99) 137 mg/dL


(70-99) 





 


Sodium Level


 


 


 


 141 mmol/L


(136-145)


 


Potassium Level


 


 


 


 3.3 mmol/L


(3.5-5.1)


 


Chloride Level


 


 


 


 100 mmol/L


()


 


Carbon Dioxide Level


 


 


 


 28 mmol/L


(21-32)


 


Anion Gap    13 (6-14) 


 


Blood Urea Nitrogen    3 mg/dL (7-20) 


 


Creatinine


 


 


 


 0.6 mg/dL


(0.6-1.0)


 


Estimated GFR


(Cockcroft-Gault) 


 


 


 102.7 





 


Glucose Level


 


 


 


 171 mg/dL


(70-99)


 


Calcium Level


 


 


 


 8.9 mg/dL


(8.5-10.1)


 


Test


 6/3/20


06:57 6/3/20


11:25 6/3/20


16:43 6/3/20


20:21


 


Glucose (Fingerstick)


 153 mg/dL


(70-99) 145 mg/dL


(70-99) 132 mg/dL


(70-99) 148 mg/dL


(70-99)


 


Test


 6/4/20


04:05 6/4/20


07:31 


 





 


White Blood Count


 1.6 x10^3/uL


(4.0-11.0) 


 


 





 


Red Blood Count


 4.22 x10^6/uL


(3.50-5.40) 


 


 





 


Hemoglobin


 11.7 g/dL


(12.0-15.5) 


 


 





 


Hematocrit


 35.6 %


(36.0-47.0) 


 


 





 


Mean Corpuscular Volume 85 fL ()    


 


Mean Corpuscular Hemoglobin 28 pg (25-35)    


 


Mean Corpuscular Hemoglobin


Concent 33 g/dL


(31-37) 


 


 





 


Red Cell Distribution Width


 15.5 %


(11.5-14.5) 


 


 





 


Platelet Count


 280 x10^3/uL


(140-400) 


 


 





 


Neutrophils (%) (Auto) 32 % (31-73)    


 


Lymphocytes (%) (Auto) 37 % (24-48)    


 


Monocytes (%) (Auto) 28 % (0-9)    


 


Eosinophils (%) (Auto) 2 % (0-3)    


 


Basophils (%) (Auto) 1 % (0-3)    


 


Neutrophils # (Auto)


 0.5 x10^3/uL


(1.8-7.7) 


 


 





 


Lymphocytes # (Auto)


 0.6 x10^3/uL


(1.0-4.8) 


 


 





 


Monocytes # (Auto)


 0.4 x10^3/uL


(0.0-1.1) 


 


 





 


Eosinophils # (Auto)


 0.0 x10^3/uL


(0.0-0.7) 


 


 





 


Basophils # (Auto)


 0.0 x10^3/uL


(0.0-0.2) 


 


 





 


Prothrombin Time


 13.6 SEC


(11.7-14.0) 


 


 





 


Prothromb Time International


Ratio 1.1 (0.8-1.1) 


 


 


 





 


Sodium Level


 140 mmol/L


(136-145) 


 


 





 


Potassium Level


 3.5 mmol/L


(3.5-5.1) 


 


 





 


Chloride Level


 100 mmol/L


() 


 


 





 


Carbon Dioxide Level


 22 mmol/L


(21-32) 


 


 





 


Anion Gap 18 (6-14)    


 


Blood Urea Nitrogen 4 mg/dL (7-20)    


 


Creatinine


 0.6 mg/dL


(0.6-1.0) 


 


 





 


Estimated GFR


(Cockcroft-Gault) 102.7 


 


 


 





 


Glucose Level


 166 mg/dL


(70-99) 


 


 





 


Calcium Level


 8.6 mg/dL


(8.5-10.1) 


 


 





 


Glucose (Fingerstick)


 


 146 mg/dL


(70-99) 


 











Laboratory Tests








Test


 6/3/20


11:25 6/3/20


16:43 6/3/20


20:21 6/4/20


04:05


 


Glucose (Fingerstick)


 145 mg/dL


(70-99) 132 mg/dL


(70-99) 148 mg/dL


(70-99) 





 


White Blood Count


 


 


 


 1.6 x10^3/uL


(4.0-11.0)


 


Red Blood Count


 


 


 


 4.22 x10^6/uL


(3.50-5.40)


 


Hemoglobin


 


 


 


 11.7 g/dL


(12.0-15.5)


 


Hematocrit


 


 


 


 35.6 %


(36.0-47.0)


 


Mean Corpuscular Volume    85 fL () 


 


Mean Corpuscular Hemoglobin    28 pg (25-35) 


 


Mean Corpuscular Hemoglobin


Concent 


 


 


 33 g/dL


(31-37)


 


Red Cell Distribution Width


 


 


 


 15.5 %


(11.5-14.5)


 


Platelet Count


 


 


 


 280 x10^3/uL


(140-400)


 


Neutrophils (%) (Auto)    32 % (31-73) 


 


Lymphocytes (%) (Auto)    37 % (24-48) 


 


Monocytes (%) (Auto)    28 % (0-9) 


 


Eosinophils (%) (Auto)    2 % (0-3) 


 


Basophils (%) (Auto)    1 % (0-3) 


 


Neutrophils # (Auto)


 


 


 


 0.5 x10^3/uL


(1.8-7.7)


 


Lymphocytes # (Auto)


 


 


 


 0.6 x10^3/uL


(1.0-4.8)


 


Monocytes # (Auto)


 


 


 


 0.4 x10^3/uL


(0.0-1.1)


 


Eosinophils # (Auto)


 


 


 


 0.0 x10^3/uL


(0.0-0.7)


 


Basophils # (Auto)


 


 


 


 0.0 x10^3/uL


(0.0-0.2)


 


Prothrombin Time


 


 


 


 13.6 SEC


(11.7-14.0)


 


Prothromb Time International


Ratio 


 


 


 1.1 (0.8-1.1) 





 


Sodium Level


 


 


 


 140 mmol/L


(136-145)


 


Potassium Level


 


 


 


 3.5 mmol/L


(3.5-5.1)


 


Chloride Level


 


 


 


 100 mmol/L


()


 


Carbon Dioxide Level


 


 


 


 22 mmol/L


(21-32)


 


Anion Gap    18 (6-14) 


 


Blood Urea Nitrogen    4 mg/dL (7-20) 


 


Creatinine


 


 


 


 0.6 mg/dL


(0.6-1.0)


 


Estimated GFR


(Cockcroft-Gault) 


 


 


 102.7 





 


Glucose Level


 


 


 


 166 mg/dL


(70-99)


 


Calcium Level


 


 


 


 8.6 mg/dL


(8.5-10.1)


 


Test


 6/4/20


07:31 


 


 





 


Glucose (Fingerstick)


 146 mg/dL


(70-99) 


 


 











Medications





Current Medications


Iohexol (Omnipaque 300 Mg/ml) 75 ml 1X  ONCE IV  Last administered on 5/26/20at 

11:30;  Start 5/26/20 at 11:30;  Stop 5/26/20 at 11:31;  Status DC


Iohexol (Omnipaque 240 Mg/ml) 50 ml 1X  ONCE PO  Last administered on 5/26/20at 

11:30;  Start 5/26/20 at 11:30;  Stop 5/26/20 at 11:31;  Status DC


Info (CONTRAST GIVEN -- Rx MONITORING) 1 each PRN DAILY  PRN MC SEE COMMENTS;  

Start 5/26/20 at 11:30;  Stop 5/28/20 at 11:29;  Status DC


Sodium Chloride 1,000 ml @  0 mls/hr 1X  ONCE IV  Last administered on 5/26/20at

12:27;  Start 5/26/20 at 12:15;  Stop 5/26/20 at 12:16;  Status DC


Morphine Sulfate (Morphine Sulfate) 5 mg 1X  ONCE IV  Last administered on 

5/26/20at 12:45;  Start 5/26/20 at 12:30;  Stop 5/26/20 at 12:33;  Status DC


Morphine Sulfate (Morphine Sulfate) 5 mg 1X  ONCE IV ;  Start 5/26/20 at 13:45; 

Stop 5/26/20 at 13:46;  Status DC


Morphine Sulfate (Morphine Sulfate) 4 mg PRN Q2HR  PRN IV MODERATE TO SEVERE 

PAIN Last administered on 5/28/20at 16:18;  Start 5/26/20 at 14:45;  Stop 6/1/20

at 10:23;  Status DC


Sodium Chloride 1,000 ml @  100 mls/hr Q10H IV  Last administered on 6/1/20at 

08:27;  Start 5/26/20 at 14:44;  Stop 6/1/20 at 10:23;  Status DC


Ondansetron HCl (Zofran) 4 mg PRN Q4HRS  PRN IV NAUSEA/VOMITING Last 

administered on 6/2/20at 14:06;  Start 5/26/20 at 14:45


Acetaminophen (Tylenol Supp) 650 mg PRN Q4HRS  PRN SC TEMP OVER 100.4F OR MILD 

PAIN;  Start 5/26/20 at 14:45


Enoxaparin Sodium (Lovenox 40mg Syringe) 40 mg Q24H SQ  Last administered on 

6/3/20at 17:07;  Start 5/26/20 at 15:00


Insulin Human Lispro (HumaLOG) 0-9 UNITS Q4HRS SQ  Last administered on 

5/30/20at 18:15;  Start 5/26/20 at 16:00;  Stop 5/30/20 at 19:32;  Status DC


Dextrose (Dextrose 50%-Water Syringe) 12.5 gm PRN Q15MIN  PRN IV SEE COMMENTS;  

Start 5/26/20 at 14:45


Albuterol Sulfate (Ventolin Neb Soln) 3 mg PRN QID  PRN NEB SHORTNESS OF BREATH;

 Start 5/26/20 at 15:15


Sodium Chloride (Saline Mist Nasal) 1 néstor PRN Q1HR  PRN NS NASAL CONGESTION;  

Start 5/26/20 at 15:15


Methylprednisolone Sodium Succinate (SOLU-Medrol 40MG VIAL) 40 mg DAILY IV  Last

administered on 5/28/20at 08:21;  Start 5/26/20 at 15:15;  Stop 5/28/20 at 

13:44;  Status DC


Insulin Human Regular 100 unit/ Sodium Chloride 101 ml @ 0 mls/hr CONT  PRN IV 

SEE I/O RECORD Last administered on 5/27/20at 02:12;  Start 5/26/20 at 18:00


Fentanyl Citrate (Fentanyl 2ml Vial) 50 mcg PRN Q2HR  PRN IVP MOD TO SEVERE 

PAIN, 2ND CHOICE Last administered on 6/4/20at 08:21;  Start 5/27/20 at 11:45


Famotidine (Pepcid Vial) 20 mg QHS IVP  Last administered on 5/31/20at 22:14;  

Start 5/28/20 at 21:00;  Stop 6/1/20 at 10:21;  Status DC


Prednisone (Prednisone) 3 mg DAILY PO  Last administered on 6/1/20at 08:27;  

Start 5/29/20 at 09:00


Potassium Chloride/Water 100 ml @  100 mls/hr Q1H IV  Last administered on 

5/30/20at 15:22;  Start 5/30/20 at 11:00;  Stop 5/30/20 at 14:59;  Status DC


Potassium Chloride (Klor-Con) 40 meq 1X  ONCE PO  Last administered on 5/30/20at

11:01;  Start 5/30/20 at 10:15;  Stop 5/30/20 at 10:22;  Status DC


Tramadol HCl (Ultram) 50 mg PRN Q6HRS  PRN PO MODERATE PAIN 4-6 Last 

administered on 6/2/20at 06:08;  Start 5/30/20 at 10:15


Oxycodone HCl (Roxicodone) 5 mg PRN Q6HRS  PRN PO SEVERE PAIN 7-10 Last 

administered on 5/30/20at 11:08;  Start 5/30/20 at 10:15;  Stop 5/30/20 at 

15:30;  Status DC


Magnesium Sulfate 100 ml @  25 mls/hr 1X  ONCE IV  Last administered on 

5/30/20at 11:06;  Start 5/30/20 at 11:00;  Stop 5/30/20 at 14:59;  Status DC


Magnesium Hydroxide (Milk Of Magnesia) 2,400 mg PRN DAILY  PRN PO CONSTIPATION 

2ND CHOICE;  Start 5/30/20 at 15:30


Psyllium Hydrophilic Mucilloid (Metamucil Fiber Packet) 1 pkt DAILY PO  Last 

administered on 6/1/20at 08:28;  Start 5/30/20 at 15:30


Polyethylene Glycol (miraLAX PACKET) 17 gm DAILY PO  Last administered on 

6/1/20at 08:28;  Start 5/30/20 at 15:30


Oxycodone HCl (Roxicodone) 5 mg PRN Q4HRS  PRN PO SEVERE PAIN 7-10 Last 

administered on 6/2/20at 03:41;  Start 5/30/20 at 15:30


Insulin Human Lispro (HumaLOG) 0-9 UNITS [id number] SQ  Last administered on 

6/1/20at 17:14;  Start 5/30/20 at 19:30


Famotidine (Pepcid) 20 mg QHS PO  Last administered on 6/1/20at 20:57;  Start 

6/1/20 at 21:00;  Stop 6/2/20 at 10:56;  Status DC


Iohexol (Omnipaque 300 Mg/ml) 75 ml 1X  ONCE IV  Last administered on 6/1/20at 

11:45;  Start 6/1/20 at 11:45;  Stop 6/1/20 at 11:46;  Status DC


Info (CONTRAST GIVEN -- Rx MONITORING) 1 each PRN DAILY  PRN MC SEE COMMENTS;  

Start 6/1/20 at 11:45;  Stop 6/3/20 at 11:44;  Status DC


Ringer's Solution 1,000 ml @  75 mls/hr [id number] IV  Last administered on 

6/4/20at 01:53;  Start 6/2/20 at 09:30


Potassium Chloride/Water 100 ml @  100 mls/hr Q1H IV  Last administered on 

6/2/20at 21:27;  Start 6/2/20 at 10:00;  Stop 6/2/20 at 21:59;  Status DC


Famotidine (Pepcid Vial) 20 mg QHS IVP  Last administered on 6/3/20at 21:00;  

Start 6/2/20 at 21:00


Bisacodyl (Dulcolax Supp) 10 mg PRN DAILY  PRN SC CONSTIPATION;  Start 6/2/20 at

11:00


Leucovorin Calcium (Wellcovorin) 5 mg DAILY PO  Last administered on 6/3/20at 

09:53;  Start 6/3/20 at 10:00;  Stop 6/4/20 at 09:24;  Status DC


Info (Tpn Per Pharmacy) 1 each PRN DAILY  PRN MC SEE COMMENTS;  Start 6/3/20 at 

12:00


Potassium Chloride/Water 100 ml @  100 mls/hr Q1H IV  Last administered on 

6/4/20at 01:53;  Start 6/3/20 at 20:00;  Stop 6/3/20 at 23:59;  Status DC


Leucovorin Calcium (Wellcovorin) 15 mg Q8HRS PO ;  Start 6/4/20 at 10:00





Active Scripts


Active


Reported


Zyrtec (Cetirizine Hcl) 10 Mg Capsule 10 Mg PO DAILY


Methotrexate (Methotrexate Sodium) 2.5 Mg Tablet 8 Tab PO WEEKLY


Protonix  ** (Pantoprazole Sodium) 40 Mg Tablet.dr 40 Mg PO DAILYAC


Folic Acid 0.8 Mg Capsule 1 Cap PO DAILY 30 Days


Ferrous Sulfate 325 Mg Tablet 1 Tab PO DAILY


Vitals/I & O





Vital Sign - Last 24 Hours








 6/3/20 6/3/20 6/3/20 6/3/20





 10:30 10:51 14:18 14:48


 


Temp  97.9  





  97.9  


 


Pulse  80  


 


Resp  18  


 


B/P (MAP)  138/62 (87)  


 


Pulse Ox  94  


 


O2 Delivery Room Air Room Air Nasal Cannula Nasal Cannula


 


O2 Flow Rate   2.0 


 


    





    





 6/3/20 6/3/20 6/3/20 6/3/20





 15:00 17:09 17:39 19:00


 


Temp 97.9   98.1





 97.9   98.1


 


Pulse 80   88


 


Resp 18   18


 


B/P (MAP) 132/58 (82)   143/77 (99)


 


Pulse Ox 95   98


 


O2 Delivery Room Air Room Air Room Air Room Air


 


    





    





 6/3/20 6/3/20 6/3/20 6/3/20





 20:00 21:00 21:30 23:00


 


Temp    98.3





    98.3


 


Pulse    83


 


Resp    18


 


B/P (MAP)    154/73 (100)


 


Pulse Ox    96


 


O2 Delivery Room Air Room Air Nasal Cannula 


 


O2 Flow Rate   2.0 


 


    





    





 6/4/20 6/4/20 6/4/20 6/4/20





 00:34 01:04 03:00 03:50


 


Temp   98.4 





   98.4 


 


Pulse   84 


 


Resp   18 


 


B/P (MAP)   147/94 (111) 


 


Pulse Ox   92 


 


O2 Delivery Room Air Room Air Room Air Room Air


 


    





    





 6/4/20 6/4/20 6/4/20 6/4/20





 04:20 07:00 08:00 08:21


 


Temp  98.7  





  98.7  


 


Pulse  80  


 


Resp  18  


 


B/P (MAP)  129/76 (93)  


 


Pulse Ox  95  


 


O2 Delivery Room Air Room Air Room Air Room Air


 


    





    





 6/4/20   





 08:55   


 


O2 Delivery Room Air   











Hemodynamically unstable?:  No


Is patient in severe pain?:  No


Is NPO status required?:  No











PRITI HANSON MD           Jun 4, 2020 10:30

## 2020-06-05 VITALS — SYSTOLIC BLOOD PRESSURE: 140 MMHG | DIASTOLIC BLOOD PRESSURE: 71 MMHG

## 2020-06-05 VITALS — SYSTOLIC BLOOD PRESSURE: 131 MMHG | DIASTOLIC BLOOD PRESSURE: 79 MMHG

## 2020-06-05 VITALS — DIASTOLIC BLOOD PRESSURE: 72 MMHG | SYSTOLIC BLOOD PRESSURE: 120 MMHG

## 2020-06-05 VITALS — DIASTOLIC BLOOD PRESSURE: 80 MMHG | SYSTOLIC BLOOD PRESSURE: 146 MMHG

## 2020-06-05 VITALS — DIASTOLIC BLOOD PRESSURE: 68 MMHG | SYSTOLIC BLOOD PRESSURE: 120 MMHG

## 2020-06-05 VITALS — SYSTOLIC BLOOD PRESSURE: 120 MMHG | DIASTOLIC BLOOD PRESSURE: 76 MMHG

## 2020-06-05 LAB
ALBUMIN SERPL-MCNC: 3 G/DL (ref 3.4–5)
ALBUMIN/GLOB SERPL: 0.6 {RATIO} (ref 1–1.7)
ALP SERPL-CCNC: 104 U/L (ref 46–116)
ALT SERPL-CCNC: 23 U/L (ref 14–59)
ANION GAP SERPL CALC-SCNC: 11 MMOL/L (ref 6–14)
ANION GAP SERPL CALC-SCNC: 7 MMOL/L (ref 6–14)
AST SERPL-CCNC: 17 U/L (ref 15–37)
BASOPHILS # BLD AUTO: 0 X10^3/UL (ref 0–0.2)
BASOPHILS NFR BLD: 1 % (ref 0–3)
BILIRUB SERPL-MCNC: 0.4 MG/DL (ref 0.2–1)
BUN SERPL-MCNC: 7 MG/DL (ref 7–20)
BUN SERPL-MCNC: 8 MG/DL (ref 7–20)
BUN/CREAT SERPL: 10 (ref 6–20)
CALCIUM SERPL-MCNC: 8.1 MG/DL (ref 8.5–10.1)
CALCIUM SERPL-MCNC: 8.5 MG/DL (ref 8.5–10.1)
CHLORIDE SERPL-SCNC: 100 MMOL/L (ref 98–107)
CHLORIDE SERPL-SCNC: 101 MMOL/L (ref 98–107)
CO2 SERPL-SCNC: 27 MMOL/L (ref 21–32)
CO2 SERPL-SCNC: 27 MMOL/L (ref 21–32)
CREAT SERPL-MCNC: 0.6 MG/DL (ref 0.6–1)
CREAT SERPL-MCNC: 0.7 MG/DL (ref 0.6–1)
CRP SERPL-MCNC: 44.5 MG/L (ref 0–3.3)
EOSINOPHIL NFR BLD: 0 X10^3/UL (ref 0–0.7)
EOSINOPHIL NFR BLD: 1 % (ref 0–3)
ERYTHROCYTE [DISTWIDTH] IN BLOOD BY AUTOMATED COUNT: 15.4 % (ref 11.5–14.5)
ERYTHROCYTE [DISTWIDTH] IN BLOOD BY AUTOMATED COUNT: 16.3 % (ref 11.5–14.5)
GFR SERPLBLD BASED ON 1.73 SQ M-ARVRAT: 102.7 ML/MIN
GFR SERPLBLD BASED ON 1.73 SQ M-ARVRAT: 85.9 ML/MIN
GLOBULIN SER-MCNC: 4.7 G/DL (ref 2.2–3.8)
GLUCOSE SERPL-MCNC: 266 MG/DL (ref 70–99)
GLUCOSE SERPL-MCNC: 268 MG/DL (ref 70–99)
HCT VFR BLD CALC: 33.9 % (ref 36–47)
HCT VFR BLD CALC: 38 % (ref 36–47)
HGB BLD-MCNC: 11.2 G/DL (ref 12–15.5)
HGB BLD-MCNC: 12.3 G/DL (ref 12–15.5)
LYMPHOCYTES # BLD: 0.4 X10^3/UL (ref 1–4.8)
LYMPHOCYTES NFR BLD AUTO: 29 % (ref 24–48)
MAGNESIUM SERPL-MCNC: 1.7 MG/DL (ref 1.8–2.4)
MCH RBC QN AUTO: 28 PG (ref 25–35)
MCH RBC QN AUTO: 29 PG (ref 25–35)
MCHC RBC AUTO-ENTMCNC: 32 G/DL (ref 31–37)
MCHC RBC AUTO-ENTMCNC: 33 G/DL (ref 31–37)
MCV RBC AUTO: 84 FL (ref 79–100)
MCV RBC AUTO: 88 FL (ref 79–100)
MONO #: 0.4 X10^3/UL (ref 0–1.1)
MONOCYTES NFR BLD: 24 % (ref 0–9)
NEUT #: 0.7 X10^3/UL (ref 1.8–7.7)
NEUTROPHILS NFR BLD AUTO: 45 % (ref 31–73)
PHOSPHATE SERPL-MCNC: 3.7 MG/DL (ref 2.6–4.7)
PLATELET # BLD AUTO: 265 X10^3/UL (ref 140–400)
PLATELET # BLD AUTO: 287 X10^3/UL (ref 140–400)
POTASSIUM SERPL-SCNC: 3.1 MMOL/L (ref 3.5–5.1)
POTASSIUM SERPL-SCNC: 3.7 MMOL/L (ref 3.5–5.1)
PROT SERPL-MCNC: 7.7 G/DL (ref 6.4–8.2)
RBC # BLD AUTO: 4.06 X10^6/UL (ref 3.5–5.4)
RBC # BLD AUTO: 4.31 X10^6/UL (ref 3.5–5.4)
SODIUM SERPL-SCNC: 135 MMOL/L (ref 136–145)
SODIUM SERPL-SCNC: 138 MMOL/L (ref 136–145)
WBC # BLD AUTO: 1.5 X10^3/UL (ref 4–11)
WBC # BLD AUTO: 1.8 X10^3/UL (ref 4–11)

## 2020-06-05 RX ADMIN — LEUCOVORIN CALCIUM SCH MG: 5 TABLET ORAL at 22:35

## 2020-06-05 RX ADMIN — INSULIN LISPRO SCH UNITS: 100 INJECTION, SOLUTION INTRAVENOUS; SUBCUTANEOUS at 08:07

## 2020-06-05 RX ADMIN — INSULIN LISPRO SCH UNITS: 100 INJECTION, SOLUTION INTRAVENOUS; SUBCUTANEOUS at 12:00

## 2020-06-05 RX ADMIN — INSULIN LISPRO SCH UNITS: 100 INJECTION, SOLUTION INTRAVENOUS; SUBCUTANEOUS at 17:34

## 2020-06-05 RX ADMIN — POLYETHYLENE GLYCOL 3350 SCH GM: 17 POWDER, FOR SOLUTION ORAL at 08:57

## 2020-06-05 RX ADMIN — PSYLLIUM HUSK SCH PKT: 3.4 POWDER ORAL at 08:56

## 2020-06-05 RX ADMIN — LEUCOVORIN CALCIUM SCH MG: 5 TABLET ORAL at 05:55

## 2020-06-05 RX ADMIN — Medication PRN EACH: at 13:31

## 2020-06-05 RX ADMIN — ENOXAPARIN SODIUM SCH MG: 40 INJECTION SUBCUTANEOUS at 14:13

## 2020-06-05 RX ADMIN — SODIUM CHLORIDE, SODIUM LACTATE, POTASSIUM CHLORIDE, AND CALCIUM CHLORIDE SCH MLS/HR: .6; .31; .03; .02 INJECTION, SOLUTION INTRAVENOUS at 17:30

## 2020-06-05 RX ADMIN — FAMOTIDINE SCH MG: 10 INJECTION, SOLUTION INTRAVENOUS at 22:03

## 2020-06-05 RX ADMIN — SODIUM CHLORIDE, SODIUM LACTATE, POTASSIUM CHLORIDE, AND CALCIUM CHLORIDE SCH MLS/HR: .6; .31; .03; .02 INJECTION, SOLUTION INTRAVENOUS at 04:10

## 2020-06-05 RX ADMIN — LEUCOVORIN CALCIUM SCH MG: 5 TABLET ORAL at 14:06

## 2020-06-05 NOTE — PDOC
Infectious Disease Note


Subjective


Subjective


Patient is feeling better.  Walking around.





ROS


ROS


Abdominal pain is better no nausea vomiting diarrhea





Vital Sign


Vital Signs





Vital Signs








  Date Time  Temp Pulse Resp B/P (MAP) Pulse Ox O2 Delivery O2 Flow Rate FiO2


 


6/5/20 07:00 98.2 96 18 120/76 (91) 96 Room Air  





 98.2       


 


6/4/20 20:00       2.0 











Physical Exam


PHYSICAL EXAM


GENERAL:  Alert, oriented female, not in distress.


VITAL SIGNS:  Stable, afebrile.


HEENT:  NAD.


NECK:  Supple.  No JVP.  No lymphadenopathy.


LUNGS:  Clear.


HEART:  S1, S2 regular.


ABDOMEN:  Soft, nontender.  No organomegaly.


EXTREMITIES:  No edema, cyanosis.


SKIN:  Unremarkable.


NEUROLOGIC:  The patient is alert, awake and appropriate.  No focal neurologic


deficit.





Labs


Lab





Laboratory Tests








Test


 6/4/20


11:59 6/4/20


16:51 6/4/20


20:26 6/4/20


23:54


 


Glucose (Fingerstick)


 127 mg/dL


(70-99) 140 mg/dL


(70-99) 131 mg/dL


(70-99) 188 mg/dL


(70-99)


 


Test


 6/5/20


05:59 6/5/20


06:45 


 





 


Glucose (Fingerstick)


 251 mg/dL


(70-99) 


 


 





 


Sodium Level


 


 135 mmol/L


(136-145) 


 





 


Potassium Level


 


 3.1 mmol/L


(3.5-5.1) 


 





 


Chloride Level


 


 101 mmol/L


() 


 





 


Carbon Dioxide Level


 


 27 mmol/L


(21-32) 


 





 


Anion Gap  7 (6-14)   


 


Blood Urea Nitrogen  8 mg/dL (7-20)   


 


Creatinine


 


 0.6 mg/dL


(0.6-1.0) 


 





 


Estimated GFR


(Cockcroft-Gault) 


 102.7 


 


 





 


Glucose Level


 


 268 mg/dL


(70-99) 


 





 


Calcium Level


 


 8.1 mg/dL


(8.5-10.1) 


 





 


Phosphorus Level


 


 3.7 mg/dL


(2.6-4.7) 


 





 


Magnesium Level


 


 1.7 mg/dL


(1.8-2.4) 


 














Objective


Assessment


1.  Acute pancreatitis, possibly from methotrexate or very high triglycerides, 

Methotrexate has been stopped. 


Acute pancreatitis appears to be stabilizing.


2.  Neutropenia secondary to methotrexate toxicity.  Needs to replace the


folinic acid.


4.  Adult-onset Still's disease.





Plan


Plan of Care


Continue folinic acid dose increase after discussing with pharmacy


Continue supportive care


N.p.o.


We will monitor WBC,, 1.8 today











BUCKY BALLARD MD                Jun 5, 2020 08:30

## 2020-06-05 NOTE — NUR
Spoke with SUZETTE Mckinney nursing supervisor. Faye notified of poss transfer to [facility] and pt transfer 
to [facility]. She stated when [facility] responds pt will be transferred to tele if pt does not transfer 
to [facility] today.

## 2020-06-05 NOTE — NUR
YEIMI Ceron notified of CLD per Dr. Loving. She stated she would let me know if diet needed to be 
adjusted.

## 2020-06-05 NOTE — NUR
Dr. hanson notified of [facility] transfer pending insurance and inabliity to contact someone for 
more information. No new orders received.

## 2020-06-05 NOTE — PDOC
PROGRESS NOTES


Chief Complaint


Chief Complaint


impression 








Acute pancreatitis - likely 2/2 hyper-triglyceridemia.  IgG4 RD less likely 

given level of 72 mg/DL and chronic immunosuppression on methotrexate and p

rednisone.  I discussed with [facility] rheumatology concern that MTX could play a 

role as well.  Hold dosing for leukopenia


 6/2 ct Progression of findings of acute pancreatitis. Pancreatic enhancement  

pattern is slightly heterogeneous but no definable nonenhancing component to 

suggest liquefactive necrosis at this time. In addition, there is been 

substantial increase in left lateral peripancreatic fluid which extends 


laterally to the left in the region of the lateral conal and renal fascia,and 

dissects distally into the left pelvis. This could be infected fluid, but does 

not appear to be an encapsulated collection.


Duodenal and jejunal intraluminal lipomas


Hypertriglyceridemia - will need outpatient Endocrinology referral for 

cholesterol metabolism treatment, I have suggested prescription 2g TID fish oil 

(Vascepa or Lovaza) in the meantime once pancreatitis resolves


Hyponatremia


DM2 with Hyperglycemia - A1c 13.6. Should continue on insulin therapy until A1c 

< 8, then consider PO options. Would probably have to avoid GLP1 therapy given 

her pancreatitis.  Would defer to an endocrinologist for this.


Celiac disease 


Adult onset stills disease


Still's disease w/ h/o methotrexate and prednisone use, leukopenia


Anemia


Asthma


NATO on CPAP


GERD 


Leukopenia -likely related to chronic immunosuppression will hold MTX therapy 

for now and change to a prednisone taper on discharge.











FEN - LR for maintenance, NPO, will wait for recommendations from our consultant

regarding diet


PPX - lovenox


FULL CODE


DIspo - inpatient for pancreatitis recovery


TPN until pain/nausea resolve with worsening pancreatitis. 


heme consult





D/W DR DIAMOND , planning iv solumedrol 1000 mg iv x daily x 2 days, will need 

transfer to [facility] FOR RHEUMATOLOGY CONSULT SOON WILL CALL TRANSFER TEAM











39 min pt exam, chart review, > 50% of time spent with exam, chart review, pt 

care coordination





History of Present Illness


History of Present Illness


Ms Almanza is a 58yo F [employer]  w/ PMHx celiac disease, adult onset stills 

disease, Anemia, Asthma, NATO on CPAP, GERD who presents with severe upper 

abdominal pain, nausea, vomiting, fatigue. She states this started suddenly this

morning while she was working from home on [employer] (works as a 

on the  base). She only had oatmeal for breakfast.


She has never had anything similar to this in the past.  She generally feels 

unwell.  She is not had any fever, diarrhea, chest pain, shortness of breath, 

dysuria, hematuria, blood in her stools.  She denies alcohol abuse.  Pain does 

not move anywhere.  Nothing seems to make it better or worse.  She has not tried

anything at home.  He does not drink alcohol, and is status post 

cholecystectomy.  No calcium disorders.


She takes 20mg methotrexate weekly on  and is on 3 mg of chronic 

prednisone for her stills disease.  She was diagnosed with celiac disease 

2020 and has just recently started on a gluten-free diet.  No recent 

sick contacts that she can recall.


Labs significant for lipase 57540, glucose 477, , bilirubin 1.1, Calcium 

8.3, K 3.7, WBC 6.3, Hb 15, Platelets 247


CT abdomen pelvis shows surgically absent gallbladder and surgically absent 

uterus and describes duodenal and jejunal intraluminal lipomas. Findings of 

acute pancreatitis. No loculated collections. No biliary dilatation.


Admitted for further treatment.





: Lipase down. A1c 13.6, Triglycerides 3252. Started on insulin gtt.


: Abdominal pain and glucose improved.  Having ice chips per GI.  Afebrile. 

No CP or SOB. D/w [facility] rheumatology, to hold MTX therapy on  given her 

leukopenia and pancreatitis.


: Overnight afebrile.  Labs improved. Still with left-sided abdominal pain. 

WBC 1.8.  She notes she had not previously had a formal diagnosis of diabetes.  

No CP or SOB


: Pain improved, advancing diet per GI lipase 328.  WBC 2, K2.6, MG 1.4


: Afebrile overnight.  Still with abdominal pain, no CP or SOB


: Patient continues to have discomfort especially when she has a diet no 

fever or chills were reported nevertheless she says that she woke up drenched in

sweat in the middle of night, no other complaints were reported overnight, plan 

of care discussed in detail


: Patient will continue to have bowel rest in light of her CT from yesterday.

No new complaints.





Vitals


Vitals





Vital Signs








  Date Time  Temp Pulse Resp B/P (MAP) Pulse Ox O2 Delivery O2 Flow Rate FiO2


 


20 09:05      Room Air  


 


20 07:00 98.2 96 18 120/76 (91) 96   





 98.2       


 


20 20:00       2.0 











Physical Exam


Physical Exam


GENERAL:  Alert, oriented female, not in distress.


VITAL SIGNS:  Stable, afebrile.


HEENT:  NAD.


NECK:  Supple.  No JVP.  No lymphadenopathy.


LUNGS:  Clear.


HEART:  S1, S2 regular.


ABDOMEN:  Soft, nontender.  No organomegaly.


EXTREMITIES:  No edema, cyanosis.


SKIN:  Unremarkable.


NEUROLOGIC:  The patient is alert, awake and appropriate.  No focal neurologic


deficit.


General:  Alert, Oriented X3, Cooperative, No acute distress


Lungs:  Clear


Abdomen:  Soft, No tenderness


Extremities:  No clubbing, No cyanosis


Skin:  No rashes, No breakdown





Labs


LABS





Laboratory Tests








Test


 20


11:59 20


16:51 20


20:26 20


23:54


 


Glucose (Fingerstick)


 127 mg/dL


(70-99) 140 mg/dL


(70-99) 131 mg/dL


(70-99) 188 mg/dL


(70-99)


 


Test


 20


05:59 20


06:45 


 





 


Glucose (Fingerstick)


 251 mg/dL


(70-99) 


 


 





 


Sodium Level


 


 135 mmol/L


(136-145) 


 





 


Potassium Level


 


 3.1 mmol/L


(3.5-5.1) 


 





 


Chloride Level


 


 101 mmol/L


() 


 





 


Carbon Dioxide Level


 


 27 mmol/L


(21-32) 


 





 


Anion Gap  7 (6-14)   


 


Blood Urea Nitrogen  8 mg/dL (7-20)   


 


Creatinine


 


 0.6 mg/dL


(0.6-1.0) 


 





 


Estimated GFR


(Cockcroft-Gault) 


 102.7 


 


 





 


Glucose Level


 


 268 mg/dL


(70-99) 


 





 


Calcium Level


 


 8.1 mg/dL


(8.5-10.1) 


 





 


Phosphorus Level


 


 3.7 mg/dL


(2.6-4.7) 


 





 


Magnesium Level


 


 1.7 mg/dL


(1.8-2.4) 


 














Assessment and Plan


Assessmemt and Plan


Problems


Medical Problems:


(1) Dehydration


Status: Acute  





(2) Hyperglycemia


Status: Acute  





(3) Pancreatitis, acute


Status: Acute  











Comment


Review of Relevant


I have reviewed the following items elizabeth (where applicable) has been applied.


Labs





Laboratory Tests








Test


 6/3/20


11:25 6/3/20


16:43 6/3/20


20:21 20


04:05


 


Glucose (Fingerstick)


 145 mg/dL


(70-99) 132 mg/dL


(70-99) 148 mg/dL


(70-99) 





 


White Blood Count


 


 


 


 1.6 x10^3/uL


(4.0-11.0)


 


Red Blood Count


 


 


 


 4.22 x10^6/uL


(3.50-5.40)


 


Hemoglobin


 


 


 


 11.7 g/dL


(12.0-15.5)


 


Hematocrit


 


 


 


 35.6 %


(36.0-47.0)


 


Mean Corpuscular Volume    85 fL () 


 


Mean Corpuscular Hemoglobin    28 pg (25-35) 


 


Mean Corpuscular Hemoglobin


Concent 


 


 


 33 g/dL


(31-37)


 


Red Cell Distribution Width


 


 


 


 15.5 %


(11.5-14.5)


 


Platelet Count


 


 


 


 280 x10^3/uL


(140-400)


 


Neutrophils (%) (Auto)    32 % (31-73) 


 


Lymphocytes (%) (Auto)    37 % (24-48) 


 


Monocytes (%) (Auto)    28 % (0-9) 


 


Eosinophils (%) (Auto)    2 % (0-3) 


 


Basophils (%) (Auto)    1 % (0-3) 


 


Neutrophils # (Auto)


 


 


 


 0.5 x10^3/uL


(1.8-7.7)


 


Lymphocytes # (Auto)


 


 


 


 0.6 x10^3/uL


(1.0-4.8)


 


Monocytes # (Auto)


 


 


 


 0.4 x10^3/uL


(0.0-1.1)


 


Eosinophils # (Auto)


 


 


 


 0.0 x10^3/uL


(0.0-0.7)


 


Basophils # (Auto)


 


 


 


 0.0 x10^3/uL


(0.0-0.2)


 


Prothrombin Time


 


 


 


 13.6 SEC


(11.7-14.0)


 


Prothromb Time International


Ratio 


 


 


 1.1 (0.8-1.1) 





 


Sodium Level


 


 


 


 140 mmol/L


(136-145)


 


Potassium Level


 


 


 


 3.5 mmol/L


(3.5-5.1)


 


Chloride Level


 


 


 


 100 mmol/L


()


 


Carbon Dioxide Level


 


 


 


 22 mmol/L


(21-32)


 


Anion Gap    18 (6-14) 


 


Blood Urea Nitrogen    4 mg/dL (7-20) 


 


Creatinine


 


 


 


 0.6 mg/dL


(0.6-1.0)


 


Estimated GFR


(Cockcroft-Gault) 


 


 


 102.7 





 


Glucose Level


 


 


 


 166 mg/dL


(70-99)


 


Calcium Level


 


 


 


 8.6 mg/dL


(8.5-10.1)


 


Phosphorus Level


 


 


 


 4.1 mg/dL


(2.6-4.7)


 


Magnesium Level


 


 


 


 1.6 mg/dL


(1.8-2.4)


 


Triglycerides Level


 


 


 


 262 mg/dL


(0-150)


 


Test


 20


07:31 20


11:59 20


16:51 20


20:26


 


Glucose (Fingerstick)


 146 mg/dL


(70-99) 127 mg/dL


(70-99) 140 mg/dL


(70-99) 131 mg/dL


(70-99)


 


Test


 20


23:54 20


05:59 20


06:45 





 


Glucose (Fingerstick)


 188 mg/dL


(70-99) 251 mg/dL


(70-99) 


 





 


Sodium Level


 


 


 135 mmol/L


(136-145) 





 


Potassium Level


 


 


 3.1 mmol/L


(3.5-5.1) 





 


Chloride Level


 


 


 101 mmol/L


() 





 


Carbon Dioxide Level


 


 


 27 mmol/L


(21-32) 





 


Anion Gap   7 (6-14)  


 


Blood Urea Nitrogen   8 mg/dL (7-20)  


 


Creatinine


 


 


 0.6 mg/dL


(0.6-1.0) 





 


Estimated GFR


(Cockcroft-Gault) 


 


 102.7 


 





 


Glucose Level


 


 


 268 mg/dL


(70-99) 





 


Calcium Level


 


 


 8.1 mg/dL


(8.5-10.1) 





 


Phosphorus Level


 


 


 3.7 mg/dL


(2.6-4.7) 





 


Magnesium Level


 


 


 1.7 mg/dL


(1.8-2.4) 











Laboratory Tests








Test


 20


11:59 20


16:51 20


20:26 20


23:54


 


Glucose (Fingerstick)


 127 mg/dL


(70-99) 140 mg/dL


(70-99) 131 mg/dL


(70-99) 188 mg/dL


(70-99)


 


Test


 20


05:59 20


06:45 


 





 


Glucose (Fingerstick)


 251 mg/dL


(70-99) 


 


 





 


Sodium Level


 


 135 mmol/L


(136-145) 


 





 


Potassium Level


 


 3.1 mmol/L


(3.5-5.1) 


 





 


Chloride Level


 


 101 mmol/L


() 


 





 


Carbon Dioxide Level


 


 27 mmol/L


(21-32) 


 





 


Anion Gap  7 (6-14)   


 


Blood Urea Nitrogen  8 mg/dL (7-20)   


 


Creatinine


 


 0.6 mg/dL


(0.6-1.0) 


 





 


Estimated GFR


(Cockcroft-Gault) 


 102.7 


 


 





 


Glucose Level


 


 268 mg/dL


(70-99) 


 





 


Calcium Level


 


 8.1 mg/dL


(8.5-10.1) 


 





 


Phosphorus Level


 


 3.7 mg/dL


(2.6-4.7) 


 





 


Magnesium Level


 


 1.7 mg/dL


(1.8-2.4) 


 











Medications





Current Medications


Iohexol (Omnipaque 300 Mg/ml) 75 ml 1X  ONCE IV  Last administered on 20at 

11:30;  Start 20 at 11:30;  Stop 20 at 11:31;  Status DC


Iohexol (Omnipaque 240 Mg/ml) 50 ml 1X  ONCE PO  Last administered on 20at 

11:30;  Start 20 at 11:30;  Stop 20 at 11:31;  Status DC


Info (CONTRAST GIVEN -- Rx MONITORING) 1 each PRN DAILY  PRN MC SEE COMMENTS;  

Start 20 at 11:30;  Stop 20 at 11:29;  Status DC


Sodium Chloride 1,000 ml @  0 mls/hr 1X  ONCE IV  Last administered on 20at

12:27;  Start 20 at 12:15;  Stop 20 at 12:16;  Status DC


Morphine Sulfate (Morphine Sulfate) 5 mg 1X  ONCE IV  Last administered on 

20at 12:45;  Start 20 at 12:30;  Stop 20 at 12:33;  Status DC


Morphine Sulfate (Morphine Sulfate) 5 mg 1X  ONCE IV ;  Start 20 at 13:45; 

Stop 20 at 13:46;  Status DC


Morphine Sulfate (Morphine Sulfate) 4 mg PRN Q2HR  PRN IV MODERATE TO SEVERE 

PAIN Last administered on 20at 16:18;  Start 20 at 14:45;  Stop 20

at 10:23;  Status DC


Sodium Chloride 1,000 ml @  100 mls/hr Q10H IV  Last administered on 20at 

08:27;  Start 20 at 14:44;  Stop 20 at 10:23;  Status DC


Ondansetron HCl (Zofran) 4 mg PRN Q4HRS  PRN IV NAUSEA/VOMITING Last admi

nistered on 20at 14:06;  Start 20 at 14:45


Acetaminophen (Tylenol Supp) 650 mg PRN Q4HRS  PRN AK TEMP OVER 100.4F OR MILD 

PAIN;  Start 20 at 14:45


Enoxaparin Sodium (Lovenox 40mg Syringe) 40 mg Q24H SQ  Last administered on 

20at 16:19;  Start 20 at 15:00


Insulin Human Lispro (HumaLOG) 0-9 UNITS Q4HRS SQ  Last administered on 

20at 18:15;  Start 20 at 16:00;  Stop 20 at 19:32;  Status DC


Dextrose (Dextrose 50%-Water Syringe) 12.5 gm PRN Q15MIN  PRN IV SEE COMMENTS;  

Start 20 at 14:45


Albuterol Sulfate (Ventolin Neb Soln) 3 mg PRN QID  PRN NEB SHORTNESS OF BREATH;

 Start 20 at 15:15


Sodium Chloride (Saline Mist Nasal) 1 néstor PRN Q1HR  PRN NS NASAL CONGESTION;  

Start 20 at 15:15


Methylprednisolone Sodium Succinate (SOLU-Medrol 40MG VIAL) 40 mg DAILY IV  Last

administered on 20at 08:21;  Start 20 at 15:15;  Stop 20 at 13:4

4;  Status DC


Insulin Human Regular 100 unit/ Sodium Chloride 101 ml @ 0 mls/hr CONT  PRN IV 

SEE I/O RECORD Last administered on 20at 02:12;  Start 20 at 18:00


Fentanyl Citrate (Fentanyl 2ml Vial) 50 mcg PRN Q2HR  PRN IVP MOD TO SEVERE 

PAIN, 2ND CHOICE Last administered on 20at 16:21;  Start 20 at 11:45


Famotidine (Pepcid Vial) 20 mg QHS IVP  Last administered on 20at 22:14;  

Start 20 at 21:00;  Stop 20 at 10:21;  Status DC


Prednisone (Prednisone) 3 mg DAILY PO  Last administered on 20at 08:55;  

Start 20 at 09:00


Potassium Chloride/Water 100 ml @  100 mls/hr Q1H IV  Last administered on at 15:22;  Start 20 at 11:00;  Stop 20 at 14:59;  Status DC


Potassium Chloride (Klor-Con) 40 meq 1X  ONCE PO  Last administered on 20at

11:01;  Start 20 at 10:15;  Stop 20 at 10:22;  Status DC


Tramadol HCl (Ultram) 50 mg PRN Q6HRS  PRN PO MODERATE PAIN 4-6 Last 

administered on 20at 06:08;  Start 20 at 10:15


Oxycodone HCl (Roxicodone) 5 mg PRN Q6HRS  PRN PO SEVERE PAIN 7-10 Last 

administered on 20at 11:08;  Start 20 at 10:15;  Stop 20 at 

15:30;  Status DC


Magnesium Sulfate 100 ml @  25 mls/hr 1X  ONCE IV  Last administered on 

20at 11:06;  Start 20 at 11:00;  Stop 20 at 14:59;  Status DC


Magnesium Hydroxide (Milk Of Magnesia) 2,400 mg PRN DAILY  PRN PO CONSTIPATION 

2ND CHOICE;  Start 20 at 15:30


Psyllium Hydrophilic Mucilloid (Metamucil Fiber Packet) 1 pkt DAILY PO  Last 

administered on  08:28;  Start 20 at 15:30


Polyethylene Glycol (miraLAX PACKET) 17 gm DAILY PO  Last administered on 

 08:28;  Start 20 at 15:30


Oxycodone HCl (Roxicodone) 5 mg PRN Q4HRS  PRN PO SEVERE PAIN 7-10 Last 

administered on  09:05;  Start 20 at 15:30


Insulin Human Lispro (HumaLOG) 0-9 UNITS TIDWMEALHC SQ  Last administered on 

20at 17:14;  Start 20 at 19:30;  Stop 20 at 06:31;  Status DC


Famotidine (Pepcid) 20 mg QHS PO  Last administered on 20at 20:57;  Start 

20 at 21:00;  Stop 20 at 10:56;  Status DC


Iohexol (Omnipaque 300 Mg/ml) 75 ml 1X  ONCE IV  Last administered on 20at 

11:45;  Start 20 at 11:45;  Stop 20 at 11:46;  Status DC


Info (CONTRAST GIVEN -- Rx MONITORING) 1 each PRN DAILY  PRN MC SEE COMMENTS;  

Start 20 at 11:45;  Stop 6/3/20 at 11:44;  Status DC


Ringer's Solution 1,000 ml @  75 mls/hr [id number] IV  Last administered on 

20at 01:53;  Start 20 at 09:30


Potassium Chloride/Water 100 ml @  100 mls/hr Q1H IV  Last administered on 

20at 21:27;  Start 20 at 10:00;  Stop 20 at 21:59;  Status DC


Famotidine (Pepcid Vial) 20 mg QHS IVP  Last administered on 20at 22:10;  

Start 20 at 21:00


Bisacodyl (Dulcolax Supp) 10 mg PRN DAILY  PRN AK CONSTIPATION;  Start 20 at

11:00


Leucovorin Calcium (Wellcovorin) 5 mg DAILY PO  Last administered on 6/3/20at 

09:53;  Start 6/3/20 at 10:00;  Stop 20 at 09:24;  Status DC


Info (Tpn Per Pharmacy) 1 each PRN DAILY  PRN MC SEE COMMENTS Last administered 

on 20at 15:12;  Start 6/3/20 at 12:00


Potassium Chloride/Water 100 ml @  100 mls/hr Q1H IV  Last administered on 

20at 01:53;  Start 6/3/20 at 20:00;  Stop 6/3/20 at 23:59;  Status DC


Leucovorin Calcium (Wellcovorin) 15 mg Q8HRS PO  Last administered on 20at 

05:55;  Start 20 at 10:00


Lidocaine HCl (Buffered Lidocaine 1%) 3 ml 1X  ONCE INJ ;  Start 20 at 

12:00;  Stop 20 at 12:01;  Status DC


Lidocaine HCl (Buffered Lidocaine 1%) 3 ml STK-MED ONCE .ROUTE ;  Start 20 

at 11:58;  Stop 20 at 11:59;  Status DC


Sodium Chloride 90 meq/Potassium Chloride 50 meq/ Potassium Phosphate 13.6 

mmol/Magnesium Sulfate 10 meq/ Calcium Gluconate 10 meq/ Multivitamins 10 ml

/Chromium/ Copper/Manganese/ Seleni/Zn 1 ml/ Total Parenteral Nutrition/Amino 

Acids/Dextrose/ Fat Emulsion Intravenous 1,512 ml @  63 mls/hr TPN  CONT IV  

Last administered on 20at 22:12;  Start 20 at 22:00;  Stop 20 at 

21:59


Magnesium Sulfate/ Dextrose 100 ml @  100 mls/hr 1X  ONCE IV  Last administered 

on 20at 16:16;  Start 20 at 16:00;  Stop 20 at 16:59;  Status DC


Insulin Human Lispro (HumaLOG) 0-5 UNITS TIDWMEALS SQ ;  Start 20 at 08:00; 

Status Cancel


Dextrose (Dextrose 50%-Water Syringe) 12.5 gm PRN Q15MIN  PRN IV SEE COMMENTS;  

Start 20 at 07:45;  Status Cancel


Insulin Human Lispro (HumaLOG) 0-7 UNITS TIDWMEALS SQ ;  Start 20 at 08:00; 

Stop 20 at 08:00;  Status DC


Dextrose (Dextrose 50%-Water Syringe) 12.5 gm PRN Q15MIN  PRN IV SEE COMMENTS;  

Start 20 at 08:00;  Status Cancel


Insulin Human Lispro (HumaLOG) 0-9 UNITS TIDWMEALS SQ  Last administered on 

20at 08:07;  Start 20 at 08:00


Dextrose (Dextrose 50%-Water Syringe) 12.5 gm PRN Q15MIN  PRN IV SEE COMMENTS;  

Start 20 at 08:00





Active Scripts


Active


Reported


Zyrtec (Cetirizine Hcl) 10 Mg Capsule 10 Mg PO DAILY


Methotrexate (Methotrexate Sodium) 2.5 Mg Tablet 8 Tab PO WEEKLY


Protonix  ** (Pantoprazole Sodium) 40 Mg Tablet.dr 40 Mg PO DAILYAC


Folic Acid 0.8 Mg Capsule 1 Cap PO DAILY 30 Days


Ferrous Sulfate 325 Mg Tablet 1 Tab PO DAILY


Vitals/I & O





Vital Sign - Last 24 Hours








 20





 11:00 11:42 12:12 15:00


 


Temp 98.6   98.3





 98.6   98.3


 


Pulse 78   61


 


Resp 20   20


 


B/P (MAP) 132/78 (96)   160/78 (105)


 


Pulse Ox 96   96


 


O2 Delivery Room Air Room Air Room Air Room Air


 


    





    





 20





 16:21 16:51 19:00 20:00


 


Temp   98.3 





   98.3 


 


Pulse   79 


 


Resp   18 


 


B/P (MAP)   158/82 (107) 


 


Pulse Ox   96 


 


O2 Delivery Room Air Room Air Room Air Room Air


 


O2 Flow Rate    2.0


 


    





    





 20





 23:00 03:00 07:00 09:05


 


Temp 97.7 97.5 98.2 





 97.7 97.5 98.2 


 


Pulse 76 77 96 


 


Resp 18 18 18 


 


B/P (MAP) 139/70 (93) 131/79 (96) 120/76 (91) 


 


Pulse Ox 94 94 96 


 


O2 Delivery Room Air Room Air Room Air Room Air














Intake and Output   


 


 20





 15:00 23:00 07:00


 


Intake Total   30 ml


 


Output Total  475 ml 


 


Balance  -475 ml 30 ml











Nutrition Consultation


Dietary Evaluation:


Recommendations by RD:  Dietary education by RD, Increase Calorie Intake, 

PPN/TPN


Comments:  


REC TPN per followin g dextrose, 65 g AA, 20 g lipids





REC advance diet as tolerated, goal diet gluten free/ADA (A1c 13.6)  


w/protein supplements per pt choice


Expected Outcomes/Goals:  


TPN to meet 65% - 7% % est needs while NPO





diet advancement





Malnutrition Findings:


Food and Nutrition Intake (Mod:  <75% est energy req 7days


Weight Status:  Appropriate





Hemodynamically unstable?:  No


Is patient in severe pain?:  No


Is NPO status required?:  No











PRITI HANSON MD           2020 09:16

## 2020-06-05 NOTE — PDOC
SURGICAL PROGRESS NOTE


Subjective


Pt feels better, no n/v, notes minimal pain, except with movement.


Vital Signs





Vital Signs








  Date Time  Temp Pulse Resp B/P (MAP) Pulse Ox O2 Delivery O2 Flow Rate FiO2


 


6/5/20 07:00 98.2 96 18 120/76 (91) 96 Room Air  





 98.2       


 


6/4/20 20:00       2.0 








I&O











Intake and Output 


 


 6/5/20





 07:00


 


Intake Total 30 ml


 


Output Total 475 ml


 


Balance -445 ml


 


 


 


Intake Oral 30 ml


 


Output Urine Total 475 ml


 


# Voids 4








General:  Alert, Oriented X3, Cooperative, No acute distress


Abdomen:  Soft, No tenderness


Labs





Laboratory Tests








Test


 6/3/20


11:25 6/3/20


16:43 6/3/20


20:21 6/4/20


04:05


 


Glucose (Fingerstick)


 145 mg/dL


(70-99) 132 mg/dL


(70-99) 148 mg/dL


(70-99) 





 


White Blood Count


 


 


 


 1.6 x10^3/uL


(4.0-11.0)


 


Red Blood Count


 


 


 


 4.22 x10^6/uL


(3.50-5.40)


 


Hemoglobin


 


 


 


 11.7 g/dL


(12.0-15.5)


 


Hematocrit


 


 


 


 35.6 %


(36.0-47.0)


 


Mean Corpuscular Volume    85 fL () 


 


Mean Corpuscular Hemoglobin    28 pg (25-35) 


 


Mean Corpuscular Hemoglobin


Concent 


 


 


 33 g/dL


(31-37)


 


Red Cell Distribution Width


 


 


 


 15.5 %


(11.5-14.5)


 


Platelet Count


 


 


 


 280 x10^3/uL


(140-400)


 


Neutrophils (%) (Auto)    32 % (31-73) 


 


Lymphocytes (%) (Auto)    37 % (24-48) 


 


Monocytes (%) (Auto)    28 % (0-9) 


 


Eosinophils (%) (Auto)    2 % (0-3) 


 


Basophils (%) (Auto)    1 % (0-3) 


 


Neutrophils # (Auto)


 


 


 


 0.5 x10^3/uL


(1.8-7.7)


 


Lymphocytes # (Auto)


 


 


 


 0.6 x10^3/uL


(1.0-4.8)


 


Monocytes # (Auto)


 


 


 


 0.4 x10^3/uL


(0.0-1.1)


 


Eosinophils # (Auto)


 


 


 


 0.0 x10^3/uL


(0.0-0.7)


 


Basophils # (Auto)


 


 


 


 0.0 x10^3/uL


(0.0-0.2)


 


Prothrombin Time


 


 


 


 13.6 SEC


(11.7-14.0)


 


Prothromb Time International


Ratio 


 


 


 1.1 (0.8-1.1) 





 


Sodium Level


 


 


 


 140 mmol/L


(136-145)


 


Potassium Level


 


 


 


 3.5 mmol/L


(3.5-5.1)


 


Chloride Level


 


 


 


 100 mmol/L


()


 


Carbon Dioxide Level


 


 


 


 22 mmol/L


(21-32)


 


Anion Gap    18 (6-14) 


 


Blood Urea Nitrogen    4 mg/dL (7-20) 


 


Creatinine


 


 


 


 0.6 mg/dL


(0.6-1.0)


 


Estimated GFR


(Cockcroft-Gault) 


 


 


 102.7 





 


Glucose Level


 


 


 


 166 mg/dL


(70-99)


 


Calcium Level


 


 


 


 8.6 mg/dL


(8.5-10.1)


 


Phosphorus Level


 


 


 


 4.1 mg/dL


(2.6-4.7)


 


Magnesium Level


 


 


 


 1.6 mg/dL


(1.8-2.4)


 


Triglycerides Level


 


 


 


 262 mg/dL


(0-150)


 


Test


 6/4/20


07:31 6/4/20


11:59 6/4/20


16:51 6/4/20


20:26


 


Glucose (Fingerstick)


 146 mg/dL


(70-99) 127 mg/dL


(70-99) 140 mg/dL


(70-99) 131 mg/dL


(70-99)


 


Test


 6/4/20


23:54 6/5/20


05:59 6/5/20


06:45 





 


Glucose (Fingerstick)


 188 mg/dL


(70-99) 251 mg/dL


(70-99) 


 





 


Sodium Level


 


 


 135 mmol/L


(136-145) 





 


Potassium Level


 


 


 3.1 mmol/L


(3.5-5.1) 





 


Chloride Level


 


 


 101 mmol/L


() 





 


Carbon Dioxide Level


 


 


 27 mmol/L


(21-32) 





 


Anion Gap   7 (6-14)  


 


Blood Urea Nitrogen   8 mg/dL (7-20)  


 


Creatinine


 


 


 0.6 mg/dL


(0.6-1.0) 





 


Estimated GFR


(Cockcroft-Gault) 


 


 102.7 


 





 


Glucose Level


 


 


 268 mg/dL


(70-99) 





 


Calcium Level


 


 


 8.1 mg/dL


(8.5-10.1) 





 


Phosphorus Level


 


 


 3.7 mg/dL


(2.6-4.7) 





 


Magnesium Level


 


 


 1.7 mg/dL


(1.8-2.4) 











Laboratory Tests








Test


 6/4/20


11:59 6/4/20


16:51 6/4/20


20:26 6/4/20


23:54


 


Glucose (Fingerstick)


 127 mg/dL


(70-99) 140 mg/dL


(70-99) 131 mg/dL


(70-99) 188 mg/dL


(70-99)


 


Test


 6/5/20


05:59 6/5/20


06:45 


 





 


Glucose (Fingerstick)


 251 mg/dL


(70-99) 


 


 





 


Sodium Level


 


 135 mmol/L


(136-145) 


 





 


Potassium Level


 


 3.1 mmol/L


(3.5-5.1) 


 





 


Chloride Level


 


 101 mmol/L


() 


 





 


Carbon Dioxide Level


 


 27 mmol/L


(21-32) 


 





 


Anion Gap  7 (6-14)   


 


Blood Urea Nitrogen  8 mg/dL (7-20)   


 


Creatinine


 


 0.6 mg/dL


(0.6-1.0) 


 





 


Estimated GFR


(Cockcroft-Gault) 


 102.7 


 


 





 


Glucose Level


 


 268 mg/dL


(70-99) 


 





 


Calcium Level


 


 8.1 mg/dL


(8.5-10.1) 


 





 


Phosphorus Level


 


 3.7 mg/dL


(2.6-4.7) 


 





 


Magnesium Level


 


 1.7 mg/dL


(1.8-2.4) 


 











Problem List


Problems


Medical Problems:


(1) Dehydration


Status: Acute  





(2) Hyperglycemia


Status: Acute  





(3) Pancreatitis, acute


Status: Acute  








Assessment/Plan


pancreatitis


will ask hematology to comment on low WBC


will ask GI if OK to start clears


no surgical plans.





Justicifation of Admission Dx:


Justifications for Admission:


Justification of Admission Dx:  Yes











PAYAL HAYDEN MD              Jun 5, 2020 08:38

## 2020-06-05 NOTE — PDOC
Progress Note


Current Problem List


Problems:  


(1) Leukopenia due to antineoplastic chemotherapy





Subjective


Subjective


Patient is feeling better.  Walking around.





ROS


ROS


No nausea


No vomiting


No pain


No rash





Vital Sign


Vital Signs





Vital Signs








  Date Time  Temp Pulse Resp B/P (MAP) Pulse Ox O2 Delivery O2 Flow Rate FiO2


 


20 11:00 98.1 86 18 120/72 (88) 98 Room Air  





 98.1       


 


20 20:00       2.0 











Physical Exam


PHYSICAL EXAM


GENERAL:  Alert, oriented female, not in distress.


VITAL SIGNS:  Stable, afebrile.


HEENT:  NAD.


NECK:  Supple.  No JVP.  No lymphadenopathy.


LUNGS:  Clear.


HEART:  S1, S2 regular.


ABDOMEN:  Soft, nontender.  No organomegaly.


EXTREMITIES:  No edema, cyanosis.


SKIN:  Unremarkable.


NEUROLOGIC:  The patient is alert, awake and appropriate.  No focal neurologic


deficit.





Labs


Lab





Laboratory Tests








Test


 20


16:51 20


20:26 20


23:54 20


05:59


 


Glucose (Fingerstick)


 140 mg/dL


(70-99) 131 mg/dL


(70-99) 188 mg/dL


(70-99) 251 mg/dL


(70-99)


 


Test


 20


06:45 20


11:37 


 





 


White Blood Count


 1.8 x10^3/uL


(4.0-11.0) 


 


 





 


Red Blood Count


 4.06 x10^6/uL


(3.50-5.40) 


 


 





 


Hemoglobin


 11.2 g/dL


(12.0-15.5) 


 


 





 


Hematocrit


 33.9 %


(36.0-47.0) 


 


 





 


Mean Corpuscular Volume 84 fL ()    


 


Mean Corpuscular Hemoglobin 28 pg (25-35)    


 


Mean Corpuscular Hemoglobin


Concent 33 g/dL


(31-37) 


 


 





 


Red Cell Distribution Width


 15.4 %


(11.5-14.5) 


 


 





 


Platelet Count


 287 x10^3/uL


(140-400) 


 


 





 


Sodium Level


 135 mmol/L


(136-145) 


 


 





 


Potassium Level


 3.1 mmol/L


(3.5-5.1) 


 


 





 


Chloride Level


 101 mmol/L


() 


 


 





 


Carbon Dioxide Level


 27 mmol/L


(21-32) 


 


 





 


Anion Gap 7 (6-14)    


 


Blood Urea Nitrogen 8 mg/dL (7-20)    


 


Creatinine


 0.6 mg/dL


(0.6-1.0) 


 


 





 


Estimated GFR


(Cockcroft-Gault) 102.7 


 


 


 





 


Glucose Level


 268 mg/dL


(70-99) 


 


 





 


Calcium Level


 8.1 mg/dL


(8.5-10.1) 


 


 





 


Phosphorus Level


 3.7 mg/dL


(2.6-4.7) 


 


 





 


Magnesium Level


 1.7 mg/dL


(1.8-2.4) 


 


 





 


Glucose (Fingerstick)


 


 113 mg/dL


(70-99) 


 














Objective


Assessment


Hem/onc consult brief note.





Reason for consultation: Leukopenia. 





Full note to follow. 





59 year-old female admitted with acute pancreatitis in the setting of markedly 

elevated triglycerides (> 3,000) and adult onset Still's disease on 

Methotrexate.





WBC 6.3 on 2020, 4.5 on 2020, 1.8 -2020.





ANC 0.3 on 2020, 0.5 on 2020. 





No significant cytopenias in other cell lines. 





Although neutropenia could be related to recent Methotrexate therapy, the 

patient has been on Methotrexate for approximately 1 year and came in with 

normal WBC. Appropriately, Methotrexate has been discontinued, the patient was 

started on Leucovorin rescue. 





Occasionally, severe pancreatitis could be associated with neutropenia, due to 

sequestration of neutrophils in and around pancreas.However, patient has stable 

vital signs, no significant third spacing, and no other indicators of extremely 

severe pancreatitis.   





Severe neutropenia could also be associated with Still's disease if it's assoc

iated with macrophage activation syndrome (MAS).





Of interest, markedly elevated triglycerides levels are frequently present if 

Still's disease is associated with MAS.





Plan


Plan of Care


Suggest 


1. Rheumatology consult. 


2. High doses of steroids, such as solumedrol 1 gram IV x 2-3 days. 


3. Check ferritin level today. 


4. Other interventions will likely be needed depending on rheumatology 

input/ferritin levels.





Justicifation of Admission Dx:


Justifications for Admission:


Justification of Admission Dx:  Yes





Nutrition Consultation


Dietary Evaluation:


Recommendations by RD:  Dietary education by RD, Increase Calorie Intake, 

PPN/TPN


Comments:  


REC TPN per followin g dextrose, 65 g AA, 20 g lipids





REC advance diet as tolerated, goal diet gluten free/ADA (A1c 13.6)  


w/protein supplements per pt choice


Expected Outcomes/Goals:  


TPN to meet 65% - 7% % est needs while NPO





diet advancement





Malnutrition Findings:


Food and Nutrition Intake (Mod:  <75% est energy req 7days


Weight Status:  Appropriate











SERGE PICHARDO MD             2020 13:29

## 2020-06-05 NOTE — NUR
Dr. Pitts paged re: inability to contact insurance company to find information on what 
is needed for tx to [facility]. Jennifer,RN nursing supervisor notified. Stated she would obtain a bed 
for pt on 6S.

## 2020-06-05 NOTE — PDOC2
CONSULT


Date of Consult


Date of Consult


DATE: 6/5/20 


TIME: 14:30





Reason for Consult


Reason for Consult:


Neutropenia





Referring Physician


Referring Physician:


Dr. Diego Pitts





Source


Source:  Chart review, Patient





History of Present Illness


Reason for Visit:


Mrs. Narda Almanza is a 59 year-old female w/ PMHx celiac disease, adult onset 

Still's disease, anemia, asthma, NATO on CPAP, GERD who presented to [facility] on 5/26/2020 with severe upper abdominal pain, nausea, vomiting, 

fatigue. She was found to have acute pancreatitis. The patient has been getting 

conservative therapy for that. When she came in, her blood counts were normal. 

She denied fever. The patient's abdominal pain has improved. Methotrexate has 

been stopped. Last abdominal CT showed acute pancreatitis with no necrosis.  

There is increase


in the fluid collection. Small pleural effusion. 





Narda is being seen in consultation at the request of Dr. Diego Pitts for 

evaluation of neutropenia. 





Today, Narda is seen via telecommunications platform. She reports at the time 

she was diagnosed with Still's disease, she had significant swelling "in all of 

her joints", a "light colored" rash, and fevers of 103 and 104. She reports "an

ytime she gets under stress" she will develop a rash. She has been followed by 

Dr. Bhakta, rheumatology at . She had been taking Methotrexate 20 mg once weekly

and Prednisone 3 mg daily. She reports she was diagnosed with celiac disease in 

~ 3/2020 and has been modifying her diet to include all gluten-free foods. She 

has increased her intake of pasta with gluten free noodles,spaghetti sauce, and 

sausage. 





She denies any recent rashes or joint achiness. 





She reports she has been seen by Dr. Talia Morris, Hematology/Oncology at [facility], more recently ~ 2-3 months ago.





Past Medical History


Pulmonary:  Asthma, Other (NATO on CPAP)


GI:  GERD, Other (Celiac disease)


Heme/Onc:  Anemia NOS


Rheumatologic:  Other (Stills disease)


Dermatology:  No pertinent hx





Past Surgical History


Past Surgical History:  Cholecystectomy, Hysterectomy, Other (Bilateral bunion

ectomy, right shoulder arthroscopy)





Family History


Family History:  Diabetes





Social History


Quit


ALCOHOL:  none


Drugs:  None





Current Problem List


Problem List


Problems


Medical Problems:


(1) Dehydration


Status: Acute  





(2) Hyperglycemia


Status: Acute  





(3) Pancreatitis, acute


Status: Acute  











Current Medications


Current Medications





Current Medications


Iohexol (Omnipaque 300 Mg/ml) 75 ml 1X  ONCE IV  Last administered on 5/26/20at 

11:30;  Start 5/26/20 at 11:30;  Stop 5/26/20 at 11:31;  Status DC


Iohexol (Omnipaque 240 Mg/ml) 50 ml 1X  ONCE PO  Last administered on 5/26/20at 

11:30;  Start 5/26/20 at 11:30;  Stop 5/26/20 at 11:31;  Status DC


Info (CONTRAST GIVEN -- Rx MONITORING) 1 each PRN DAILY  PRN MC SEE COMMENTS;  

Start 5/26/20 at 11:30;  Stop 5/28/20 at 11:29;  Status DC


Sodium Chloride 1,000 ml @  0 mls/hr 1X  ONCE IV  Last administered on 5/26/20at

12:27;  Start 5/26/20 at 12:15;  Stop 5/26/20 at 12:16;  Status DC


Morphine Sulfate (Morphine Sulfate) 5 mg 1X  ONCE IV  Last administered on 

5/26/20at 12:45;  Start 5/26/20 at 12:30;  Stop 5/26/20 at 12:33;  Status DC


Morphine Sulfate (Morphine Sulfate) 5 mg 1X  ONCE IV ;  Start 5/26/20 at 13:45; 

Stop 5/26/20 at 13:46;  Status DC


Morphine Sulfate (Morphine Sulfate) 4 mg PRN Q2HR  PRN IV MODERATE TO SEVERE 

PAIN Last administered on 5/28/20at 16:18;  Start 5/26/20 at 14:45;  Stop 6/1/20

at 10:23;  Status DC


Sodium Chloride 1,000 ml @  100 mls/hr Q10H IV  Last administered on 6/1/20at 

08:27;  Start 5/26/20 at 14:44;  Stop 6/1/20 at 10:23;  Status DC


Ondansetron HCl (Zofran) 4 mg PRN Q4HRS  PRN IV NAUSEA/VOMITING Last 

administered on 6/2/20at 14:06;  Start 5/26/20 at 14:45


Acetaminophen (Tylenol Supp) 650 mg PRN Q4HRS  PRN IA TEMP OVER 100.4F OR MILD 

PAIN;  Start 5/26/20 at 14:45


Enoxaparin Sodium (Lovenox 40mg Syringe) 40 mg Q24H SQ  Last administered on 

6/5/20at 14:13;  Start 5/26/20 at 15:00


Insulin Human Lispro (HumaLOG) 0-9 UNITS Q4HRS SQ  Last administered on 

5/30/20at 18:15;  Start 5/26/20 at 16:00;  Stop 5/30/20 at 19:32;  Status DC


Dextrose (Dextrose 50%-Water Syringe) 12.5 gm PRN Q15MIN  PRN IV SEE COMMENTS;  

Start 5/26/20 at 14:45;  Stop 6/5/20 at 13:20;  Status DC


Albuterol Sulfate (Ventolin Neb Soln) 3 mg PRN QID  PRN NEB SHORTNESS OF BREATH;

 Start 5/26/20 at 15:15


Sodium Chloride (Saline Mist Nasal) 1 néstor PRN Q1HR  PRN NS NASAL CONGESTION;  

Start 5/26/20 at 15:15


Methylprednisolone Sodium Succinate (SOLU-Medrol 40MG VIAL) 40 mg DAILY IV  Last

administered on 5/28/20at 08:21;  Start 5/26/20 at 15:15;  Stop 5/28/20 at 

13:44;  Status DC


Insulin Human Regular 100 unit/ Sodium Chloride 101 ml @ 0 mls/hr CONT  PRN IV 

SEE I/O RECORD Last administered on 5/27/20at 02:12;  Start 5/26/20 at 18:00


Fentanyl Citrate (Fentanyl 2ml Vial) 50 mcg PRN Q2HR  PRN IVP MOD TO SEVERE 

PAIN, 2ND CHOICE Last administered on 6/4/20at 16:21;  Start 5/27/20 at 11:45


Famotidine (Pepcid Vial) 20 mg QHS IVP  Last administered on 5/31/20at 22:14;  

Start 5/28/20 at 21:00;  Stop 6/1/20 at 10:21;  Status DC


Prednisone (Prednisone) 3 mg DAILY PO  Last administered on 6/5/20at 08:55;  

Start 5/29/20 at 09:00


Potassium Chloride/Water 100 ml @  100 mls/hr Q1H IV  Last administered on 

5/30/20at 15:22;  Start 5/30/20 at 11:00;  Stop 5/30/20 at 14:59;  Status DC


Potassium Chloride (Klor-Con) 40 meq 1X  ONCE PO  Last administered on 5/30/20at

11:01;  Start 5/30/20 at 10:15;  Stop 5/30/20 at 10:22;  Status DC


Tramadol HCl (Ultram) 50 mg PRN Q6HRS  PRN PO MODERATE PAIN 4-6 Last 

administered on 6/2/20at 06:08;  Start 5/30/20 at 10:15


Oxycodone HCl (Roxicodone) 5 mg PRN Q6HRS  PRN PO SEVERE PAIN 7-10 Last 

administered on 5/30/20at 11:08;  Start 5/30/20 at 10:15;  Stop 5/30/20 at 

15:30;  Status DC


Magnesium Sulfate 100 ml @  25 mls/hr 1X  ONCE IV  Last administered on 

5/30/20at 11:06;  Start 5/30/20 at 11:00;  Stop 5/30/20 at 14:59;  Status DC


Magnesium Hydroxide (Milk Of Magnesia) 2,400 mg PRN DAILY  PRN PO CONSTIPATION 

2ND CHOICE;  Start 5/30/20 at 15:30


Psyllium Hydrophilic Mucilloid (Metamucil Fiber Packet) 1 pkt DAILY PO  Last 

administered on 6/1/20at 08:28;  Start 5/30/20 at 15:30


Polyethylene Glycol (miraLAX PACKET) 17 gm DAILY PO  Last administered on 

6/1/20at 08:28;  Start 5/30/20 at 15:30


Oxycodone HCl (Roxicodone) 5 mg PRN Q4HRS  PRN PO SEVERE PAIN 7-10 Last 

administered on 6/5/20at 16:24;  Start 5/30/20 at 15:30


Insulin Human Lispro (HumaLOG) 0-9 UNITS TIDWMEALHC SQ  Last administered on 

6/1/20at 17:14;  Start 5/30/20 at 19:30;  Stop 6/5/20 at 06:31;  Status DC


Famotidine (Pepcid) 20 mg QHS PO  Last administered on 6/1/20at 20:57;  Start 

6/1/20 at 21:00;  Stop 6/2/20 at 10:56;  Status DC


Iohexol (Omnipaque 300 Mg/ml) 75 ml 1X  ONCE IV  Last administered on 6/1/20at 

11:45;  Start 6/1/20 at 11:45;  Stop 6/1/20 at 11:46;  Status DC


Info (CONTRAST GIVEN -- Rx MONITORING) 1 each PRN DAILY  PRN MC SEE COMMENTS;  

Start 6/1/20 at 11:45;  Stop 6/3/20 at 11:44;  Status DC


Ringer's Solution 1,000 ml @  75 mls/hr [id number] IV  Last administered on 

6/4/20at 01:53;  Start 6/2/20 at 09:30


Potassium Chloride/Water 100 ml @  100 mls/hr Q1H IV  Last administered on 

6/2/20at 21:27;  Start 6/2/20 at 10:00;  Stop 6/2/20 at 21:59;  Status DC


Famotidine (Pepcid Vial) 20 mg QHS IVP  Last administered on 6/4/20at 22:10;  

Start 6/2/20 at 21:00


Bisacodyl (Dulcolax Supp) 10 mg PRN DAILY  PRN IA CONSTIPATION;  Start 6/2/20 at

11:00


Leucovorin Calcium (Wellcovorin) 5 mg DAILY PO  Last administered on 6/3/20at 

09:53;  Start 6/3/20 at 10:00;  Stop 6/4/20 at 09:24;  Status DC


Info (Tpn Per Pharmacy) 1 each PRN DAILY  PRN MC SEE COMMENTS Last administered 

on 6/5/20at 13:31;  Start 6/3/20 at 12:00


Potassium Chloride/Water 100 ml @  100 mls/hr Q1H IV  Last administered on 

6/4/20at 01:53;  Start 6/3/20 at 20:00;  Stop 6/3/20 at 23:59;  Status DC


Leucovorin Calcium (Wellcovorin) 15 mg Q8HRS PO  Last administered on 6/5/20at 

14:06;  Start 6/4/20 at 10:00


Lidocaine HCl (Buffered Lidocaine 1%) 3 ml 1X  ONCE INJ ;  Start 6/4/20 at 

12:00;  Stop 6/4/20 at 12:01;  Status DC


Lidocaine HCl (Buffered Lidocaine 1%) 3 ml STK-MED ONCE .ROUTE ;  Start 6/4/20 

at 11:58;  Stop 6/4/20 at 11:59;  Status DC


Sodium Chloride 90 meq/Potassium Chloride 50 meq/ Potassium Phosphate 13.6 

mmol/Magnesium Sulfate 10 meq/ Calcium Gluconate 10 meq/ Multivitamins 10 

ml/Chromium/ Copper/Manganese/ Seleni/Zn 1 ml/ Total Parenteral Nutrition/Amino 

Acids/Dextrose/ Fat Emulsion Intravenous 1,512 ml @  63 mls/hr TPN  CONT IV  

Last administered on 6/4/20at 22:12;  Start 6/4/20 at 22:00;  Stop 6/5/20 at 

21:59


Magnesium Sulfate/ Dextrose 100 ml @  100 mls/hr 1X  ONCE IV  Last administered 

on 6/4/20at 16:16;  Start 6/4/20 at 16:00;  Stop 6/4/20 at 16:59;  Status DC


Insulin Human Lispro (HumaLOG) 0-5 UNITS TIDWMEALS SQ ;  Start 6/5/20 at 08:00; 

Status Cancel


Dextrose (Dextrose 50%-Water Syringe) 12.5 gm PRN Q15MIN  PRN IV SEE COMMENTS;  

Start 6/5/20 at 07:45;  Status Cancel


Insulin Human Lispro (HumaLOG) 0-7 UNITS TIDWMEALS SQ ;  Start 6/5/20 at 08:00; 

Stop 6/5/20 at 08:00;  Status DC


Dextrose (Dextrose 50%-Water Syringe) 12.5 gm PRN Q15MIN  PRN IV SEE COMMENTS;  

Start 6/5/20 at 08:00;  Status Cancel


Insulin Human Lispro (HumaLOG) 0-9 UNITS TIDWMEALS SQ  Last administered on 

6/5/20at 17:34;  Start 6/5/20 at 08:00


Dextrose (Dextrose 50%-Water Syringe) 12.5 gm PRN Q15MIN  PRN IV SEE COMMENTS;  

Start 6/5/20 at 08:00;  Stop 6/5/20 at 13:17;  Status DC


Potassium Chloride (Klor-Con) 40 meq 1X  ONCE PO  Last administered on 6/5/20at 

11:03;  Start 6/5/20 at 10:45;  Stop 6/5/20 at 10:46;  Status DC


Potassium Chloride (Klor-Con) 20 meq DAILYWBKFT PO ;  Start 6/6/20 at 08:00


Magnesium Sulfate 50 ml @ 25 mls/hr 1X  ONCE IV  Last administered on 6/5/20at 

11:04;  Start 6/5/20 at 10:45;  Stop 6/5/20 at 12:44;  Status DC


Dextrose (Dextrose 50%-Water Syringe) 12.5 gm PRN Q15MIN  PRN IV SEE COMMENTS;  

Start 6/5/20 at 13:30


Sodium Chloride 90 meq/Potassium Chloride 70 meq/ Potassium Phosphate 13.6 

mmol/Magnesium Sulfate 18 meq/ Calcium Gluconate 10 meq/ Multivitamins 10 

ml/Chromium/ Copper/Manganese/ Seleni/Zn 1 ml/ Total Parenteral Nutrition/Amino 

Acids/Dextrose/ Fat Emulsion Intravenous 1,512 ml @  63 mls/hr TPN  CONT IV ;  

Start 6/5/20 at 22:00;  Stop 6/6/20 at 21:59


Methylprednisolone Sodium Succinate (SOLU-Medrol 125MG VIAL) 1,000 mg DAILY08 IV

;  Start 6/6/20 at 08:00;  Stop 6/8/20 at 07:59;  Status UNV


Methylprednisolone Sodium Succinate 1000 mg/Sodium Chloride 100 ml @  100 mls/hr

Q24H IV ;  Start 6/6/20 at 08:00;  Stop 6/8/20 at 07:59





Active Scripts


Active


Reported


Zyrtec (Cetirizine Hcl) 10 Mg Capsule 10 Mg PO DAILY


Methotrexate (Methotrexate Sodium) 2.5 Mg Tablet 8 Tab PO WEEKLY


Protonix  ** (Pantoprazole Sodium) 40 Mg Tablet.dr 40 Mg PO DAILYAC


Folic Acid 0.8 Mg Capsule 1 Cap PO DAILY 30 Days


Ferrous Sulfate 325 Mg Tablet 1 Tab PO DAILY





Allergies


Allergies:  


Coded Allergies:  


     coconut (Verified  Allergy, Intermediate, HIVES, 5/26/20)





Physical Exam


General:  Alert, Oriented X3, Cooperative, No acute distress


HEENT:  PERRLA


Lungs:  Other


Heart:  Regular rate


Skin:  No rashes


Neuro:  Normal gait


Psych/Mental Status:  Mental status NL


MUSCULOSKELETAL:  No joint tenderness





Vitals


VITALS





Vital Signs








  Date Time  Temp Pulse Resp B/P (MAP) Pulse Ox O2 Delivery O2 Flow Rate FiO2


 


6/5/20 17:29      Room Air  


 


6/5/20 15:00 98.1 82 18 140/71 (94) 98   





 98.1       


 


6/4/20 20:00       2.0 











Labs


Labs





Laboratory Tests








Test


 6/3/20


20:21 6/4/20


04:05 6/4/20


07:31 6/4/20


11:59


 


Glucose (Fingerstick)


 148 mg/dL


(70-99) 


 146 mg/dL


(70-99) 127 mg/dL


(70-99)


 


White Blood Count


 


 1.6 x10^3/uL


(4.0-11.0) 


 





 


Red Blood Count


 


 4.22 x10^6/uL


(3.50-5.40) 


 





 


Hemoglobin


 


 11.7 g/dL


(12.0-15.5) 


 





 


Hematocrit


 


 35.6 %


(36.0-47.0) 


 





 


Mean Corpuscular Volume  85 fL ()   


 


Mean Corpuscular Hemoglobin  28 pg (25-35)   


 


Mean Corpuscular Hemoglobin


Concent 


 33 g/dL


(31-37) 


 





 


Red Cell Distribution Width


 


 15.5 %


(11.5-14.5) 


 





 


Platelet Count


 


 280 x10^3/uL


(140-400) 


 





 


Neutrophils (%) (Auto)  32 % (31-73)   


 


Lymphocytes (%) (Auto)  37 % (24-48)   


 


Monocytes (%) (Auto)  28 % (0-9)   


 


Eosinophils (%) (Auto)  2 % (0-3)   


 


Basophils (%) (Auto)  1 % (0-3)   


 


Neutrophils # (Auto)


 


 0.5 x10^3/uL


(1.8-7.7) 


 





 


Lymphocytes # (Auto)


 


 0.6 x10^3/uL


(1.0-4.8) 


 





 


Monocytes # (Auto)


 


 0.4 x10^3/uL


(0.0-1.1) 


 





 


Eosinophils # (Auto)


 


 0.0 x10^3/uL


(0.0-0.7) 


 





 


Basophils # (Auto)


 


 0.0 x10^3/uL


(0.0-0.2) 


 





 


Prothrombin Time


 


 13.6 SEC


(11.7-14.0) 


 





 


Prothromb Time International


Ratio 


 1.1 (0.8-1.1) 


 


 





 


Sodium Level


 


 140 mmol/L


(136-145) 


 





 


Potassium Level


 


 3.5 mmol/L


(3.5-5.1) 


 





 


Chloride Level


 


 100 mmol/L


() 


 





 


Carbon Dioxide Level


 


 22 mmol/L


(21-32) 


 





 


Anion Gap  18 (6-14)   


 


Blood Urea Nitrogen  4 mg/dL (7-20)   


 


Creatinine


 


 0.6 mg/dL


(0.6-1.0) 


 





 


Estimated GFR


(Cockcroft-Gault) 


 102.7 


 


 





 


Glucose Level


 


 166 mg/dL


(70-99) 


 





 


Calcium Level


 


 8.6 mg/dL


(8.5-10.1) 


 





 


Phosphorus Level


 


 4.1 mg/dL


(2.6-4.7) 


 





 


Magnesium Level


 


 1.6 mg/dL


(1.8-2.4) 


 





 


Triglycerides Level


 


 262 mg/dL


(0-150) 


 





 


Test


 6/4/20


16:51 6/4/20


20:26 6/4/20


23:54 6/5/20


05:59


 


Glucose (Fingerstick)


 140 mg/dL


(70-99) 131 mg/dL


(70-99) 188 mg/dL


(70-99) 251 mg/dL


(70-99)


 


Test


 6/5/20


06:45 6/5/20


11:37 6/5/20


14:45 6/5/20


16:00


 


White Blood Count


 1.8 x10^3/uL


(4.0-11.0) 


 1.5 x10^3/uL


(4.0-11.0) 





 


Red Blood Count


 4.06 x10^6/uL


(3.50-5.40) 


 4.31 x10^6/uL


(3.50-5.40) 





 


Hemoglobin


 11.2 g/dL


(12.0-15.5) 


 12.3 g/dL


(12.0-15.5) 





 


Hematocrit


 33.9 %


(36.0-47.0) 


 38.0 %


(36.0-47.0) 





 


Mean Corpuscular Volume 84 fL ()   88 fL ()  


 


Mean Corpuscular Hemoglobin 28 pg (25-35)   29 pg (25-35)  


 


Mean Corpuscular Hemoglobin


Concent 33 g/dL


(31-37) 


 32 g/dL


(31-37) 





 


Red Cell Distribution Width


 15.4 %


(11.5-14.5) 


 16.3 %


(11.5-14.5) 





 


Platelet Count


 287 x10^3/uL


(140-400) 


 265 x10^3/uL


(140-400) 





 


Sodium Level


 135 mmol/L


(136-145) 


 


 138 mmol/L


(136-145)


 


Potassium Level


 3.1 mmol/L


(3.5-5.1) 


 


 3.7 mmol/L


(3.5-5.1)


 


Chloride Level


 101 mmol/L


() 


 


 100 mmol/L


()


 


Carbon Dioxide Level


 27 mmol/L


(21-32) 


 


 27 mmol/L


(21-32)


 


Anion Gap 7 (6-14)    11 (6-14) 


 


Blood Urea Nitrogen 8 mg/dL (7-20)    7 mg/dL (7-20) 


 


Creatinine


 0.6 mg/dL


(0.6-1.0) 


 


 0.7 mg/dL


(0.6-1.0)


 


Estimated GFR


(Cockcroft-Gault) 102.7 


 


 


 85.9 





 


Glucose Level


 268 mg/dL


(70-99) 


 


 266 mg/dL


(70-99)


 


Calcium Level


 8.1 mg/dL


(8.5-10.1) 


 


 8.5 mg/dL


(8.5-10.1)


 


Phosphorus Level


 3.7 mg/dL


(2.6-4.7) 


 


 





 


Magnesium Level


 1.7 mg/dL


(1.8-2.4) 


 


 





 


Ferritin


 972 ng/mL


(8-252) 


 


 





 


C-Reactive Protein,


Quantitative 44.5 mg/L


(0-3.3) 


 


 





 


Glucose (Fingerstick)


 


 113 mg/dL


(70-99) 


 





 


Neutrophils (%) (Auto)   45 % (31-73)  


 


Lymphocytes (%) (Auto)   29 % (24-48)  


 


Monocytes (%) (Auto)   24 % (0-9)  


 


Eosinophils (%) (Auto)   1 % (0-3)  


 


Basophils (%) (Auto)   1 % (0-3)  


 


Neutrophils # (Auto)


 


 


 0.7 x10^3/uL


(1.8-7.7) 





 


Lymphocytes # (Auto)


 


 


 0.4 x10^3/uL


(1.0-4.8) 





 


Monocytes # (Auto)


 


 


 0.4 x10^3/uL


(0.0-1.1) 





 


Eosinophils # (Auto)


 


 


 0.0 x10^3/uL


(0.0-0.7) 





 


Basophils # (Auto)


 


 


 0.0 x10^3/uL


(0.0-0.2) 





 


BUN/Creatinine Ratio    10 (6-20) 


 


Total Bilirubin


 


 


 


 0.4 mg/dL


(0.2-1.0)


 


Aspartate Amino Transf


(AST/SGOT) 


 


 


 17 U/L (15-37) 





 


Alanine Aminotransferase


(ALT/SGPT) 


 


 


 23 U/L (14-59) 





 


Alkaline Phosphatase


 


 


 


 104 U/L


()


 


Total Protein


 


 


 


 7.7 g/dL


(6.4-8.2)


 


Albumin


 


 


 


 3.0 g/dL


(3.4-5.0)


 


Albumin/Globulin Ratio    0.6 (1.0-1.7) 


 


Test


 6/5/20


17:09 


 


 





 


Glucose (Fingerstick)


 240 mg/dL


(70-99) 


 


 











Laboratory Tests








Test


 6/4/20


20:26 6/4/20


23:54 6/5/20


05:59 6/5/20


06:45


 


Glucose (Fingerstick)


 131 mg/dL


(70-99) 188 mg/dL


(70-99) 251 mg/dL


(70-99) 





 


White Blood Count


 


 


 


 1.8 x10^3/uL


(4.0-11.0)


 


Red Blood Count


 


 


 


 4.06 x10^6/uL


(3.50-5.40)


 


Hemoglobin


 


 


 


 11.2 g/dL


(12.0-15.5)


 


Hematocrit


 


 


 


 33.9 %


(36.0-47.0)


 


Mean Corpuscular Volume    84 fL () 


 


Mean Corpuscular Hemoglobin    28 pg (25-35) 


 


Mean Corpuscular Hemoglobin


Concent 


 


 


 33 g/dL


(31-37)


 


Red Cell Distribution Width


 


 


 


 15.4 %


(11.5-14.5)


 


Platelet Count


 


 


 


 287 x10^3/uL


(140-400)


 


Sodium Level


 


 


 


 135 mmol/L


(136-145)


 


Potassium Level


 


 


 


 3.1 mmol/L


(3.5-5.1)


 


Chloride Level


 


 


 


 101 mmol/L


()


 


Carbon Dioxide Level


 


 


 


 27 mmol/L


(21-32)


 


Anion Gap    7 (6-14) 


 


Blood Urea Nitrogen    8 mg/dL (7-20) 


 


Creatinine


 


 


 


 0.6 mg/dL


(0.6-1.0)


 


Estimated GFR


(Cockcroft-Gault) 


 


 


 102.7 





 


Glucose Level


 


 


 


 268 mg/dL


(70-99)


 


Calcium Level


 


 


 


 8.1 mg/dL


(8.5-10.1)


 


Phosphorus Level


 


 


 


 3.7 mg/dL


(2.6-4.7)


 


Magnesium Level


 


 


 


 1.7 mg/dL


(1.8-2.4)


 


Ferritin


 


 


 


 972 ng/mL


(8-252)


 


C-Reactive Protein,


Quantitative 


 


 


 44.5 mg/L


(0-3.3)


 


Test


 6/5/20


11:37 6/5/20


14:45 6/5/20


16:00 6/5/20


17:09


 


Glucose (Fingerstick)


 113 mg/dL


(70-99) 


 


 240 mg/dL


(70-99)


 


White Blood Count


 


 1.5 x10^3/uL


(4.0-11.0) 


 





 


Red Blood Count


 


 4.31 x10^6/uL


(3.50-5.40) 


 





 


Hemoglobin


 


 12.3 g/dL


(12.0-15.5) 


 





 


Hematocrit


 


 38.0 %


(36.0-47.0) 


 





 


Mean Corpuscular Volume  88 fL ()   


 


Mean Corpuscular Hemoglobin  29 pg (25-35)   


 


Mean Corpuscular Hemoglobin


Concent 


 32 g/dL


(31-37) 


 





 


Red Cell Distribution Width


 


 16.3 %


(11.5-14.5) 


 





 


Platelet Count


 


 265 x10^3/uL


(140-400) 


 





 


Neutrophils (%) (Auto)  45 % (31-73)   


 


Lymphocytes (%) (Auto)  29 % (24-48)   


 


Monocytes (%) (Auto)  24 % (0-9)   


 


Eosinophils (%) (Auto)  1 % (0-3)   


 


Basophils (%) (Auto)  1 % (0-3)   


 


Neutrophils # (Auto)


 


 0.7 x10^3/uL


(1.8-7.7) 


 





 


Lymphocytes # (Auto)


 


 0.4 x10^3/uL


(1.0-4.8) 


 





 


Monocytes # (Auto)


 


 0.4 x10^3/uL


(0.0-1.1) 


 





 


Eosinophils # (Auto)


 


 0.0 x10^3/uL


(0.0-0.7) 


 





 


Basophils # (Auto)


 


 0.0 x10^3/uL


(0.0-0.2) 


 





 


Sodium Level


 


 


 138 mmol/L


(136-145) 





 


Potassium Level


 


 


 3.7 mmol/L


(3.5-5.1) 





 


Chloride Level


 


 


 100 mmol/L


() 





 


Carbon Dioxide Level


 


 


 27 mmol/L


(21-32) 





 


Anion Gap   11 (6-14)  


 


Blood Urea Nitrogen   7 mg/dL (7-20)  


 


Creatinine


 


 


 0.7 mg/dL


(0.6-1.0) 





 


Estimated GFR


(Cockcroft-Gault) 


 


 85.9 


 





 


BUN/Creatinine Ratio   10 (6-20)  


 


Glucose Level


 


 


 266 mg/dL


(70-99) 





 


Calcium Level


 


 


 8.5 mg/dL


(8.5-10.1) 





 


Total Bilirubin


 


 


 0.4 mg/dL


(0.2-1.0) 





 


Aspartate Amino Transf


(AST/SGOT) 


 


 17 U/L (15-37) 


 





 


Alanine Aminotransferase


(ALT/SGPT) 


 


 23 U/L (14-59) 


 





 


Alkaline Phosphatase


 


 


 104 U/L


() 





 


Total Protein


 


 


 7.7 g/dL


(6.4-8.2) 





 


Albumin


 


 


 3.0 g/dL


(3.4-5.0) 





 


Albumin/Globulin Ratio   0.6 (1.0-1.7)  











Images


Images


6/1/2020 CT ABD PELV W/ IV CONTRST ONLY


 


Indication: Reason: pancreatitis, ongoing upper abd pain / Spl. 


Instructions: IV OMNI 300 75 MLS / History: 


 


Exposure: One or more of the following individualized dose reduction 


techniques were utilized for this examination:  1. Automated exposure 


control  2. Adjustment of the mA and/or kV according to patient size  3. 


Use of iterative reconstruction technique.


 


Comparison: 5/26/2020


 


Small pleural effusions are noted in the lung bases. 


Atelectasis/infiltrate in both lung bases.


 


Tiny hypodense lesion at the inferior right lobe of the liver, stable 


since prior study but too small to characterize, may just represent a 


small cyst, about 5 mm diameter. Spleen is nonenlarged.


 


Pancreatic swelling with ill-defined margins is again seen, slightly 


greater than on the prior study. Pancreatic body measures about 3.5 cm 


with today, compared with 2.7 cm on prior exam. Slight heterogeneity of 


pancreatic enhancement pattern, particularly in the region of the 


pancreatic body. There is also increase in stranding of the peripancreatic


fat and of disorganized peripancreatic fluid. There has been substantial 


increase in fluid in the left paracolic gutter and lateral to the left 


pararenal fascia. This measures 15 Hounsfield units compatible with simple


nature. This extends distally into the upper left pelvis, lateral to the 


left psoas muscle and posterior to the descending colon. There is also 


increase in fatty stranding in this area since the prior exam. There is 


been an increase in swelling and fatty stranding within the subcutaneous 


tissues of the left flank.


 


No evidence of adrenal mass. Kidneys demonstrate symmetric enhancement 


without dominant mass or hydronephrosis. Mild stranding in the perinephric


fat bilaterally, slightly greater than on the prior exam. Tiny 


nonobstructive calculus in the lower pole the left kidney is again seen.


 


Gallbladder is surgically absent. Small hiatal hernia. Wall thickening of 


the gastric antrum through proximal duodenum, may just be reactive and due


to nondistention. No significant small bowel distention. Mild wall 


thickening of the posterior wall of the descending colon, where it is in 


contact with the fluid and edema in the region of the lateral conal 


fascia, probably reactive in nature. The appendix appears within normal 


limits.


 


Tiny fatty density within the distal duodenum is again seen, likely a 


lipoma. Small fatty lesion within the proximal jejunum is also again 


identified, also likely a lipoma. These were seen previously.


 


Mild free pelvic fluid. No evidence of pneumoperitoneum. Urinary bladder 


appears unremarkable.


 


Vertebral body height and alignment are intact. No evidence of aggressive 


bone destruction.


 


The aorta is nonaneurysmal. Renal arteries, superior mesenteric artery and


celiac axis appear patent. Note there is narrowing at the origin of the 


celiac axis but distal flow is identified. Portal vein and splenic vein 


appear grossly patent.


 


IMPRESSION:


1. Progression of findings of acute pancreatitis. Pancreatic enhancement 


pattern is slightly heterogeneous but no definable nonenhancing component 


to suggest liquefactive necrosis at this time. In addition, there is been 


substantial increase in left lateral peripancreatic fluid which extends 


laterally to the left in the region of the lateral conal and renal fascia,


and dissects distally into the left pelvis. This could be infected fluid, 


but does not appear to be an encapsulated collection.


2. Small pleural effusions at both lung bases with bibasilar atelectasis 


and/or infiltrate.


3. Mild wall thickening of the posterior descending colon, most likely 


secondary or reactive given its proximity to the fluid/edema


4. Mild wall thickening of the distal stomach and duodenum, also likely 


reactive and due to nondistention.


5. Narrowing or stenosis at the origin of the celiac axis, but note there 


is evidence of distal enhancement.





05/26/20 CT ABD PELV W/ORAL IV CONTRAST


 


History: Mid abdominal pain


 


Comparison/Correlation: 7/22/2009 CT abdomen and pelvis without contrast


 


Axial images of the abdomen and pelvis were obtained following 


IV and oral contrast. Sagittal and coronal reformatted images were 


provided.


 


Mild bibasilar costophrenic sulcus atelectasis is present. Bilateral 


carotid cholecystectomy noted. Liver and spleen are unremarkable. At the 


inferior tip of the right hepatic lobe, there is a very small to 


characterize low-attenuation lesion which is present on axial image 49. 


This may represent a cyst or other benign process.


 


Stranding about the pancreas with surrounding edema is noted. Edema 


extends along the left lateral conal fascia.


 


Low-attenuation involving the distal duodenum on axial image 49 is present


measuring 0.6 cm diameter. Within the right proximal jejunum on coronal 


image 23, there is a linear fat attenuation structure measuring 2.1 cm x 


0.4 cm. These have the appearance of benign lipomas. There is no bowel 


obstruction. No extraluminal gas. Moderate quantity of stool in the colon 


is present. Appendix is normal. No inflammatory change about the cecum. 


Urinary bladder is unremarkable.


 


Lower lumbar spine facet joint degenerative remodeling is present. 


Transitional L5 vertebra is present.


Hysterectomy noted.


 


Impression:


Findings of acute pancreatitis. No loculated collections. No biliary 


dilatation.


 


Distal duodenal and proximal jejunal intraluminal lipomas.





Assessment/Plan


Assessment/Plan





59 year-old female admitted with acute pancreatitis in the setting of markedly 

elevated triglycerides (> 3,000) and adult onset Still's disease on 

Methotrexate.





WBC 6.3 on 5/26/2020, 4.5 on 5/27/2020, 1.8 5/28-6/5/2020.





ANC 0.3 on 5/28/2020, 0.5 on 6/4/2020. 





No significant cytopenias in other cell lines. 





Although neutropenia could be related to recent Methotrexate therapy, the 

patient has been on Methotrexate for approximately 1 year and came in with 

normal WBC. Appropriately, Methotrexate has been discontinued, the patient was 

started on Leucovorin rescue. 





Occasionally, severe pancreatitis could be associated with neutropenia, due to 

sequestration of neutrophils in and around pancreas.However, patient has stable 

vital signs, no significant third spacing, and no other indicators of extremely 

severe pancreatitis.   





Severe neutropenia could also be associated with Still's disease if it's 

associated with macrophage activation syndrome (MAS).





Of interest, markedly elevated triglycerides levels are frequently present if 

Still's disease is associated with MAS.





Plan


Plan of Care


Suggest 


1. Rheumatology consult. 


2. High doses of steroids, such as solumedrol 1 gram IV x 2-3 days. 


3. Check ferritin level today. 


4. Other interventions will likely be needed depending on rheumatology i

nput/ferritin levels.


5. Will follow-up with patient at [facility] 

(appointment will be scheduled after review of all outside records/labs). 





Thank you for the opportunity to see your patient. Please call with any 

questions or concerns. 





Doroteo Dumont MD


[phone]











SERGE DUMONT MD             Jun 5, 2020 19:16

## 2020-06-05 NOTE — NUR
Pt states that Roxicodone 5mg seems to be helping keeping pain down to level 3 on 0-10 pain 
scale. Pt on TPN will take Blood Sugar Q6.

## 2020-06-05 NOTE — NUR
[facility] following. Discussed with RN, pt on TPN - diet advanced to clears. [facility] will continue to 
follow

-------------------------------------------------------------------------------

Addendum: 06/05/20 at 1528 by JOSEFINA MIKE

-------------------------------------------------------------------------------

Dr. Pitts wanting  transfer for Rheumatology consult. [facility] faxed clinicals and clouded 
images to  (ph: [phone], fax: [phone]). Spoke Marc at , they have received 
referral and will contact Dr. Pitts to determine if pt is accepted. RN notified.

-------------------------------------------------------------------------------

Addendum: 06/05/20 at 1643 by JOSEFINA MIKE

-------------------------------------------------------------------------------

Marc contacted [facility] to advise pt's insurance has only authorized for pt to be at [facility] 
and that [facility] needs to get a referral auth from [facility] for a transfer to . Diane 
at [facility] [phone], information given to Celeste BRADFORD. [facility] will continue to follow.

## 2020-06-05 NOTE — NUR
Contacted [facility] re: possible tx today for rheumatology consult. Stated it was still pending at 
this time. Jennifer,RN nursing supervisor notified of pending status and new order to tx to [facility] 
if pt does not tx to [facility].

## 2020-06-05 NOTE — NUR
Attempt made to call Diane at [facility] to verify what needs to be done for 
transfer to . After approximately 10 minutes of continuous automated prompts and 
holds/transfers unable to locate Diane. SUZETTE Rodriguez nursing supervisor notified.

## 2020-06-05 NOTE — PDOC
Subjective:


Subjective:


"They said I could have liquids so I'm having chicken broth."


Abd pain is better.  Loose stool yesterday or the day before.





Objective:


Objective:


Reviewed surgery note, note plans for hematology to see.


Vital Signs:





                                   Vital Signs








  Date Time  Temp Pulse Resp B/P (MAP) Pulse Ox O2 Delivery O2 Flow Rate FiO2


 


6/5/20 09:05      Room Air  


 


6/5/20 07:00 98.2 96 18 120/76 (91) 96   





 98.2       


 


6/4/20 20:00       2.0 








Labs:





Laboratory Tests








Test


 6/4/20


11:59 6/4/20


16:51 6/4/20


20:26 6/4/20


23:54


 


Glucose (Fingerstick) 127 mg/dL  140 mg/dL  131 mg/dL  188 mg/dL 


 


Test


 6/5/20


05:59 6/5/20


06:45 


 





 


Glucose (Fingerstick) 251 mg/dL    


 


Sodium Level  135 mmol/L   


 


Potassium Level  3.1 mmol/L   


 


Chloride Level  101 mmol/L   


 


Carbon Dioxide Level  27 mmol/L   


 


Anion Gap  7   


 


Blood Urea Nitrogen  8 mg/dL   


 


Creatinine  0.6 mg/dL   


 


Estimated GFR


(Cockcroft-Gault) 


 102.7 


 


 





 


Glucose Level  268 mg/dL   


 


Calcium Level  8.1 mg/dL   


 


Phosphorus Level  3.7 mg/dL   


 


Magnesium Level  1.7 mg/dL   











PE:





GEN: NAD - heating up chicken broth at microwave in the hallway


NEURO/PSYCH: A & O 3





A/P:


Pancreatitis, hypertriglyceridemia - interval CT 6/1 worse, s/p PICC placement 

and on TPN


Still's disease w/ h/o methotrexate and prednisone use, leukopenia





--


Taking clears again, will review this w/ Dr. Parra.





Justicifation of Admission Dx:


Justifications for Admission:


Justification of Admission Dx:  Yes











AILYN OWEN          Jun 5, 2020 09:46

## 2020-06-06 VITALS — SYSTOLIC BLOOD PRESSURE: 153 MMHG | DIASTOLIC BLOOD PRESSURE: 87 MMHG

## 2020-06-06 VITALS — DIASTOLIC BLOOD PRESSURE: 88 MMHG | SYSTOLIC BLOOD PRESSURE: 144 MMHG

## 2020-06-06 VITALS — DIASTOLIC BLOOD PRESSURE: 78 MMHG | SYSTOLIC BLOOD PRESSURE: 126 MMHG

## 2020-06-06 VITALS — SYSTOLIC BLOOD PRESSURE: 145 MMHG | DIASTOLIC BLOOD PRESSURE: 75 MMHG

## 2020-06-06 VITALS — DIASTOLIC BLOOD PRESSURE: 74 MMHG | SYSTOLIC BLOOD PRESSURE: 132 MMHG

## 2020-06-06 VITALS — SYSTOLIC BLOOD PRESSURE: 161 MMHG | DIASTOLIC BLOOD PRESSURE: 89 MMHG

## 2020-06-06 LAB
ANION GAP SERPL CALC-SCNC: 7 MMOL/L (ref 6–14)
BASOPHILS # BLD AUTO: 0 X10^3/UL (ref 0–0.2)
BASOPHILS NFR BLD: 1 % (ref 0–3)
BUN SERPL-MCNC: 7 MG/DL (ref 7–20)
CALCIUM SERPL-MCNC: 8.2 MG/DL (ref 8.5–10.1)
CHLORIDE SERPL-SCNC: 102 MMOL/L (ref 98–107)
CO2 SERPL-SCNC: 28 MMOL/L (ref 21–32)
CREAT SERPL-MCNC: 0.6 MG/DL (ref 0.6–1)
EOSINOPHIL NFR BLD: 0.1 X10^3/UL (ref 0–0.7)
EOSINOPHIL NFR BLD: 3 % (ref 0–3)
ERYTHROCYTE [DISTWIDTH] IN BLOOD BY AUTOMATED COUNT: 15.4 % (ref 11.5–14.5)
GFR SERPLBLD BASED ON 1.73 SQ M-ARVRAT: 102.7 ML/MIN
GLUCOSE SERPL-MCNC: 259 MG/DL (ref 70–99)
HCT VFR BLD CALC: 31.8 % (ref 36–47)
HGB BLD-MCNC: 10.5 G/DL (ref 12–15.5)
LYMPHOCYTES # BLD: 0.7 X10^3/UL (ref 1–4.8)
LYMPHOCYTES NFR BLD AUTO: 37 % (ref 24–48)
MAGNESIUM SERPL-MCNC: 2 MG/DL (ref 1.8–2.4)
MCH RBC QN AUTO: 28 PG (ref 25–35)
MCHC RBC AUTO-ENTMCNC: 33 G/DL (ref 31–37)
MCV RBC AUTO: 84 FL (ref 79–100)
MONO #: 0.4 X10^3/UL (ref 0–1.1)
MONOCYTES NFR BLD: 22 % (ref 0–9)
NEUT #: 0.7 X10^3/UL (ref 1.8–7.7)
NEUTROPHILS NFR BLD AUTO: 37 % (ref 31–73)
PHOSPHATE SERPL-MCNC: 3.6 MG/DL (ref 2.6–4.7)
PLATELET # BLD AUTO: 273 X10^3/UL (ref 140–400)
POTASSIUM SERPL-SCNC: 3.5 MMOL/L (ref 3.5–5.1)
RBC # BLD AUTO: 3.8 X10^6/UL (ref 3.5–5.4)
SODIUM SERPL-SCNC: 137 MMOL/L (ref 136–145)
WBC # BLD AUTO: 2 X10^3/UL (ref 4–11)

## 2020-06-06 RX ADMIN — LEUCOVORIN CALCIUM SCH MG: 5 TABLET ORAL at 22:12

## 2020-06-06 RX ADMIN — LEUCOVORIN CALCIUM SCH MG: 5 TABLET ORAL at 12:50

## 2020-06-06 RX ADMIN — PSYLLIUM HUSK SCH PKT: 3.4 POWDER ORAL at 10:29

## 2020-06-06 RX ADMIN — POTASSIUM CHLORIDE SCH MEQ: 1500 TABLET, EXTENDED RELEASE ORAL at 10:29

## 2020-06-06 RX ADMIN — POLYETHYLENE GLYCOL 3350 SCH GM: 17 POWDER, FOR SOLUTION ORAL at 10:29

## 2020-06-06 RX ADMIN — FAMOTIDINE SCH MG: 10 INJECTION, SOLUTION INTRAVENOUS at 22:12

## 2020-06-06 RX ADMIN — SODIUM CHLORIDE, SODIUM LACTATE, POTASSIUM CHLORIDE, AND CALCIUM CHLORIDE SCH MLS/HR: .6; .31; .03; .02 INJECTION, SOLUTION INTRAVENOUS at 06:50

## 2020-06-06 RX ADMIN — INSULIN LISPRO SCH UNITS: 100 INJECTION, SOLUTION INTRAVENOUS; SUBCUTANEOUS at 12:54

## 2020-06-06 RX ADMIN — METHYLPREDNISOLONE SODIUM SUCCINATE SCH MLS/HR: 500 INJECTION, POWDER, FOR SOLUTION INTRAMUSCULAR; INTRAVENOUS at 10:30

## 2020-06-06 RX ADMIN — INSULIN LISPRO SCH UNITS: 100 INJECTION, SOLUTION INTRAVENOUS; SUBCUTANEOUS at 10:43

## 2020-06-06 RX ADMIN — SODIUM CHLORIDE, SODIUM LACTATE, POTASSIUM CHLORIDE, AND CALCIUM CHLORIDE SCH MLS/HR: .6; .31; .03; .02 INJECTION, SOLUTION INTRAVENOUS at 20:10

## 2020-06-06 RX ADMIN — Medication PRN EACH: at 12:58

## 2020-06-06 RX ADMIN — LEUCOVORIN CALCIUM SCH MG: 5 TABLET ORAL at 05:43

## 2020-06-06 RX ADMIN — INSULIN LISPRO SCH UNITS: 100 INJECTION, SOLUTION INTRAVENOUS; SUBCUTANEOUS at 17:21

## 2020-06-06 RX ADMIN — ENOXAPARIN SODIUM SCH MG: 40 INJECTION SUBCUTANEOUS at 15:00

## 2020-06-06 NOTE — PDOC
Infectious Disease Note


Subjective


Subjective


Comfortable


Will pain/N/V/F/C/S


TPN





ROS


ROS


as mentioned above





Vital Sign


Vital Signs





Vital Signs








  Date Time  Temp Pulse Resp B/P (MAP) Pulse Ox O2 Delivery O2 Flow Rate FiO2


 


6/6/20 11:26 97.4 80 18 145/75 (98) 94 Room Air  





 97.4       











Physical Exam


PHYSICAL EXAM


GENERAL:  Propped up in bed, alert, relaxed 


HEENT:  Oral cavity pink, no lesions seen 


NECK:  Supple. 


LUNGS:  Clear, nonlabored 


HEART:  S1, S2 regular.


ABDOMEN:  Soft, nontender.  


EXTREMITIES:  No edema, cyanosis.


SKIN:  warm to touch. No signs of rash 


NEUROLOGIC: Alert, awake and appropriate.  No focal neurologic deficit.


RUE-PICC (6/4) clean





Labs


Lab





Laboratory Tests








Test


 6/5/20


14:45 6/5/20


15:10 6/5/20


16:00 6/5/20


17:09


 


White Blood Count


 1.5 x10^3/uL


(4.0-11.0) 


 


 





 


Red Blood Count


 4.31 x10^6/uL


(3.50-5.40) 


 


 





 


Hemoglobin


 12.3 g/dL


(12.0-15.5) 


 


 





 


Hematocrit


 38.0 %


(36.0-47.0) 


 


 





 


Mean Corpuscular Volume 88 fL ()    


 


Mean Corpuscular Hemoglobin 29 pg (25-35)    


 


Mean Corpuscular Hemoglobin


Concent 32 g/dL


(31-37) 


 


 





 


Red Cell Distribution Width


 16.3 %


(11.5-14.5) 


 


 





 


Platelet Count


 265 x10^3/uL


(140-400) 


 


 





 


Neutrophils (%) (Auto) 45 % (31-73)    


 


Lymphocytes (%) (Auto) 29 % (24-48)    


 


Monocytes (%) (Auto) 24 % (0-9)    


 


Eosinophils (%) (Auto) 1 % (0-3)    


 


Basophils (%) (Auto) 1 % (0-3)    


 


Neutrophils # (Auto)


 0.7 x10^3/uL


(1.8-7.7) 


 


 





 


Lymphocytes # (Auto)


 0.4 x10^3/uL


(1.0-4.8) 


 


 





 


Monocytes # (Auto)


 0.4 x10^3/uL


(0.0-1.1) 


 


 





 


Eosinophils # (Auto)


 0.0 x10^3/uL


(0.0-0.7) 


 


 





 


Basophils # (Auto)


 0.0 x10^3/uL


(0.0-0.2) 


 


 





 


Coronavirus (COVID-19)(PCR)


 


 Not detected


(NOT DETECT.) 


 





 


Sodium Level


 


 


 138 mmol/L


(136-145) 





 


Potassium Level


 


 


 3.7 mmol/L


(3.5-5.1) 





 


Chloride Level


 


 


 100 mmol/L


() 





 


Carbon Dioxide Level


 


 


 27 mmol/L


(21-32) 





 


Anion Gap   11 (6-14)  


 


Blood Urea Nitrogen   7 mg/dL (7-20)  


 


Creatinine


 


 


 0.7 mg/dL


(0.6-1.0) 





 


Estimated GFR


(Cockcroft-Gault) 


 


 85.9 


 





 


BUN/Creatinine Ratio   10 (6-20)  


 


Glucose Level


 


 


 266 mg/dL


(70-99) 





 


Calcium Level


 


 


 8.5 mg/dL


(8.5-10.1) 





 


Total Bilirubin


 


 


 0.4 mg/dL


(0.2-1.0) 





 


Aspartate Amino Transf


(AST/SGOT) 


 


 17 U/L (15-37) 


 





 


Alanine Aminotransferase


(ALT/SGPT) 


 


 23 U/L (14-59) 


 





 


Alkaline Phosphatase


 


 


 104 U/L


() 





 


Total Protein


 


 


 7.7 g/dL


(6.4-8.2) 





 


Albumin


 


 


 3.0 g/dL


(3.4-5.0) 





 


Albumin/Globulin Ratio   0.6 (1.0-1.7)  


 


Glucose (Fingerstick)


 


 


 


 240 mg/dL


(70-99)


 


Test


 6/6/20


06:40 6/6/20


07:29 6/6/20


11:05 





 


White Blood Count


 2.0 x10^3/uL


(4.0-11.0) 


 


 





 


Red Blood Count


 3.80 x10^6/uL


(3.50-5.40) 


 


 





 


Hemoglobin


 10.5 g/dL


(12.0-15.5) 


 


 





 


Hematocrit


 31.8 %


(36.0-47.0) 


 


 





 


Mean Corpuscular Volume 84 fL ()    


 


Mean Corpuscular Hemoglobin 28 pg (25-35)    


 


Mean Corpuscular Hemoglobin


Concent 33 g/dL


(31-37) 


 


 





 


Red Cell Distribution Width


 15.4 %


(11.5-14.5) 


 


 





 


Platelet Count


 273 x10^3/uL


(140-400) 


 


 





 


Neutrophils (%) (Auto) 37 % (31-73)    


 


Lymphocytes (%) (Auto) 37 % (24-48)    


 


Monocytes (%) (Auto) 22 % (0-9)    


 


Eosinophils (%) (Auto) 3 % (0-3)    


 


Basophils (%) (Auto) 1 % (0-3)    


 


Neutrophils # (Auto)


 0.7 x10^3/uL


(1.8-7.7) 


 


 





 


Lymphocytes # (Auto)


 0.7 x10^3/uL


(1.0-4.8) 


 


 





 


Monocytes # (Auto)


 0.4 x10^3/uL


(0.0-1.1) 


 


 





 


Eosinophils # (Auto)


 0.1 x10^3/uL


(0.0-0.7) 


 


 





 


Basophils # (Auto)


 0.0 x10^3/uL


(0.0-0.2) 


 


 





 


Sodium Level


 137 mmol/L


(136-145) 


 


 





 


Potassium Level


 3.5 mmol/L


(3.5-5.1) 


 


 





 


Chloride Level


 102 mmol/L


() 


 


 





 


Carbon Dioxide Level


 28 mmol/L


(21-32) 


 


 





 


Anion Gap 7 (6-14)    


 


Blood Urea Nitrogen 7 mg/dL (7-20)    


 


Creatinine


 0.6 mg/dL


(0.6-1.0) 


 


 





 


Estimated GFR


(Cockcroft-Gault) 102.7 


 


 


 





 


Glucose Level


 259 mg/dL


(70-99) 


 


 





 


Calcium Level


 8.2 mg/dL


(8.5-10.1) 


 


 





 


Phosphorus Level


 3.6 mg/dL


(2.6-4.7) 


 


 





 


Magnesium Level


 2.0 mg/dL


(1.8-2.4) 


 


 





 


Glucose (Fingerstick)


 


 244 mg/dL


(70-99) 256 mg/dL


(70-99) 














Objective


Assessment


1.  Acute pancreatitis, possibly from methotrexate or very high triglycerides, 

Methotrexate has been stopped. 


2.  Neutropenia secondary to methotrexate toxicity. started on Leucovorin.  


3.  Adult-onset Still's disease.


4.  Hypertriglyceridemia





Plan


Plan of Care


Continue supportive care


Heme/onc eval noted 


On high-dose steriods 


NPO


Neutropenic precautions





D/w nursing 


Patient seen. Chart reviewed in detail. Case discussed with NP. I agree with 

above plan.cor-formulated with NP











JOHNNY WIGGINS         Jun 6, 2020 12:20


JUS MAKI MD              Jun 6, 2020 22:54

## 2020-06-06 NOTE — PDOC
Progress Note


Current Problem List


Problems:  


(1) Leukopenia due to antineoplastic chemotherapy


(2) Pancreatitis, acute





Subjective


Subjective


Comfortable


Will pain/N/V/F/C/S


TPN





ROS


ROS


No nausea


No vomiting


No pain


No rash





Vital Sign


Vital Signs





Vital Signs








  Date Time  Temp Pulse Resp B/P (MAP) Pulse Ox O2 Delivery O2 Flow Rate FiO2


 


20 15:11 97.8 77 16 153/87 (109) 94 Room Air  





 97.8       











Physical Exam


PHYSICAL EXAM


GENERAL:  Propped up in bed, alert, relaxed 


HEENT:  Oral cavity pink, no lesions seen 


NECK:  Supple. 


LUNGS:  Clear, nonlabored 


HEART:  S1, S2 regular.


ABDOMEN:  Soft, nontender.  


EXTREMITIES:  No edema, cyanosis.


SKIN:  warm to touch. No signs of rash 


NEUROLOGIC: Alert, awake and appropriate.  No focal neurologic deficit.


RUE-PICC () clean





Labs


Lab





Laboratory Tests








Test


 20


17:09 20


06:40 20


07:29 20


11:05


 


Glucose (Fingerstick)


 240 mg/dL


(70-99) 


 244 mg/dL


(70-99) 256 mg/dL


(70-99)


 


White Blood Count


 


 2.0 x10^3/uL


(4.0-11.0) 


 





 


Red Blood Count


 


 3.80 x10^6/uL


(3.50-5.40) 


 





 


Hemoglobin


 


 10.5 g/dL


(12.0-15.5) 


 





 


Hematocrit


 


 31.8 %


(36.0-47.0) 


 





 


Mean Corpuscular Volume  84 fL ()   


 


Mean Corpuscular Hemoglobin  28 pg (25-35)   


 


Mean Corpuscular Hemoglobin


Concent 


 33 g/dL


(31-37) 


 





 


Red Cell Distribution Width


 


 15.4 %


(11.5-14.5) 


 





 


Platelet Count


 


 273 x10^3/uL


(140-400) 


 





 


Neutrophils (%) (Auto)  37 % (31-73)   


 


Lymphocytes (%) (Auto)  37 % (24-48)   


 


Monocytes (%) (Auto)  22 % (0-9)   


 


Eosinophils (%) (Auto)  3 % (0-3)   


 


Basophils (%) (Auto)  1 % (0-3)   


 


Neutrophils # (Auto)


 


 0.7 x10^3/uL


(1.8-7.7) 


 





 


Lymphocytes # (Auto)


 


 0.7 x10^3/uL


(1.0-4.8) 


 





 


Monocytes # (Auto)


 


 0.4 x10^3/uL


(0.0-1.1) 


 





 


Eosinophils # (Auto)


 


 0.1 x10^3/uL


(0.0-0.7) 


 





 


Basophils # (Auto)


 


 0.0 x10^3/uL


(0.0-0.2) 


 





 


Sodium Level


 


 137 mmol/L


(136-145) 


 





 


Potassium Level


 


 3.5 mmol/L


(3.5-5.1) 


 





 


Chloride Level


 


 102 mmol/L


() 


 





 


Carbon Dioxide Level


 


 28 mmol/L


(21-32) 


 





 


Anion Gap  7 (6-14)   


 


Blood Urea Nitrogen  7 mg/dL (7-20)   


 


Creatinine


 


 0.6 mg/dL


(0.6-1.0) 


 





 


Estimated GFR


(Cockcroft-Gault) 


 102.7 


 


 





 


Glucose Level


 


 259 mg/dL


(70-99) 


 





 


Calcium Level


 


 8.2 mg/dL


(8.5-10.1) 


 





 


Phosphorus Level


 


 3.6 mg/dL


(2.6-4.7) 


 





 


Magnesium Level


 


 2.0 mg/dL


(1.8-2.4) 


 





 


Test


 20


16:17 


 


 





 


Glucose (Fingerstick)


 215 mg/dL


(70-99) 


 


 














Objective


Assessment


Hem/onc note.





A: 60 yo F with leukopenia, neutropenia/lymphopenia.





Decreased WBC in the setting of methotrexate therapy, acute pancreatitis.





Possible intercurrent viral infection might be contributing both to leukopenia 

and pancreatitis.





Update: Ferritin came back<1000, IgG4 71. 





No evidence of active Still's. 





No evidence of macrophage activation syndrome.





P: 


Suggest


1. Decrease steroids down to maintenance 3-5 mg starting tomorrow.


2. G-CSF (Zarxio, Neupogen) 300 mcg sc daily to expedite hematologic recovery.


3. Bone marrow biopsy will be considered if leukopenia doesn't start to improve 

over the next 3-4 days.


3. Follow-up as outpatient in 2-3 weeks.





Thank you for the opportunity to see your patient, please call with any 

questions.


Cell #[phone]





Plan


Plan of Care


Suggest:





1. Decrease steroids down to maintenance 3-5 mg starting tomorrow.


2. G-CSF (Zarxio, Neupogen) 300 mcg sc daily to expedite hematologic recovery.


3. Bone marrow biopsy will be considered if leukopenia doesn't start to improve 

over the next 3-4 days.


3. Follow-up as outpatient in 2-3 weeks.





Thank you for the opportunity to see your patient, please call with any 

questions.





Cell #[phone]





Justicifation of Admission Dx:


Justifications for Admission:


Justification of Admission Dx:  Yes





Nutrition Consultation


Dietary Evaluation:


Recommendations by RD:  Dietary education by RD, Increase Calorie Intake, 

PPN/TPN


Comments:  


REC TPN per followin g dextrose, 65 g AA, 20 g lipids





REC advance diet as tolerated, goal diet gluten free/ADA (A1c 13.6)  


w/protein supplements per pt choice


Expected Outcomes/Goals:  


TPN to meet 65% - 7% % est needs while NPO





diet advancement





Malnutrition Findings:


Food and Nutrition Intake (Mod:  <75% est energy req 7days


Weight Status:  Appropriate











SERGE PICHARDO MD             2020 17:11

## 2020-06-06 NOTE — PDOC
PROGRESS NOTES


Chief Complaint


Chief Complaint


impression 








Acute pancreatitis - likely 2/2 hyper-triglyceridemia.  IgG4 RD less likely 

given level of 72 mg/DL and chronic immunosuppression on methotrexate and p

rednisone.  I discussed with [facility] rheumatology concern that MTX could play a 

role as well.  Hold dosing for leukopenia


 6/2 ct Progression of findings of acute pancreatitis. Pancreatic enhancement  

pattern is slightly heterogeneous but no definable nonenhancing component to 

suggest liquefactive necrosis at this time. In addition, there is been 

substantial increase in left lateral peripancreatic fluid which extends 


laterally to the left in the region of the lateral conal and renal fascia,and 

dissects distally into the left pelvis. This could be infected fluid, but does 

not appear to be an encapsulated collection.


Duodenal and jejunal intraluminal lipomas


Hypertriglyceridemia - will need outpatient Endocrinology referral for 

cholesterol metabolism treatment, I have suggested prescription 2g TID fish oil 

(Vascepa or Lovaza) in the meantime once pancreatitis resolves


Hyponatremia


DM2 with Hyperglycemia - A1c 13.6. Should continue on insulin therapy until A1c 

< 8, then consider PO options. Would probably have to avoid GLP1 therapy given 

her pancreatitis.  Would defer to an endocrinologist for this.


Celiac disease 


Adult onset stills disease


Still's disease w/ h/o methotrexate and prednisone use, leukopenia


Anemia


Asthma


NATO on CPAP


GERD 


Leukopenia -likely related to chronic immunosuppression will hold MTX therapy 

for now and change to a prednisone taper on discharge.











FEN - LR for maintenance, NPO, will wait for recommendations from our consultant

regarding diet


PPX - lovenox


FULL CODE


DIspo - inpatient for pancreatitis recovery


TPN until pain/nausea resolve with worsening pancreatitis. 


heme consult





History of Present Illness


History of Present Illness


Ms Almanza is a 60yo F [employer]  w/ PMHx celiac disease, adult onset stills 

disease, Anemia, Asthma, NATO on CPAP, GERD who presents with severe upper 

abdominal pain, nausea, vomiting, fatigue. She states this started suddenly this

morning while she was working from home on teleconference (works as a 

on the  base). She only had oatmeal for breakfast.


She has never had anything similar to this in the past.  She generally feels 

unwell.  She is not had any fever, diarrhea, chest pain, shortness of breath, 

dysuria, hematuria, blood in her stools.  She denies alcohol abuse.  Pain does 

not move anywhere.  Nothing seems to make it better or worse.  She has not tried

anything at home.  He does not drink alcohol, and is status post 

cholecystectomy.  No calcium disorders.


She takes 20mg methotrexate weekly on  and is on 3 mg of chronic 

prednisone for her stills disease.  She was diagnosed with celiac disease 

2020 and has just recently started on a gluten-free diet.  No recent 

sick contacts that she can recall.


Labs significant for lipase 72491, glucose 477, , bilirubin 1.1, Calcium 

8.3, K 3.7, WBC 6.3, Hb 15, Platelets 247


CT abdomen pelvis shows surgically absent gallbladder and surgically absent 

uterus and describes duodenal and jejunal intraluminal lipomas. Findings of 

acute pancreatitis. No loculated collections. No biliary dilatation.


Admitted for further treatment.





: Lipase down. A1c 13.6, Triglycerides 3252. Started on insulin gtt.


: Abdominal pain and glucose improved.  Having ice chips per GI.  Afebrile. 

No CP or SOB. D/w [facility] rheumatology, to hold MTX therapy on  given her 

leukopenia and pancreatitis.


: Overnight afebrile.  Labs improved. Still with left-sided abdominal pain. 

WBC 1.8.  She notes she had not previously had a formal diagnosis of diabetes.  

No CP or SOB


: Pain improved, advancing diet per GI lipase 328.  WBC 2, K2.6, MG 1.4


: Afebrile overnight.  Still with abdominal pain, no CP or SOB


: Patient continues to have discomfort especially when she has a diet no 

fever or chills were reported nevertheless she says that she woke up drenched in

sweat in the middle of night, no other complaints were reported overnight, plan 

of care discussed in detail


: Patient will continue to have bowel rest in light of her CT from yesterday.

No new complaints. 


: Recommendations from Oncology greatly appreciated, continues to have pain, 

no other new complaints, she is tolerating some clear liquids continues to be on

TPN





Vitals


Vitals





Vital Signs








  Date Time  Temp Pulse Resp B/P (MAP) Pulse Ox O2 Delivery O2 Flow Rate FiO2


 


20 11:26 97.4 80 18 145/75 (98) 94 Room Air  





 97.4       











Physical Exam


Physical Exam


GENERAL:  Propped up in bed, alert, relaxed 


HEENT:  Oral cavity pink, no lesions seen 


NECK:  Supple. 


LUNGS:  Clear, nonlabored 


HEART:  S1, S2 regular.


ABDOMEN:  Soft, nontender.  


EXTREMITIES:  No edema, cyanosis.


SKIN:  warm to touch. No signs of rash 


NEUROLOGIC: Alert, awake and appropriate.  No focal neurologic deficit.


RUE-PICC () clean


General:  Alert, Oriented X3, Cooperative


Heart:  Regular rate


Lungs:  Clear


Abdomen:  Soft (minimally tender with palpation)


Extremities:  No clubbing, No cyanosis


Skin:  No rashes





Labs


LABS





Laboratory Tests








Test


 20


15:10 20


16:00 20


17:09 20


06:40


 


Coronavirus (COVID-19)(PCR)


 Not detected


(NOT DETECT.) 


 


 





 


Sodium Level


 


 138 mmol/L


(136-145) 


 137 mmol/L


(136-145)


 


Potassium Level


 


 3.7 mmol/L


(3.5-5.1) 


 3.5 mmol/L


(3.5-5.1)


 


Chloride Level


 


 100 mmol/L


() 


 102 mmol/L


()


 


Carbon Dioxide Level


 


 27 mmol/L


(21-32) 


 28 mmol/L


(21-32)


 


Anion Gap  11 (6-14)   7 (6-14) 


 


Blood Urea Nitrogen  7 mg/dL (7-20)   7 mg/dL (7-20) 


 


Creatinine


 


 0.7 mg/dL


(0.6-1.0) 


 0.6 mg/dL


(0.6-1.0)


 


Estimated GFR


(Cockcroft-Gault) 


 85.9 


 


 102.7 





 


BUN/Creatinine Ratio  10 (6-20)   


 


Glucose Level


 


 266 mg/dL


(70-99) 


 259 mg/dL


(70-99)


 


Calcium Level


 


 8.5 mg/dL


(8.5-10.1) 


 8.2 mg/dL


(8.5-10.1)


 


Total Bilirubin


 


 0.4 mg/dL


(0.2-1.0) 


 





 


Aspartate Amino Transf


(AST/SGOT) 


 17 U/L (15-37) 


 


 





 


Alanine Aminotransferase


(ALT/SGPT) 


 23 U/L (14-59) 


 


 





 


Alkaline Phosphatase


 


 104 U/L


() 


 





 


Total Protein


 


 7.7 g/dL


(6.4-8.2) 


 





 


Albumin


 


 3.0 g/dL


(3.4-5.0) 


 





 


Albumin/Globulin Ratio  0.6 (1.0-1.7)   


 


Glucose (Fingerstick)


 


 


 240 mg/dL


(70-99) 





 


White Blood Count


 


 


 


 2.0 x10^3/uL


(4.0-11.0)


 


Red Blood Count


 


 


 


 3.80 x10^6/uL


(3.50-5.40)


 


Hemoglobin


 


 


 


 10.5 g/dL


(12.0-15.5)


 


Hematocrit


 


 


 


 31.8 %


(36.0-47.0)


 


Mean Corpuscular Volume    84 fL () 


 


Mean Corpuscular Hemoglobin    28 pg (25-35) 


 


Mean Corpuscular Hemoglobin


Concent 


 


 


 33 g/dL


(31-37)


 


Red Cell Distribution Width


 


 


 


 15.4 %


(11.5-14.5)


 


Platelet Count


 


 


 


 273 x10^3/uL


(140-400)


 


Neutrophils (%) (Auto)    37 % (31-73) 


 


Lymphocytes (%) (Auto)    37 % (24-48) 


 


Monocytes (%) (Auto)    22 % (0-9) 


 


Eosinophils (%) (Auto)    3 % (0-3) 


 


Basophils (%) (Auto)    1 % (0-3) 


 


Neutrophils # (Auto)


 


 


 


 0.7 x10^3/uL


(1.8-7.7)


 


Lymphocytes # (Auto)


 


 


 


 0.7 x10^3/uL


(1.0-4.8)


 


Monocytes # (Auto)


 


 


 


 0.4 x10^3/uL


(0.0-1.1)


 


Eosinophils # (Auto)


 


 


 


 0.1 x10^3/uL


(0.0-0.7)


 


Basophils # (Auto)


 


 


 


 0.0 x10^3/uL


(0.0-0.2)


 


Phosphorus Level


 


 


 


 3.6 mg/dL


(2.6-4.7)


 


Magnesium Level


 


 


 


 2.0 mg/dL


(1.8-2.4)


 


Test


 20


07:29 20


11:05 


 





 


Glucose (Fingerstick)


 244 mg/dL


(70-99) 256 mg/dL


(70-99) 


 














Assessment and Plan


Assessmemt and Plan


Problems


Medical Problems:


(1) Dehydration


Status: Acute  





(2) Hyperglycemia


Status: Acute  





(3) Pancreatitis, acute


Status: Acute  











Comment


Review of Relevant


I have reviewed the following items elizabeth (where applicable) has been applied.


Labs





Laboratory Tests








Test


 20


16:51 20


20:26 20


23:54 20


05:59


 


Glucose (Fingerstick)


 140 mg/dL


(70-99) 131 mg/dL


(70-99) 188 mg/dL


(70-99) 251 mg/dL


(70-99)


 


Test


 20


06:45 20


11:37 20


14:45 20


15:10


 


White Blood Count


 1.8 x10^3/uL


(4.0-11.0) 


 1.5 x10^3/uL


(4.0-11.0) 





 


Red Blood Count


 4.06 x10^6/uL


(3.50-5.40) 


 4.31 x10^6/uL


(3.50-5.40) 





 


Hemoglobin


 11.2 g/dL


(12.0-15.5) 


 12.3 g/dL


(12.0-15.5) 





 


Hematocrit


 33.9 %


(36.0-47.0) 


 38.0 %


(36.0-47.0) 





 


Mean Corpuscular Volume 84 fL ()   88 fL ()  


 


Mean Corpuscular Hemoglobin 28 pg (25-35)   29 pg (25-35)  


 


Mean Corpuscular Hemoglobin


Concent 33 g/dL


(31-37) 


 32 g/dL


(31-37) 





 


Red Cell Distribution Width


 15.4 %


(11.5-14.5) 


 16.3 %


(11.5-14.5) 





 


Platelet Count


 287 x10^3/uL


(140-400) 


 265 x10^3/uL


(140-400) 





 


Sodium Level


 135 mmol/L


(136-145) 


 


 





 


Potassium Level


 3.1 mmol/L


(3.5-5.1) 


 


 





 


Chloride Level


 101 mmol/L


() 


 


 





 


Carbon Dioxide Level


 27 mmol/L


(21-32) 


 


 





 


Anion Gap 7 (6-14)    


 


Blood Urea Nitrogen 8 mg/dL (7-20)    


 


Creatinine


 0.6 mg/dL


(0.6-1.0) 


 


 





 


Estimated GFR


(Cockcroft-Gault) 102.7 


 


 


 





 


Glucose Level


 268 mg/dL


(70-99) 


 


 





 


Calcium Level


 8.1 mg/dL


(8.5-10.1) 


 


 





 


Phosphorus Level


 3.7 mg/dL


(2.6-4.7) 


 


 





 


Magnesium Level


 1.7 mg/dL


(1.8-2.4) 


 


 





 


Ferritin


 972 ng/mL


(8-252) 


 


 





 


C-Reactive Protein,


Quantitative 44.5 mg/L


(0-3.3) 


 


 





 


Glucose (Fingerstick)


 


 113 mg/dL


(70-99) 


 





 


Neutrophils (%) (Auto)   45 % (31-73)  


 


Lymphocytes (%) (Auto)   29 % (24-48)  


 


Monocytes (%) (Auto)   24 % (0-9)  


 


Eosinophils (%) (Auto)   1 % (0-3)  


 


Basophils (%) (Auto)   1 % (0-3)  


 


Neutrophils # (Auto)


 


 


 0.7 x10^3/uL


(1.8-7.7) 





 


Lymphocytes # (Auto)


 


 


 0.4 x10^3/uL


(1.0-4.8) 





 


Monocytes # (Auto)


 


 


 0.4 x10^3/uL


(0.0-1.1) 





 


Eosinophils # (Auto)


 


 


 0.0 x10^3/uL


(0.0-0.7) 





 


Basophils # (Auto)


 


 


 0.0 x10^3/uL


(0.0-0.2) 





 


Coronavirus (COVID-19)(PCR)


 


 


 


 Not detected


(NOT DETECT.)


 


Test


 20


16:00 20


17:09 20


06:40 20


07:29


 


Sodium Level


 138 mmol/L


(136-145) 


 137 mmol/L


(136-145) 





 


Potassium Level


 3.7 mmol/L


(3.5-5.1) 


 3.5 mmol/L


(3.5-5.1) 





 


Chloride Level


 100 mmol/L


() 


 102 mmol/L


() 





 


Carbon Dioxide Level


 27 mmol/L


(21-32) 


 28 mmol/L


(21-32) 





 


Anion Gap 11 (6-14)   7 (6-14)  


 


Blood Urea Nitrogen 7 mg/dL (7-20)   7 mg/dL (7-20)  


 


Creatinine


 0.7 mg/dL


(0.6-1.0) 


 0.6 mg/dL


(0.6-1.0) 





 


Estimated GFR


(Cockcroft-Gault) 85.9 


 


 102.7 


 





 


BUN/Creatinine Ratio 10 (6-20)    


 


Glucose Level


 266 mg/dL


(70-99) 


 259 mg/dL


(70-99) 





 


Calcium Level


 8.5 mg/dL


(8.5-10.1) 


 8.2 mg/dL


(8.5-10.1) 





 


Total Bilirubin


 0.4 mg/dL


(0.2-1.0) 


 


 





 


Aspartate Amino Transf


(AST/SGOT) 17 U/L (15-37) 


 


 


 





 


Alanine Aminotransferase


(ALT/SGPT) 23 U/L (14-59) 


 


 


 





 


Alkaline Phosphatase


 104 U/L


() 


 


 





 


Total Protein


 7.7 g/dL


(6.4-8.2) 


 


 





 


Albumin


 3.0 g/dL


(3.4-5.0) 


 


 





 


Albumin/Globulin Ratio 0.6 (1.0-1.7)    


 


Glucose (Fingerstick)


 


 240 mg/dL


(70-99) 


 244 mg/dL


(70-99)


 


White Blood Count


 


 


 2.0 x10^3/uL


(4.0-11.0) 





 


Red Blood Count


 


 


 3.80 x10^6/uL


(3.50-5.40) 





 


Hemoglobin


 


 


 10.5 g/dL


(12.0-15.5) 





 


Hematocrit


 


 


 31.8 %


(36.0-47.0) 





 


Mean Corpuscular Volume   84 fL ()  


 


Mean Corpuscular Hemoglobin   28 pg (25-35)  


 


Mean Corpuscular Hemoglobin


Concent 


 


 33 g/dL


(31-37) 





 


Red Cell Distribution Width


 


 


 15.4 %


(11.5-14.5) 





 


Platelet Count


 


 


 273 x10^3/uL


(140-400) 





 


Neutrophils (%) (Auto)   37 % (31-73)  


 


Lymphocytes (%) (Auto)   37 % (24-48)  


 


Monocytes (%) (Auto)   22 % (0-9)  


 


Eosinophils (%) (Auto)   3 % (0-3)  


 


Basophils (%) (Auto)   1 % (0-3)  


 


Neutrophils # (Auto)


 


 


 0.7 x10^3/uL


(1.8-7.7) 





 


Lymphocytes # (Auto)


 


 


 0.7 x10^3/uL


(1.0-4.8) 





 


Monocytes # (Auto)


 


 


 0.4 x10^3/uL


(0.0-1.1) 





 


Eosinophils # (Auto)


 


 


 0.1 x10^3/uL


(0.0-0.7) 





 


Basophils # (Auto)


 


 


 0.0 x10^3/uL


(0.0-0.2) 





 


Phosphorus Level


 


 


 3.6 mg/dL


(2.6-4.7) 





 


Magnesium Level


 


 


 2.0 mg/dL


(1.8-2.4) 





 


Test


 20


11:05 


 


 





 


Glucose (Fingerstick)


 256 mg/dL


(70-99) 


 


 











Laboratory Tests








Test


 20


15:10 20


16:00 20


17:09 20


06:40


 


Coronavirus (COVID-19)(PCR)


 Not detected


(NOT DETECT.) 


 


 





 


Sodium Level


 


 138 mmol/L


(136-145) 


 137 mmol/L


(136-145)


 


Potassium Level


 


 3.7 mmol/L


(3.5-5.1) 


 3.5 mmol/L


(3.5-5.1)


 


Chloride Level


 


 100 mmol/L


() 


 102 mmol/L


()


 


Carbon Dioxide Level


 


 27 mmol/L


(21-32) 


 28 mmol/L


(21-32)


 


Anion Gap  11 (6-14)   7 (6-14) 


 


Blood Urea Nitrogen  7 mg/dL (7-20)   7 mg/dL (7-20) 


 


Creatinine


 


 0.7 mg/dL


(0.6-1.0) 


 0.6 mg/dL


(0.6-1.0)


 


Estimated GFR


(Cockcroft-Gault) 


 85.9 


 


 102.7 





 


BUN/Creatinine Ratio  10 (6-20)   


 


Glucose Level


 


 266 mg/dL


(70-99) 


 259 mg/dL


(70-99)


 


Calcium Level


 


 8.5 mg/dL


(8.5-10.1) 


 8.2 mg/dL


(8.5-10.1)


 


Total Bilirubin


 


 0.4 mg/dL


(0.2-1.0) 


 





 


Aspartate Amino Transf


(AST/SGOT) 


 17 U/L (15-37) 


 


 





 


Alanine Aminotransferase


(ALT/SGPT) 


 23 U/L (14-59) 


 


 





 


Alkaline Phosphatase


 


 104 U/L


() 


 





 


Total Protein


 


 7.7 g/dL


(6.4-8.2) 


 





 


Albumin


 


 3.0 g/dL


(3.4-5.0) 


 





 


Albumin/Globulin Ratio  0.6 (1.0-1.7)   


 


Glucose (Fingerstick)


 


 


 240 mg/dL


(70-99) 





 


White Blood Count


 


 


 


 2.0 x10^3/uL


(4.0-11.0)


 


Red Blood Count


 


 


 


 3.80 x10^6/uL


(3.50-5.40)


 


Hemoglobin


 


 


 


 10.5 g/dL


(12.0-15.5)


 


Hematocrit


 


 


 


 31.8 %


(36.0-47.0)


 


Mean Corpuscular Volume    84 fL () 


 


Mean Corpuscular Hemoglobin    28 pg (25-35) 


 


Mean Corpuscular Hemoglobin


Concent 


 


 


 33 g/dL


(31-37)


 


Red Cell Distribution Width


 


 


 


 15.4 %


(11.5-14.5)


 


Platelet Count


 


 


 


 273 x10^3/uL


(140-400)


 


Neutrophils (%) (Auto)    37 % (31-73) 


 


Lymphocytes (%) (Auto)    37 % (24-48) 


 


Monocytes (%) (Auto)    22 % (0-9) 


 


Eosinophils (%) (Auto)    3 % (0-3) 


 


Basophils (%) (Auto)    1 % (0-3) 


 


Neutrophils # (Auto)


 


 


 


 0.7 x10^3/uL


(1.8-7.7)


 


Lymphocytes # (Auto)


 


 


 


 0.7 x10^3/uL


(1.0-4.8)


 


Monocytes # (Auto)


 


 


 


 0.4 x10^3/uL


(0.0-1.1)


 


Eosinophils # (Auto)


 


 


 


 0.1 x10^3/uL


(0.0-0.7)


 


Basophils # (Auto)


 


 


 


 0.0 x10^3/uL


(0.0-0.2)


 


Phosphorus Level


 


 


 


 3.6 mg/dL


(2.6-4.7)


 


Magnesium Level


 


 


 


 2.0 mg/dL


(1.8-2.4)


 


Test


 20


07:29 20


11:05 


 





 


Glucose (Fingerstick)


 244 mg/dL


(70-99) 256 mg/dL


(70-99) 


 











Medications





Current Medications


Iohexol (Omnipaque 300 Mg/ml) 75 ml 1X  ONCE IV  Last administered on 20at 

11:30;  Start 20 at 11:30;  Stop 20 at 11:31;  Status DC


Iohexol (Omnipaque 240 Mg/ml) 50 ml 1X  ONCE PO  Last administered on 20at 

11:30;  Start 20 at 11:30;  Stop 20 at 11:31;  Status DC


Info (CONTRAST GIVEN -- Rx MONITORING) 1 each PRN DAILY  PRN MC SEE COMMENTS;  

Start 20 at 11:30;  Stop 20 at 11:29;  Status DC


Sodium Chloride 1,000 ml @  0 mls/hr 1X  ONCE IV  Last administered on 20at

12:27;  Start 20 at 12:15;  Stop 20 at 12:16;  Status DC


Morphine Sulfate (Morphine Sulfate) 5 mg 1X  ONCE IV  Last administered on 

20at 12:45;  Start 20 at 12:30;  Stop 20 at 12:33;  Status DC


Morphine Sulfate (Morphine Sulfate) 5 mg 1X  ONCE IV ;  Start 20 at 13:45; 

Stop 20 at 13:46;  Status DC


Morphine Sulfate (Morphine Sulfate) 4 mg PRN Q2HR  PRN IV MODERATE TO SEVERE 

PAIN Last administered on 20at 16:18;  Start 20 at 14:45;  Stop 20

at 10:23;  Status DC


Sodium Chloride 1,000 ml @  100 mls/hr Q10H IV  Last administered on 20at 

08:27;  Start 20 at 14:44;  Stop 20 at 10:23;  Status DC


Ondansetron HCl (Zofran) 4 mg PRN Q4HRS  PRN IV NAUSEA/VOMITING Last 

administered on 20at 14:06;  Start 20 at 14:45


Acetaminophen (Tylenol Supp) 650 mg PRN Q4HRS  PRN ME TEMP OVER 100.4F OR MILD 

PAIN;  Start 20 at 14:45


Enoxaparin Sodium (Lovenox 40mg Syringe) 40 mg Q24H SQ  Last administered on 

20at 14:13;  Start 20 at 15:00


Insulin Human Lispro (HumaLOG) 0-9 UNITS Q4HRS SQ  Last administered on 

20at 18:15;  Start 20 at 16:00;  Stop 20 at 19:32;  Status DC


Dextrose (Dextrose 50%-Water Syringe) 12.5 gm PRN Q15MIN  PRN IV SEE COMMENTS;  

Start 20 at 14:45;  Stop 20 at 13:20;  Status DC


Albuterol Sulfate (Ventolin Neb Soln) 3 mg PRN QID  PRN NEB SHORTNESS OF BREATH;

 Start 20 at 15:15


Sodium Chloride (Saline Mist Nasal) 1 néstor PRN Q1HR  PRN NS NASAL CONGESTION;  

Start 20 at 15:15


Methylprednisolone Sodium Succinate (SOLU-Medrol 40MG VIAL) 40 mg DAILY IV  Last

administered on 20at 08:21;  Start 20 at 15:15;  Stop 20 at 

13:44;  Status DC


Insulin Human Regular 100 unit/ Sodium Chloride 101 ml @ 0 mls/hr CONT  PRN IV 

SEE I/O RECORD Last administered on 20at 02:12;  Start 20 at 18:00


Fentanyl Citrate (Fentanyl 2ml Vial) 50 mcg PRN Q2HR  PRN IVP MOD TO SEVERE 

PAIN, 2ND CHOICE Last administered on 20at 16:21;  Start 20 at 11:45


Famotidine (Pepcid Vial) 20 mg QHS IVP  Last administered on 20at 22:14;  

Start 20 at 21:00;  Stop 20 at 10:21;  Status DC


Prednisone (Prednisone) 3 mg DAILY PO  Last administered on 20at 10:29;  

Start 20 at 09:00


Potassium Chloride/Water 100 ml @  100 mls/hr Q1H IV  Last administered on 

20at 15:22;  Start 20 at 11:00;  Stop 20 at 14:59;  Status DC


Potassium Chloride (Klor-Con) 40 meq 1X  ONCE PO  Last administered on 20at

11:01;  Start 20 at 10:15;  Stop 20 at 10:22;  Status DC


Tramadol HCl (Ultram) 50 mg PRN Q6HRS  PRN PO MODERATE PAIN 4-6 Last 

administered on 20at 06:08;  Start 20 at 10:15


Oxycodone HCl (Roxicodone) 5 mg PRN Q6HRS  PRN PO SEVERE PAIN 7-10 Last 

administered on 20at 11:08;  Start 20 at 10:15;  Stop 20 at 

15:30;  Status DC


Magnesium Sulfate 100 ml @  25 mls/hr 1X  ONCE IV  Last administered on 

20at 11:06;  Start 20 at 11:00;  Stop 20 at 14:59;  Status DC


Magnesium Hydroxide (Milk Of Magnesia) 2,400 mg PRN DAILY  PRN PO CONSTIPATION 

2ND CHOICE;  Start 20 at 15:30


Psyllium Hydrophilic Mucilloid (Metamucil Fiber Packet) 1 pkt DAILY PO  Last 

administered on  10:29;  Start 20 at 15:30


Polyethylene Glycol (miraLAX PACKET) 17 gm DAILY PO  Last administered on 

 10:29;  Start 20 at 15:30


Oxycodone HCl (Roxicodone) 5 mg PRN Q4HRS  PRN PO SEVERE PAIN 7-10 Last 

administered on 20at 10:30;  Start 20 at 15:30


Insulin Human Lispro (HumaLOG) 0-9 UNITS [id number] SQ  Last administered on 

20at 17:14;  Start 20 at 19:30;  Stop 20 at 06:31;  Status DC


Famotidine (Pepcid) 20 mg QHS PO  Last administered on 20at 20:57;  Start 

20 at 21:00;  Stop 20 at 10:56;  Status DC


Iohexol (Omnipaque 300 Mg/ml) 75 ml 1X  ONCE IV  Last administered on 20at 

11:45;  Start 20 at 11:45;  Stop 20 at 11:46;  Status DC


Info (CONTRAST GIVEN -- Rx MONITORING) 1 each PRN DAILY  PRN MC SEE COMMENTS;  

Start 20 at 11:45;  Stop 6/3/20 at 11:44;  Status DC


Ringer's Solution 1,000 ml @  75 mls/hr [id number] IV  Last administered on 

20at 01:53;  Start 20 at 09:30


Potassium Chloride/Water 100 ml @  100 mls/hr Q1H IV  Last administered on 

20at 21:27;  Start 20 at 10:00;  Stop 20 at 21:59;  Status DC


Famotidine (Pepcid Vial) 20 mg QHS IVP  Last administered on 20at 22:03;  

Start 20 at 21:00


Bisacodyl (Dulcolax Supp) 10 mg PRN DAILY  PRN ME CONSTIPATION;  Start 20 at

11:00


Leucovorin Calcium (Wellcovorin) 5 mg DAILY PO  Last administered on 6/3/20at 

09:53;  Start 6/3/20 at 10:00;  Stop 20 at 09:24;  Status DC


Info (Tpn Per Pharmacy) 1 each PRN DAILY  PRN MC SEE COMMENTS Last administered 

on 20at 12:58;  Start 6/3/20 at 12:00


Potassium Chloride/Water 100 ml @  100 mls/hr Q1H IV  Last administered on 

20at 01:53;  Start 6/3/20 at 20:00;  Stop 6/3/20 at 23:59;  Status DC


Leucovorin Calcium (Wellcovorin) 15 mg Q8HRS PO  Last administered on 20at 

12:50;  Start 20 at 10:00


Lidocaine HCl (Buffered Lidocaine 1%) 3 ml 1X  ONCE INJ ;  Start 20 at 

12:00;  Stop 20 at 12:01;  Status DC


Lidocaine HCl (Buffered Lidocaine 1%) 3 ml STK-MED ONCE .ROUTE ;  Start 20 

at 11:58;  Stop 20 at 11:59;  Status DC


Sodium Chloride 90 meq/Potassium Chloride 50 meq/ Potassium Phosphate 13.6 

mmol/Magnesium Sulfate 10 meq/ Calcium Gluconate 10 meq/ Multivitamins 10 

ml/Chromium/ Copper/Manganese/ Seleni/Zn 1 ml/ Total Parenteral Nutrition/Amino 

Acids/Dextrose/ Fat Emulsion Intravenous 1,512 ml @  63 mls/hr TPN  CONT IV  

Last administered on 20at 22:12;  Start 20 at 22:00;  Stop 20 at 

21:59;  Status DC


Magnesium Sulfate/ Dextrose 100 ml @  100 mls/hr 1X  ONCE IV  Last administered 

on 20at 16:16;  Start 20 at 16:00;  Stop 20 at 16:59;  Status DC


Insulin Human Lispro (HumaLOG) 0-5 UNITS TIDWMEALS SQ ;  Start 20 at 08:00; 

Status Cancel


Dextrose (Dextrose 50%-Water Syringe) 12.5 gm PRN Q15MIN  PRN IV SEE COMMENTS;  

Start 20 at 07:45;  Status Cancel


Insulin Human Lispro (HumaLOG) 0-7 UNITS TIDWMEALS SQ ;  Start 20 at 08:00; 

Stop 20 at 08:00;  Status DC


Dextrose (Dextrose 50%-Water Syringe) 12.5 gm PRN Q15MIN  PRN IV SEE COMMENTS;  

Start 20 at 08:00;  Status Cancel


Insulin Human Lispro (HumaLOG) 0-9 UNITS TIDWMEALS SQ  Last administered on 

20at 12:54;  Start 20 at 08:00


Dextrose (Dextrose 50%-Water Syringe) 12.5 gm PRN Q15MIN  PRN IV SEE COMMENTS;  

Start 20 at 08:00;  Stop 20 at 13:17;  Status DC


Potassium Chloride (Klor-Con) 40 meq 1X  ONCE PO  Last administered on 20at 

11:03;  Start 20 at 10:45;  Stop 20 at 10:46;  Status DC


Potassium Chloride (Klor-Con) 20 meq DAILYWBKFT PO  Last administered on 

20at 10:29;  Start 20 at 08:00


Magnesium Sulfate 50 ml @ 25 mls/hr 1X  ONCE IV  Last administered on 20at 

11:04;  Start 20 at 10:45;  Stop 20 at 12:44;  Status DC


Dextrose (Dextrose 50%-Water Syringe) 12.5 gm PRN Q15MIN  PRN IV SEE COMMENTS;  

Start 20 at 13:30


Sodium Chloride 90 meq/Potassium Chloride 70 meq/ Potassium Phosphate 13.6 

mmol/Magnesium Sulfate 18 meq/ Calcium Gluconate 10 meq/ Multivitamins 10 

ml/Chromium/ Copper/Manganese/ Seleni/Zn 1 ml/ Total Parenteral Nutrition/Amino 

Acids/Dextrose/ Fat Emulsion Intravenous 1,512 ml @  63 mls/hr TPN  CONT IV  

Last administered on 20at 22:02;  Start 20 at 22:00;  Stop 20 at 

21:59


Methylprednisolone Sodium Succinate (SOLU-Medrol 125MG VIAL) 1,000 mg DAILY08 IV

;  Start 20 at 08:00;  Stop 20 at 07:59;  Status UNV


Methylprednisolone Sodium Succinate 1000 mg/Sodium Chloride 100 ml @  100 mls/hr

Q24H IV  Last administered on 20at 10:30;  Start 20 at 08:00;  Stop 

20 at 07:59


Sodium Chloride 90 meq/Potassium Chloride 70 meq/ Potassium Phosphate 13.6 

mmol/Magnesium Sulfate 18 meq/ Calcium Gluconate 10 meq/ Multivitamins 10 

ml/Chromium/ Copper/Manganese/ Seleni/Zn 1 ml/ Total Parenteral Nutrition/Amino 

Acids/Dextrose/ Fat Emulsion Intravenous 1,512 ml @  63 mls/hr TPN  CONT IV ;  

Start 20 at 22:00;  Stop 20 at 21:59





Active Scripts


Active


Reported


Zyrtec (Cetirizine Hcl) 10 Mg Capsule 10 Mg PO DAILY


Methotrexate (Methotrexate Sodium) 2.5 Mg Tablet 8 Tab PO WEEKLY


Protonix  ** (Pantoprazole Sodium) 40 Mg Tablet.dr 40 Mg PO DAILYAC


Folic Acid 0.8 Mg Capsule 1 Cap PO DAILY 30 Days


Ferrous Sulfate 325 Mg Tablet 1 Tab PO DAILY


Vitals/I & O





Vital Sign - Last 24 Hours








 20





 16:24 17:29 19:00 20:00


 


Temp   97.8 





   97.8 


 


Pulse   102 


 


Resp   20 


 


B/P (MAP)   120/68 (85) 


 


Pulse Ox   96 


 


O2 Delivery Room Air Room Air Room Air Room Air


 


    





    





 20





 22:02 23:02 23:05 03:07


 


Temp   98.0 97.9





   98.0 97.9


 


Pulse   82 76


 


Resp 20 20 20 18


 


B/P (MAP)   146/80 (102) 126/78 (94)


 


Pulse Ox   95 98


 


O2 Delivery Room Air Nasal Cannula Room Air Room Air


 


    





    





 20





 04:33 05:33 07:20 10:30


 


Temp   97.8 





   97.8 


 


Pulse   82 


 


Resp 20 20 20 16


 


B/P (MAP)   144/88 (106) 


 


Pulse Ox   98 


 


O2 Delivery Nasal Cannula Nasal Cannula Room Air Room Air


 


    





    





 20   





 11:26   


 


Temp 97.4   





 97.4   


 


Pulse 80   


 


Resp 18   


 


B/P (MAP) 145/75 (98)   


 


Pulse Ox 94   


 


O2 Delivery Room Air   














Intake and Output 0  


 


 20





 15:00 23:00 07:00


 


Intake Total  720 ml 


 


Output Total 900 ml  


 


Balance -900 ml 720 ml 











Nutrition Consultation


Dietary Evaluation:


Recommendations by RD:  Dietary education by RD, Increase Calorie Intake, 

PPN/TPN


Comments:  


REC TPN per followin g dextrose, 65 g AA, 20 g lipids





REC advance diet as tolerated, goal diet gluten free/ADA (A1c 13.6)  


w/protein supplements per pt choice


Expected Outcomes/Goals:  


TPN to meet 65% - 7% % est needs while NPO





diet advancement





Malnutrition Findings:


Food and Nutrition Intake (Mod:  <75% est energy req 7days


Weight Status:  Appropriate





Hemodynamically unstable?:  No


Is patient in severe pain?:  No


Is NPO status required?:  No











ERWIN DUNLAP MD              2020 15:11

## 2020-06-06 NOTE — PDOC
PROGRESS NOTES


Subjective


Subjective


some pain, but states it was better than earlier





Objective


Objective





Vital Signs








  Date Time  Temp Pulse Resp B/P (MAP) Pulse Ox O2 Delivery O2 Flow Rate FiO2


 


20 11:26 97.4 80 18 145/75 (98) 94 Room Air  





 97.4       


 


20 20:00       2.0 














Intake and Output 


 


 20





 07:00


 


Intake Total 720 ml


 


Output Total 900 ml


 


Balance -180 ml


 


 


 


Intake Oral 720 ml


 


Output Urine Total 900 ml


 


# Voids 5











Physical Exam


Abdomen:  Soft (minimally tender with palpation)


Heart:  Regular rate


General:  Alert, Oriented X3, Cooperative


HEENT:  Atraumatic


Neuro:  Normal speech





Assessment


Assessment


Problems


Medical Problems:


(1) Dehydration


Status: Acute  





(2) Hyperglycemia


Status: Acute  





(3) Pancreatitis, acute


Status: Acute  











Plan


Plan of Care


Supportive tx,  no surgery recs





Comment


Review of Relevant


I have reviewed the following items elizabeth (where applicable) has been applied.


Labs





Laboratory Tests








Test


 20


16:51 20


20:26 20


23:54 20


05:59


 


Glucose (Fingerstick)


 140 mg/dL


(70-99) 131 mg/dL


(70-99) 188 mg/dL


(70-99) 251 mg/dL


(70-99)


 


Test


 20


06:45 20


11:37 20


14:45 20


15:10


 


White Blood Count


 1.8 x10^3/uL


(4.0-11.0) 


 1.5 x10^3/uL


(4.0-11.0) 





 


Red Blood Count


 4.06 x10^6/uL


(3.50-5.40) 


 4.31 x10^6/uL


(3.50-5.40) 





 


Hemoglobin


 11.2 g/dL


(12.0-15.5) 


 12.3 g/dL


(12.0-15.5) 





 


Hematocrit


 33.9 %


(36.0-47.0) 


 38.0 %


(36.0-47.0) 





 


Mean Corpuscular Volume 84 fL ()   88 fL ()  


 


Mean Corpuscular Hemoglobin 28 pg (25-35)   29 pg (25-35)  


 


Mean Corpuscular Hemoglobin


Concent 33 g/dL


(31-37) 


 32 g/dL


(31-37) 





 


Red Cell Distribution Width


 15.4 %


(11.5-14.5) 


 16.3 %


(11.5-14.5) 





 


Platelet Count


 287 x10^3/uL


(140-400) 


 265 x10^3/uL


(140-400) 





 


Sodium Level


 135 mmol/L


(136-145) 


 


 





 


Potassium Level


 3.1 mmol/L


(3.5-5.1) 


 


 





 


Chloride Level


 101 mmol/L


() 


 


 





 


Carbon Dioxide Level


 27 mmol/L


(21-32) 


 


 





 


Anion Gap 7 (6-14)    


 


Blood Urea Nitrogen 8 mg/dL (7-20)    


 


Creatinine


 0.6 mg/dL


(0.6-1.0) 


 


 





 


Estimated GFR


(Cockcroft-Gault) 102.7 


 


 


 





 


Glucose Level


 268 mg/dL


(70-99) 


 


 





 


Calcium Level


 8.1 mg/dL


(8.5-10.1) 


 


 





 


Phosphorus Level


 3.7 mg/dL


(2.6-4.7) 


 


 





 


Magnesium Level


 1.7 mg/dL


(1.8-2.4) 


 


 





 


Ferritin


 972 ng/mL


(8-252) 


 


 





 


C-Reactive Protein,


Quantitative 44.5 mg/L


(0-3.3) 


 


 





 


Glucose (Fingerstick)


 


 113 mg/dL


(70-99) 


 





 


Neutrophils (%) (Auto)   45 % (31-73)  


 


Lymphocytes (%) (Auto)   29 % (24-48)  


 


Monocytes (%) (Auto)   24 % (0-9)  


 


Eosinophils (%) (Auto)   1 % (0-3)  


 


Basophils (%) (Auto)   1 % (0-3)  


 


Neutrophils # (Auto)


 


 


 0.7 x10^3/uL


(1.8-7.7) 





 


Lymphocytes # (Auto)


 


 


 0.4 x10^3/uL


(1.0-4.8) 





 


Monocytes # (Auto)


 


 


 0.4 x10^3/uL


(0.0-1.1) 





 


Eosinophils # (Auto)


 


 


 0.0 x10^3/uL


(0.0-0.7) 





 


Basophils # (Auto)


 


 


 0.0 x10^3/uL


(0.0-0.2) 





 


Coronavirus (COVID-19)(PCR)


 


 


 


 Not detected


(NOT DETECT.)


 


Test


 20


16:00 20


17:09 20


06:40 20


07:29


 


Sodium Level


 138 mmol/L


(136-145) 


 137 mmol/L


(136-145) 





 


Potassium Level


 3.7 mmol/L


(3.5-5.1) 


 3.5 mmol/L


(3.5-5.1) 





 


Chloride Level


 100 mmol/L


() 


 102 mmol/L


() 





 


Carbon Dioxide Level


 27 mmol/L


(21-32) 


 28 mmol/L


(21-32) 





 


Anion Gap 11 (6-14)   7 (6-14)  


 


Blood Urea Nitrogen 7 mg/dL (7-20)   7 mg/dL (7-20)  


 


Creatinine


 0.7 mg/dL


(0.6-1.0) 


 0.6 mg/dL


(0.6-1.0) 





 


Estimated GFR


(Cockcroft-Gault) 85.9 


 


 102.7 


 





 


BUN/Creatinine Ratio 10 (6-20)    


 


Glucose Level


 266 mg/dL


(70-99) 


 259 mg/dL


(70-99) 





 


Calcium Level


 8.5 mg/dL


(8.5-10.1) 


 8.2 mg/dL


(8.5-10.1) 





 


Total Bilirubin


 0.4 mg/dL


(0.2-1.0) 


 


 





 


Aspartate Amino Transf


(AST/SGOT) 17 U/L (15-37) 


 


 


 





 


Alanine Aminotransferase


(ALT/SGPT) 23 U/L (14-59) 


 


 


 





 


Alkaline Phosphatase


 104 U/L


() 


 


 





 


Total Protein


 7.7 g/dL


(6.4-8.2) 


 


 





 


Albumin


 3.0 g/dL


(3.4-5.0) 


 


 





 


Albumin/Globulin Ratio 0.6 (1.0-1.7)    


 


Glucose (Fingerstick)


 


 240 mg/dL


(70-99) 


 244 mg/dL


(70-99)


 


White Blood Count


 


 


 2.0 x10^3/uL


(4.0-11.0) 





 


Red Blood Count


 


 


 3.80 x10^6/uL


(3.50-5.40) 





 


Hemoglobin


 


 


 10.5 g/dL


(12.0-15.5) 





 


Hematocrit


 


 


 31.8 %


(36.0-47.0) 





 


Mean Corpuscular Volume   84 fL ()  


 


Mean Corpuscular Hemoglobin   28 pg (25-35)  


 


Mean Corpuscular Hemoglobin


Concent 


 


 33 g/dL


(31-37) 





 


Red Cell Distribution Width


 


 


 15.4 %


(11.5-14.5) 





 


Platelet Count


 


 


 273 x10^3/uL


(140-400) 





 


Neutrophils (%) (Auto)   37 % (31-73)  


 


Lymphocytes (%) (Auto)   37 % (24-48)  


 


Monocytes (%) (Auto)   22 % (0-9)  


 


Eosinophils (%) (Auto)   3 % (0-3)  


 


Basophils (%) (Auto)   1 % (0-3)  


 


Neutrophils # (Auto)


 


 


 0.7 x10^3/uL


(1.8-7.7) 





 


Lymphocytes # (Auto)


 


 


 0.7 x10^3/uL


(1.0-4.8) 





 


Monocytes # (Auto)


 


 


 0.4 x10^3/uL


(0.0-1.1) 





 


Eosinophils # (Auto)


 


 


 0.1 x10^3/uL


(0.0-0.7) 





 


Basophils # (Auto)


 


 


 0.0 x10^3/uL


(0.0-0.2) 





 


Phosphorus Level


 


 


 3.6 mg/dL


(2.6-4.7) 





 


Magnesium Level


 


 


 2.0 mg/dL


(1.8-2.4) 





 


Test


 20


11:05 


 


 





 


Glucose (Fingerstick)


 256 mg/dL


(70-99) 


 


 











Laboratory Tests








Test


 20


14:45 20


15:10 20


16:00 20


17:09


 


White Blood Count


 1.5 x10^3/uL


(4.0-11.0) 


 


 





 


Red Blood Count


 4.31 x10^6/uL


(3.50-5.40) 


 


 





 


Hemoglobin


 12.3 g/dL


(12.0-15.5) 


 


 





 


Hematocrit


 38.0 %


(36.0-47.0) 


 


 





 


Mean Corpuscular Volume 88 fL ()    


 


Mean Corpuscular Hemoglobin 29 pg (25-35)    


 


Mean Corpuscular Hemoglobin


Concent 32 g/dL


(31-37) 


 


 





 


Red Cell Distribution Width


 16.3 %


(11.5-14.5) 


 


 





 


Platelet Count


 265 x10^3/uL


(140-400) 


 


 





 


Neutrophils (%) (Auto) 45 % (31-73)    


 


Lymphocytes (%) (Auto) 29 % (24-48)    


 


Monocytes (%) (Auto) 24 % (0-9)    


 


Eosinophils (%) (Auto) 1 % (0-3)    


 


Basophils (%) (Auto) 1 % (0-3)    


 


Neutrophils # (Auto)


 0.7 x10^3/uL


(1.8-7.7) 


 


 





 


Lymphocytes # (Auto)


 0.4 x10^3/uL


(1.0-4.8) 


 


 





 


Monocytes # (Auto)


 0.4 x10^3/uL


(0.0-1.1) 


 


 





 


Eosinophils # (Auto)


 0.0 x10^3/uL


(0.0-0.7) 


 


 





 


Basophils # (Auto)


 0.0 x10^3/uL


(0.0-0.2) 


 


 





 


Coronavirus (COVID-19)(PCR)


 


 Not detected


(NOT DETECT.) 


 





 


Sodium Level


 


 


 138 mmol/L


(136-145) 





 


Potassium Level


 


 


 3.7 mmol/L


(3.5-5.1) 





 


Chloride Level


 


 


 100 mmol/L


() 





 


Carbon Dioxide Level


 


 


 27 mmol/L


(21-32) 





 


Anion Gap   11 (6-14)  


 


Blood Urea Nitrogen   7 mg/dL (7-20)  


 


Creatinine


 


 


 0.7 mg/dL


(0.6-1.0) 





 


Estimated GFR


(Cockcroft-Gault) 


 


 85.9 


 





 


BUN/Creatinine Ratio   10 (6-20)  


 


Glucose Level


 


 


 266 mg/dL


(70-99) 





 


Calcium Level


 


 


 8.5 mg/dL


(8.5-10.1) 





 


Total Bilirubin


 


 


 0.4 mg/dL


(0.2-1.0) 





 


Aspartate Amino Transf


(AST/SGOT) 


 


 17 U/L (15-37) 


 





 


Alanine Aminotransferase


(ALT/SGPT) 


 


 23 U/L (14-59) 


 





 


Alkaline Phosphatase


 


 


 104 U/L


() 





 


Total Protein


 


 


 7.7 g/dL


(6.4-8.2) 





 


Albumin


 


 


 3.0 g/dL


(3.4-5.0) 





 


Albumin/Globulin Ratio   0.6 (1.0-1.7)  


 


Glucose (Fingerstick)


 


 


 


 240 mg/dL


(70-99)


 


Test


 20


06:40 20


07:29 20


11:05 





 


White Blood Count


 2.0 x10^3/uL


(4.0-11.0) 


 


 





 


Red Blood Count


 3.80 x10^6/uL


(3.50-5.40) 


 


 





 


Hemoglobin


 10.5 g/dL


(12.0-15.5) 


 


 





 


Hematocrit


 31.8 %


(36.0-47.0) 


 


 





 


Mean Corpuscular Volume 84 fL ()    


 


Mean Corpuscular Hemoglobin 28 pg (25-35)    


 


Mean Corpuscular Hemoglobin


Concent 33 g/dL


(31-37) 


 


 





 


Red Cell Distribution Width


 15.4 %


(11.5-14.5) 


 


 





 


Platelet Count


 273 x10^3/uL


(140-400) 


 


 





 


Neutrophils (%) (Auto) 37 % (31-73)    


 


Lymphocytes (%) (Auto) 37 % (24-48)    


 


Monocytes (%) (Auto) 22 % (0-9)    


 


Eosinophils (%) (Auto) 3 % (0-3)    


 


Basophils (%) (Auto) 1 % (0-3)    


 


Neutrophils # (Auto)


 0.7 x10^3/uL


(1.8-7.7) 


 


 





 


Lymphocytes # (Auto)


 0.7 x10^3/uL


(1.0-4.8) 


 


 





 


Monocytes # (Auto)


 0.4 x10^3/uL


(0.0-1.1) 


 


 





 


Eosinophils # (Auto)


 0.1 x10^3/uL


(0.0-0.7) 


 


 





 


Basophils # (Auto)


 0.0 x10^3/uL


(0.0-0.2) 


 


 





 


Sodium Level


 137 mmol/L


(136-145) 


 


 





 


Potassium Level


 3.5 mmol/L


(3.5-5.1) 


 


 





 


Chloride Level


 102 mmol/L


() 


 


 





 


Carbon Dioxide Level


 28 mmol/L


(21-32) 


 


 





 


Anion Gap 7 (6-14)    


 


Blood Urea Nitrogen 7 mg/dL (7-20)    


 


Creatinine


 0.6 mg/dL


(0.6-1.0) 


 


 





 


Estimated GFR


(Cockcroft-Gault) 102.7 


 


 


 





 


Glucose Level


 259 mg/dL


(70-99) 


 


 





 


Calcium Level


 8.2 mg/dL


(8.5-10.1) 


 


 





 


Phosphorus Level


 3.6 mg/dL


(2.6-4.7) 


 


 





 


Magnesium Level


 2.0 mg/dL


(1.8-2.4) 


 


 





 


Glucose (Fingerstick)


 


 244 mg/dL


(70-99) 256 mg/dL


(70-99) 











Medications





Current Medications


Iohexol (Omnipaque 300 Mg/ml) 75 ml 1X  ONCE IV  Last administered on 20at 

11:30;  Start 20 at 11:30;  Stop 20 at 11:31;  Status DC


Iohexol (Omnipaque 240 Mg/ml) 50 ml 1X  ONCE PO  Last administered on 20at 

11:30;  Start 20 at 11:30;  Stop 20 at 11:31;  Status DC


Info (CONTRAST GIVEN -- Rx MONITORING) 1 each PRN DAILY  PRN MC SEE COMMENTS;  

Start 20 at 11:30;  Stop 20 at 11:29;  Status DC


Sodium Chloride 1,000 ml @  0 mls/hr 1X  ONCE IV  Last administered on 20at

12:27;  Start 20 at 12:15;  Stop 20 at 12:16;  Status DC


Morphine Sulfate (Morphine Sulfate) 5 mg 1X  ONCE IV  Last administered on 

20at 12:45;  Start 20 at 12:30;  Stop 20 at 12:33;  Status DC


Morphine Sulfate (Morphine Sulfate) 5 mg 1X  ONCE IV ;  Start 20 at 13:45; 

Stop 20 at 13:46;  Status DC


Morphine Sulfate (Morphine Sulfate) 4 mg PRN Q2HR  PRN IV MODERATE TO SEVERE 

PAIN Last administered on 20at 16:18;  Start 20 at 14:45;  Stop 20

at 10:23;  Status DC


Sodium Chloride 1,000 ml @  100 mls/hr Q10H IV  Last administered on 20at 

08:27;  Start 20 at 14:44;  Stop 20 at 10:23;  Status DC


Ondansetron HCl (Zofran) 4 mg PRN Q4HRS  PRN IV NAUSEA/VOMITING Last 

administered on 20at 14:06;  Start 20 at 14:45


Acetaminophen (Tylenol Supp) 650 mg PRN Q4HRS  PRN UT TEMP OVER 100.4F OR MILD 

PAIN;  Start 20 at 14:45


Enoxaparin Sodium (Lovenox 40mg Syringe) 40 mg Q24H SQ  Last administered on 

20at 14:13;  Start 20 at 15:00


Insulin Human Lispro (HumaLOG) 0-9 UNITS Q4HRS SQ  Last administered on 

20at 18:15;  Start 20 at 16:00;  Stop 20 at 19:32;  Status DC


Dextrose (Dextrose 50%-Water Syringe) 12.5 gm PRN Q15MIN  PRN IV SEE COMMENTS;  

Start 20 at 14:45;  Stop 20 at 13:20;  Status DC


Albuterol Sulfate (Ventolin Neb Soln) 3 mg PRN QID  PRN NEB SHORTNESS OF BREATH;

 Start 20 at 15:15


Sodium Chloride (Saline Mist Nasal) 1 néstor PRN Q1HR  PRN NS NASAL CONGESTION;  

Start 20 at 15:15


Methylprednisolone Sodium Succinate (SOLU-Medrol 40MG VIAL) 40 mg DAILY IV  Last

administered on 20at 08:21;  Start 20 at 15:15;  Stop 20 at 

13:44;  Status DC


Insulin Human Regular 100 unit/ Sodium Chloride 101 ml @ 0 mls/hr CONT  PRN IV 

SEE I/O RECORD Last administered on 20at 02:12;  Start 20 at 18:00


Fentanyl Citrate (Fentanyl 2ml Vial) 50 mcg PRN Q2HR  PRN IVP MOD TO SEVERE 

PAIN, 2ND CHOICE Last administered on 20at 16:21;  Start 20 at 11:45


Famotidine (Pepcid Vial) 20 mg QHS IVP  Last administered on 20at 22:14;  

Start 20 at 21:00;  Stop 20 at 10:21;  Status DC


Prednisone (Prednisone) 3 mg DAILY PO  Last administered on 20at 10:29;  

Start 20 at 09:00


Potassium Chloride/Water 100 ml @  100 mls/hr Q1H IV  Last administered on 

20at 15:22;  Start 20 at 11:00;  Stop 20 at 14:59;  Status DC


Potassium Chloride (Klor-Con) 40 meq 1X  ONCE PO  Last administered on 20at

11:01;  Start 20 at 10:15;  Stop 20 at 10:22;  Status DC


Tramadol HCl (Ultram) 50 mg PRN Q6HRS  PRN PO MODERATE PAIN 4-6 Last 

administered on 20at 06:08;  Start 20 at 10:15


Oxycodone HCl (Roxicodone) 5 mg PRN Q6HRS  PRN PO SEVERE PAIN 7-10 Last 

administered on 20at 11:08;  Start 20 at 10:15;  Stop 20 at 

15:30;  Status DC


Magnesium Sulfate 100 ml @  25 mls/hr 1X  ONCE IV  Last administered on 

20at 11:06;  Start 20 at 11:00;  Stop 20 at 14:59;  Status DC


Magnesium Hydroxide (Milk Of Magnesia) 2,400 mg PRN DAILY  PRN PO CONSTIPATION 

2ND CHOICE;  Start 20 at 15:30


Psyllium Hydrophilic Mucilloid (Metamucil Fiber Packet) 1 pkt DAILY PO  Last 

administered on 20at 10:29;  Start 20 at 15:30


Polyethylene Glycol (miraLAX PACKET) 17 gm DAILY PO  Last administered on 

20at 10:29;  Start 20 at 15:30


Oxycodone HCl (Roxicodone) 5 mg PRN Q4HRS  PRN PO SEVERE PAIN 7-10 Last 

administered on 20at 10:30;  Start 20 at 15:30


Insulin Human Lispro (HumaLOG) 0-9 UNITS TIDWMEALHC SQ  Last administered on 

20at 17:14;  Start 20 at 19:30;  Stop 20 at 06:31;  Status DC


Famotidine (Pepcid) 20 mg QHS PO  Last administered on 20at 20:57;  Start 

20 at 21:00;  Stop 20 at 10:56;  Status DC


Iohexol (Omnipaque 300 Mg/ml) 75 ml 1X  ONCE IV  Last administered on 20at 

11:45;  Start 20 at 11:45;  Stop 20 at 11:46;  Status DC


Info (CONTRAST GIVEN -- Rx MONITORING) 1 each PRN DAILY  PRN MC SEE COMMENTS;  

Start 20 at 11:45;  Stop 6/3/20 at 11:44;  Status DC


Ringer's Solution 1,000 ml @  75 mls/hr [id number] IV  Last administered on 

20at 01:53;  Start 20 at 09:30


Potassium Chloride/Water 100 ml @  100 mls/hr Q1H IV  Last administered on 

20at 21:27;  Start 20 at 10:00;  Stop 20 at 21:59;  Status DC


Famotidine (Pepcid Vial) 20 mg QHS IVP  Last administered on 20at 22:03;  S

tart 20 at 21:00


Bisacodyl (Dulcolax Supp) 10 mg PRN DAILY  PRN UT CONSTIPATION;  Start 20 at

11:00


Leucovorin Calcium (Wellcovorin) 5 mg DAILY PO  Last administered on 6/3/20at 

09:53;  Start 6/3/20 at 10:00;  Stop 20 at 09:24;  Status DC


Info (Tpn Per Pharmacy) 1 each PRN DAILY  PRN MC SEE COMMENTS Last administered 

on 20at 13:31;  Start 6/3/20 at 12:00


Potassium Chloride/Water 100 ml @  100 mls/hr Q1H IV  Last administered on 

20at 01:53;  Start 6/3/20 at 20:00;  Stop 6/3/20 at 23:59;  Status DC


Leucovorin Calcium (Wellcovorin) 15 mg Q8HRS PO  Last administered on 20at 

05:43;  Start 20 at 10:00


Lidocaine HCl (Buffered Lidocaine 1%) 3 ml 1X  ONCE INJ ;  Start 20 at 

12:00;  Stop 20 at 12:01;  Status DC


Lidocaine HCl (Buffered Lidocaine 1%) 3 ml STK-MED ONCE .ROUTE ;  Start 20 

at 11:58;  Stop 20 at 11:59;  Status DC


Sodium Chloride 90 meq/Potassium Chloride 50 meq/ Potassium Phosphate 13.6 

mmol/Magnesium Sulfate 10 meq/ Calcium Gluconate 10 meq/ Multivitamins 10 ml/Ch

romium/ Copper/Manganese/ Seleni/Zn 1 ml/ Total Parenteral Nutrition/Amino 

Acids/Dextrose/ Fat Emulsion Intravenous 1,512 ml @  63 mls/hr TPN  CONT IV  

Last administered on 20at 22:12;  Start 20 at 22:00;  Stop 20 at 

21:59;  Status DC


Magnesium Sulfate/ Dextrose 100 ml @  100 mls/hr 1X  ONCE IV  Last administered 

on 20at 16:16;  Start 20 at 16:00;  Stop 20 at 16:59;  Status DC


Insulin Human Lispro (HumaLOG) 0-5 UNITS TIDWMEALS SQ ;  Start 20 at 08:00; 

Status Cancel


Dextrose (Dextrose 50%-Water Syringe) 12.5 gm PRN Q15MIN  PRN IV SEE COMMENTS;  

Start 20 at 07:45;  Status Cancel


Insulin Human Lispro (HumaLOG) 0-7 UNITS TIDWMEALS SQ ;  Start 20 at 08:00; 

Stop 20 at 08:00;  Status DC


Dextrose (Dextrose 50%-Water Syringe) 12.5 gm PRN Q15MIN  PRN IV SEE COMMENTS;  

Start 20 at 08:00;  Status Cancel


Insulin Human Lispro (HumaLOG) 0-9 UNITS TIDWMEALS SQ  Last administered on 

20at 10:43;  Start 20 at 08:00


Dextrose (Dextrose 50%-Water Syringe) 12.5 gm PRN Q15MIN  PRN IV SEE COMMENTS;  

Start 20 at 08:00;  Stop 20 at 13:17;  Status DC


Potassium Chloride (Klor-Con) 40 meq 1X  ONCE PO  Last administered on 20at 

11:03;  Start 20 at 10:45;  Stop 20 at 10:46;  Status DC


Potassium Chloride (Klor-Con) 20 meq DAILYWBKFT PO  Last administered on 

20at 10:29;  Start 20 at 08:00


Magnesium Sulfate 50 ml @ 25 mls/hr 1X  ONCE IV  Last administered on 20at 

11:04;  Start 20 at 10:45;  Stop 20 at 12:44;  Status DC


Dextrose (Dextrose 50%-Water Syringe) 12.5 gm PRN Q15MIN  PRN IV SEE COMMENTS;  

Start 20 at 13:30


Sodium Chloride 90 meq/Potassium Chloride 70 meq/ Potassium Phosphate 13.6 

mmol/Magnesium Sulfate 18 meq/ Calcium Gluconate 10 meq/ Multivitamins 10 

ml/Chromium/ Copper/Manganese/ Seleni/Zn 1 ml/ Total Parenteral Nutrition/Amino 

Acids/Dextrose/ Fat Emulsion Intravenous 1,512 ml @  63 mls/hr TPN  CONT IV  

Last administered on 20at 22:02;  Start 20 at 22:00;  Stop 20 at 

21:59


Methylprednisolone Sodium Succinate (SOLU-Medrol 125MG VIAL) 1,000 mg DAILY08 IV

;  Start 20 at 08:00;  Stop 20 at 07:59;  Status UNV


Methylprednisolone Sodium Succinate 1000 mg/Sodium Chloride 100 ml @  100 mls/hr

Q24H IV  Last administered on 20at 10:30;  Start 20 at 08:00;  Stop 

20 at 07:59





Active Scripts


Active


Reported


Zyrtec (Cetirizine Hcl) 10 Mg Capsule 10 Mg PO DAILY


Methotrexate (Methotrexate Sodium) 2.5 Mg Tablet 8 Tab PO WEEKLY


Protonix  ** (Pantoprazole Sodium) 40 Mg Tablet.dr 40 Mg PO DAILYAC


Folic Acid 0.8 Mg Capsule 1 Cap PO DAILY 30 Days


Ferrous Sulfate 325 Mg Tablet 1 Tab PO DAILY


Vitals/I & O





Vital Sign - Last 24 Hours








 20





 15:00 16:24 17:29 19:00


 


Temp 98.1   97.8





 98.1   97.8


 


Pulse 82   102


 


Resp 18   20


 


B/P (MAP) 140/71 (94)   120/68 (85)


 


Pulse Ox 98   96


 


O2 Delivery Room Air Room Air Room Air Room Air


 


    





    





 20





 20:00 22:02 23:02 23:05


 


Temp    98.0





    98.0


 


Pulse    82


 


Resp  20 20 20


 


B/P (MAP)    146/80 (102)


 


Pulse Ox    95


 


O2 Delivery Room Air Room Air Nasal Cannula Room Air


 


    





    





 6/6/20 6/6/20 6/6/20 6/6/20





 03:07 04:33 05:33 07:20


 


Temp 97.9   97.8





 97.9   97.8


 


Pulse 76   82


 


Resp 18 20 20 20


 


B/P (MAP) 126/78 (94)   144/88 (106)


 


Pulse Ox 98   98


 


O2 Delivery Room Air Nasal Cannula Nasal Cannula Room Air


 


    





    





 20  





 10:30 11:26  


 


Temp  97.4  





  97.4  


 


Pulse  80  


 


Resp 16 18  


 


B/P (MAP)  145/75 (98)  


 


Pulse Ox  94  


 


O2 Delivery Room Air Room Air  














Intake and Output   


 


 20





 15:00 23:00 07:00


 


Intake Total  720 ml 


 


Output Total 900 ml  


 


Balance -900 ml 720 ml 











Nutrition Consultation


Dietary Evaluation:


Recommendations by RD:  Dietary education by RD, Increase Calorie Intake, 

PPN/TPN


Comments:  


REC TPN per followin g dextrose, 65 g AA, 20 g lipids





REC advance diet as tolerated, goal diet gluten free/ADA (A1c 13.6)  


w/protein supplements per pt choice


Expected Outcomes/Goals:  


TPN to meet 65% - 7% % est needs while NPO





diet advancement





Malnutrition Findings:


Food and Nutrition Intake (Mod:  <75% est energy req 7days


Weight Status:  Appropriate





Hemodynamically unstable?:  No


Is patient in severe pain?:  No


Is NPO status required?:  No











REVA SIMMONS MD              2020 12:32

## 2020-06-06 NOTE — NUR
Pharmacy TPN Dosing Note



S: EARL TIDWELL is a 59 year old F Currently receiving Central Continuous TPN started 
06/04/20



B:Pertinent PMH: Pancreatitis, NPO



LABS:

Sodium:    137 

Potassium: 3.5 

Chloride:  102 

Calcium:   8.2 

Corrected Calcium: 9.00 

Magnesium: 2.0 

CO2:       28 

SCr:       0.6 

Glucose:   256 

Albumin:   3.0 

AST:       14 

ALT:       20 



TPN FORMULA:

TPN TYPE:  Central Continuous

AMINO ACIDS:         65 gm

DEXTROSE:            225 gm

LIPIDS:              20 gm

SODIUM CHLORIDE:     90 mEq

SODIUM ACETATE:      - mEq

SODIUM PHOSPHATE:    - mmol

POTASSIUM CHLORIDE:  70 mEq

POTASSIUM ACETATE:   - mEq

POTASSIUM PHOSPHATE: 13.6 mmol

MAGNESIUM:           18 mEq

CALCIUM:             10 mEq

INSULIN:             - units

MULTIPLE VITAMIN:    10 ml

TRACE ELEMENTS:      1 ml ml(s)



TPN PLAN:  Cont same TPN, labs WNL, repeat in am.





R: Continue TPN 

Will monitor electrolytes, glucose, and tolerance to TPN.



 KATT CRISOSTOMO [facility], 06/06/20 [id number]

## 2020-06-07 VITALS — DIASTOLIC BLOOD PRESSURE: 77 MMHG | SYSTOLIC BLOOD PRESSURE: 142 MMHG

## 2020-06-07 VITALS — SYSTOLIC BLOOD PRESSURE: 144 MMHG | DIASTOLIC BLOOD PRESSURE: 75 MMHG

## 2020-06-07 VITALS — SYSTOLIC BLOOD PRESSURE: 127 MMHG | DIASTOLIC BLOOD PRESSURE: 71 MMHG

## 2020-06-07 VITALS — DIASTOLIC BLOOD PRESSURE: 62 MMHG | SYSTOLIC BLOOD PRESSURE: 137 MMHG

## 2020-06-07 VITALS — DIASTOLIC BLOOD PRESSURE: 70 MMHG | SYSTOLIC BLOOD PRESSURE: 141 MMHG

## 2020-06-07 VITALS — DIASTOLIC BLOOD PRESSURE: 73 MMHG | SYSTOLIC BLOOD PRESSURE: 124 MMHG

## 2020-06-07 LAB
ANION GAP SERPL CALC-SCNC: 11 MMOL/L (ref 6–14)
BASOPHILS # BLD AUTO: 0 X10^3/UL (ref 0–0.2)
BASOPHILS NFR BLD: 0 % (ref 0–3)
BUN SERPL-MCNC: 11 MG/DL (ref 7–20)
CALCIUM SERPL-MCNC: 9.3 MG/DL (ref 8.5–10.1)
CHLORIDE SERPL-SCNC: 100 MMOL/L (ref 98–107)
CO2 SERPL-SCNC: 26 MMOL/L (ref 21–32)
CREAT SERPL-MCNC: 0.8 MG/DL (ref 0.6–1)
EOSINOPHIL NFR BLD: 0 % (ref 0–3)
EOSINOPHIL NFR BLD: 0 X10^3/UL (ref 0–0.7)
ERYTHROCYTE [DISTWIDTH] IN BLOOD BY AUTOMATED COUNT: 15.2 % (ref 11.5–14.5)
GFR SERPLBLD BASED ON 1.73 SQ M-ARVRAT: 73.7 ML/MIN
GLUCOSE SERPL-MCNC: 313 MG/DL (ref 70–99)
HCT VFR BLD CALC: 35.8 % (ref 36–47)
HGB BLD-MCNC: 11.8 G/DL (ref 12–15.5)
LYMPHOCYTES # BLD: 0.7 X10^3/UL (ref 1–4.8)
LYMPHOCYTES NFR BLD AUTO: 12 % (ref 24–48)
MAGNESIUM SERPL-MCNC: 2.1 MG/DL (ref 1.8–2.4)
MCH RBC QN AUTO: 28 PG (ref 25–35)
MCHC RBC AUTO-ENTMCNC: 33 G/DL (ref 31–37)
MCV RBC AUTO: 84 FL (ref 79–100)
MONO #: 0.5 X10^3/UL (ref 0–1.1)
MONOCYTES NFR BLD: 9 % (ref 0–9)
NEUT #: 4.5 X10^3/UL (ref 1.8–7.7)
NEUTROPHILS NFR BLD AUTO: 79 % (ref 31–73)
PHOSPHATE SERPL-MCNC: 4.5 MG/DL (ref 2.6–4.7)
PLATELET # BLD AUTO: 367 X10^3/UL (ref 140–400)
POTASSIUM SERPL-SCNC: 4.1 MMOL/L (ref 3.5–5.1)
RBC # BLD AUTO: 4.28 X10^6/UL (ref 3.5–5.4)
SODIUM SERPL-SCNC: 137 MMOL/L (ref 136–145)
WBC # BLD AUTO: 5.7 X10^3/UL (ref 4–11)

## 2020-06-07 RX ADMIN — INSULIN LISPRO SCH UNITS: 100 INJECTION, SOLUTION INTRAVENOUS; SUBCUTANEOUS at 06:57

## 2020-06-07 RX ADMIN — INSULIN LISPRO SCH UNITS: 100 INJECTION, SOLUTION INTRAVENOUS; SUBCUTANEOUS at 23:50

## 2020-06-07 RX ADMIN — SODIUM CHLORIDE, SODIUM LACTATE, POTASSIUM CHLORIDE, AND CALCIUM CHLORIDE SCH MLS/HR: .6; .31; .03; .02 INJECTION, SOLUTION INTRAVENOUS at 22:50

## 2020-06-07 RX ADMIN — INSULIN LISPRO SCH UNITS: 100 INJECTION, SOLUTION INTRAVENOUS; SUBCUTANEOUS at 12:34

## 2020-06-07 RX ADMIN — INSULIN LISPRO SCH UNITS: 100 INJECTION, SOLUTION INTRAVENOUS; SUBCUTANEOUS at 18:53

## 2020-06-07 RX ADMIN — INSULIN LISPRO SCH UNITS: 100 INJECTION, SOLUTION INTRAVENOUS; SUBCUTANEOUS at 00:00

## 2020-06-07 RX ADMIN — INSULIN GLARGINE SCH UNIT: 100 INJECTION, SOLUTION SUBCUTANEOUS at 22:41

## 2020-06-07 RX ADMIN — POTASSIUM CHLORIDE SCH MEQ: 1500 TABLET, EXTENDED RELEASE ORAL at 08:31

## 2020-06-07 RX ADMIN — INSULIN GLARGINE SCH UNIT: 100 INJECTION, SOLUTION SUBCUTANEOUS at 08:39

## 2020-06-07 RX ADMIN — Medication PRN EACH: at 11:32

## 2020-06-07 RX ADMIN — LEUCOVORIN CALCIUM SCH MG: 5 TABLET ORAL at 22:12

## 2020-06-07 RX ADMIN — LEUCOVORIN CALCIUM SCH MG: 5 TABLET ORAL at 14:08

## 2020-06-07 RX ADMIN — PSYLLIUM HUSK SCH PKT: 3.4 POWDER ORAL at 08:28

## 2020-06-07 RX ADMIN — METHYLPREDNISOLONE SODIUM SUCCINATE SCH MLS/HR: 500 INJECTION, POWDER, FOR SOLUTION INTRAMUSCULAR; INTRAVENOUS at 10:14

## 2020-06-07 RX ADMIN — LEUCOVORIN CALCIUM SCH MG: 5 TABLET ORAL at 06:38

## 2020-06-07 RX ADMIN — FAMOTIDINE SCH MG: 10 INJECTION, SOLUTION INTRAVENOUS at 22:11

## 2020-06-07 RX ADMIN — INSULIN LISPRO SCH UNITS: 100 INJECTION, SOLUTION INTRAVENOUS; SUBCUTANEOUS at 00:05

## 2020-06-07 RX ADMIN — ENOXAPARIN SODIUM SCH MG: 40 INJECTION SUBCUTANEOUS at 14:10

## 2020-06-07 RX ADMIN — POLYETHYLENE GLYCOL 3350 SCH GM: 17 POWDER, FOR SOLUTION ORAL at 08:29

## 2020-06-07 RX ADMIN — SODIUM CHLORIDE, SODIUM LACTATE, POTASSIUM CHLORIDE, AND CALCIUM CHLORIDE SCH MLS/HR: .6; .31; .03; .02 INJECTION, SOLUTION INTRAVENOUS at 09:30

## 2020-06-07 NOTE — PDOC
PROGRESS NOTES


Subjective


Subjective


Feeling better, pain improved





Objective


Objective





Vital Signs








  Date Time  Temp Pulse Resp B/P (MAP) Pulse Ox O2 Delivery O2 Flow Rate FiO2


 


20 15:00 97.8 85 17 141/70 (93) 94 Room Air  





 97.8       


 


20 03:00       2.0 














Intake and Output 


 


 20





 06:59


 


Intake Total 1610 ml


 


Balance 1610 ml


 


 


 


Intake Oral 1610 ml


 


# Voids 7











Physical Exam


Abdomen:  Soft, No tenderness


Heart:  Regular rate


General:  Alert


Lungs:  Clear to auscultation


Psych/Mental Status:  Mental status NL





Assessment


Assessment


Problems


Medical Problems:


(1) Dehydration


Status: Acute  





(2) Hyperglycemia


Status: Acute  





(3) Pancreatitis, acute


Status: Acute  











Plan


Plan of Care


Some clinical improvement;  no surgical recs





Comment


Review of Relevant


I have reviewed the following items elizabeth (where applicable) has been applied.


Labs





Laboratory Tests








Test


 20


06:40 20


07:29 20


11:05 20


16:17


 


White Blood Count


 2.0 x10^3/uL


(4.0-11.0) 


 


 





 


Red Blood Count


 3.80 x10^6/uL


(3.50-5.40) 


 


 





 


Hemoglobin


 10.5 g/dL


(12.0-15.5) 


 


 





 


Hematocrit


 31.8 %


(36.0-47.0) 


 


 





 


Mean Corpuscular Volume 84 fL ()    


 


Mean Corpuscular Hemoglobin 28 pg (25-35)    


 


Mean Corpuscular Hemoglobin


Concent 33 g/dL


(31-37) 


 


 





 


Red Cell Distribution Width


 15.4 %


(11.5-14.5) 


 


 





 


Platelet Count


 273 x10^3/uL


(140-400) 


 


 





 


Neutrophils (%) (Auto) 37 % (31-73)    


 


Lymphocytes (%) (Auto) 37 % (24-48)    


 


Monocytes (%) (Auto) 22 % (0-9)    


 


Eosinophils (%) (Auto) 3 % (0-3)    


 


Basophils (%) (Auto) 1 % (0-3)    


 


Neutrophils # (Auto)


 0.7 x10^3/uL


(1.8-7.7) 


 


 





 


Lymphocytes # (Auto)


 0.7 x10^3/uL


(1.0-4.8) 


 


 





 


Monocytes # (Auto)


 0.4 x10^3/uL


(0.0-1.1) 


 


 





 


Eosinophils # (Auto)


 0.1 x10^3/uL


(0.0-0.7) 


 


 





 


Basophils # (Auto)


 0.0 x10^3/uL


(0.0-0.2) 


 


 





 


Sodium Level


 137 mmol/L


(136-145) 


 


 





 


Potassium Level


 3.5 mmol/L


(3.5-5.1) 


 


 





 


Chloride Level


 102 mmol/L


() 


 


 





 


Carbon Dioxide Level


 28 mmol/L


(21-32) 


 


 





 


Anion Gap 7 (6-14)    


 


Blood Urea Nitrogen 7 mg/dL (7-20)    


 


Creatinine


 0.6 mg/dL


(0.6-1.0) 


 


 





 


Estimated GFR


(Cockcroft-Gault) 102.7 


 


 


 





 


Glucose Level


 259 mg/dL


(70-99) 


 


 





 


Calcium Level


 8.2 mg/dL


(8.5-10.1) 


 


 





 


Phosphorus Level


 3.6 mg/dL


(2.6-4.7) 


 


 





 


Magnesium Level


 2.0 mg/dL


(1.8-2.4) 


 


 





 


Glucose (Fingerstick)


 


 244 mg/dL


(70-99) 256 mg/dL


(70-99) 215 mg/dL


(70-99)


 


Test


 20


20:45 20


22:21 20


05:34 20


07:00


 


Glucose (Fingerstick)


 370 mg/dL


(70-99) 383 mg/dL


(70-99) 322 mg/dL


(70-99) 





 


White Blood Count


 


 


 


 5.7 x10^3/uL


(4.0-11.0)


 


Red Blood Count


 


 


 


 4.28 x10^6/uL


(3.50-5.40)


 


Hemoglobin


 


 


 


 11.8 g/dL


(12.0-15.5)


 


Hematocrit


 


 


 


 35.8 %


(36.0-47.0)


 


Mean Corpuscular Volume    84 fL () 


 


Mean Corpuscular Hemoglobin    28 pg (25-35) 


 


Mean Corpuscular Hemoglobin


Concent 


 


 


 33 g/dL


(31-37)


 


Red Cell Distribution Width


 


 


 


 15.2 %


(11.5-14.5)


 


Platelet Count


 


 


 


 367 x10^3/uL


(140-400)


 


Neutrophils (%) (Auto)    79 % (31-73) 


 


Lymphocytes (%) (Auto)    12 % (24-48) 


 


Monocytes (%) (Auto)    9 % (0-9) 


 


Eosinophils (%) (Auto)    0 % (0-3) 


 


Basophils (%) (Auto)    0 % (0-3) 


 


Neutrophils # (Auto)


 


 


 


 4.5 x10^3/uL


(1.8-7.7)


 


Lymphocytes # (Auto)


 


 


 


 0.7 x10^3/uL


(1.0-4.8)


 


Monocytes # (Auto)


 


 


 


 0.5 x10^3/uL


(0.0-1.1)


 


Eosinophils # (Auto)


 


 


 


 0.0 x10^3/uL


(0.0-0.7)


 


Basophils # (Auto)


 


 


 


 0.0 x10^3/uL


(0.0-0.2)


 


Sodium Level


 


 


 


 137 mmol/L


(136-145)


 


Potassium Level


 


 


 


 4.1 mmol/L


(3.5-5.1)


 


Chloride Level


 


 


 


 100 mmol/L


()


 


Carbon Dioxide Level


 


 


 


 26 mmol/L


(21-32)


 


Anion Gap    11 (6-14) 


 


Blood Urea Nitrogen


 


 


 


 11 mg/dL


(7-20)


 


Creatinine


 


 


 


 0.8 mg/dL


(0.6-1.0)


 


Estimated GFR


(Cockcroft-Gault) 


 


 


 73.7 





 


Glucose Level


 


 


 


 313 mg/dL


(70-99)


 


Calcium Level


 


 


 


 9.3 mg/dL


(8.5-10.1)


 


Phosphorus Level


 


 


 


 4.5 mg/dL


(2.6-4.7)


 


Magnesium Level


 


 


 


 2.1 mg/dL


(1.8-2.4)


 


Test


 20


11:04 20


18:31 20


19:41 





 


Glucose (Fingerstick)


 245 mg/dL


(70-99) 401 mg/dL


(70-99) 339 mg/dL


(70-99) 











Laboratory Tests








Test


 20


20:45 20


22:21 20


05:34 20


07:00


 


Glucose (Fingerstick)


 370 mg/dL


(70-99) 383 mg/dL


(70-99) 322 mg/dL


(70-99) 





 


White Blood Count


 


 


 


 5.7 x10^3/uL


(4.0-11.0)


 


Red Blood Count


 


 


 


 4.28 x10^6/uL


(3.50-5.40)


 


Hemoglobin


 


 


 


 11.8 g/dL


(12.0-15.5)


 


Hematocrit


 


 


 


 35.8 %


(36.0-47.0)


 


Mean Corpuscular Volume    84 fL () 


 


Mean Corpuscular Hemoglobin    28 pg (25-35) 


 


Mean Corpuscular Hemoglobin


Concent 


 


 


 33 g/dL


(31-37)


 


Red Cell Distribution Width


 


 


 


 15.2 %


(11.5-14.5)


 


Platelet Count


 


 


 


 367 x10^3/uL


(140-400)


 


Neutrophils (%) (Auto)    79 % (31-73) 


 


Lymphocytes (%) (Auto)    12 % (24-48) 


 


Monocytes (%) (Auto)    9 % (0-9) 


 


Eosinophils (%) (Auto)    0 % (0-3) 


 


Basophils (%) (Auto)    0 % (0-3) 


 


Neutrophils # (Auto)


 


 


 


 4.5 x10^3/uL


(1.8-7.7)


 


Lymphocytes # (Auto)


 


 


 


 0.7 x10^3/uL


(1.0-4.8)


 


Monocytes # (Auto)


 


 


 


 0.5 x10^3/uL


(0.0-1.1)


 


Eosinophils # (Auto)


 


 


 


 0.0 x10^3/uL


(0.0-0.7)


 


Basophils # (Auto)


 


 


 


 0.0 x10^3/uL


(0.0-0.2)


 


Sodium Level


 


 


 


 137 mmol/L


(136-145)


 


Potassium Level


 


 


 


 4.1 mmol/L


(3.5-5.1)


 


Chloride Level


 


 


 


 100 mmol/L


()


 


Carbon Dioxide Level


 


 


 


 26 mmol/L


(21-32)


 


Anion Gap    11 (6-14) 


 


Blood Urea Nitrogen


 


 


 


 11 mg/dL


(7-20)


 


Creatinine


 


 


 


 0.8 mg/dL


(0.6-1.0)


 


Estimated GFR


(Cockcroft-Gault) 


 


 


 73.7 





 


Glucose Level


 


 


 


 313 mg/dL


(70-99)


 


Calcium Level


 


 


 


 9.3 mg/dL


(8.5-10.1)


 


Phosphorus Level


 


 


 


 4.5 mg/dL


(2.6-4.7)


 


Magnesium Level


 


 


 


 2.1 mg/dL


(1.8-2.4)


 


Test


 20


11:04 20


18:31 20


19:41 





 


Glucose (Fingerstick)


 245 mg/dL


(70-99) 401 mg/dL


(70-99) 339 mg/dL


(70-99) 











Medications





Current Medications


Iohexol (Omnipaque 300 Mg/ml) 75 ml 1X  ONCE IV  Last administered on 20at 

11:30;  Start 20 at 11:30;  Stop 20 at 11:31;  Status DC


Iohexol (Omnipaque 240 Mg/ml) 50 ml 1X  ONCE PO  Last administered on 20at 

11:30;  Start 20 at 11:30;  Stop 20 at 11:31;  Status DC


Info (CONTRAST GIVEN -- Rx MONITORING) 1 each PRN DAILY  PRN MC SEE COMMENTS;  

Start 20 at 11:30;  Stop 20 at 11:29;  Status DC


Sodium Chloride 1,000 ml @  0 mls/hr 1X  ONCE IV  Last administered on 20at

12:27;  Start 20 at 12:15;  Stop 20 at 12:16;  Status DC


Morphine Sulfate (Morphine Sulfate) 5 mg 1X  ONCE IV  Last administered on 

20at 12:45;  Start 20 at 12:30;  Stop 20 at 12:33;  Status DC


Morphine Sulfate (Morphine Sulfate) 5 mg 1X  ONCE IV ;  Start 20 at 13:45; 

Stop 20 at 13:46;  Status DC


Morphine Sulfate (Morphine Sulfate) 4 mg PRN Q2HR  PRN IV MODERATE TO SEVERE 

PAIN Last administered on 20at 16:18;  Start 20 at 14:45;  Stop 20

at 10:23;  Status DC


Sodium Chloride 1,000 ml @  100 mls/hr Q10H IV  Last administered on 20at 

08:27;  Start 20 at 14:44;  Stop 20 at 10:23;  Status DC


Ondansetron HCl (Zofran) 4 mg PRN Q4HRS  PRN IV NAUSEA/VOMITING Last 

administered on 20at 14:06;  Start 20 at 14:45


Acetaminophen (Tylenol Supp) 650 mg PRN Q4HRS  PRN LA TEMP OVER 100.4F OR MILD 

PAIN;  Start 20 at 14:45


Enoxaparin Sodium (Lovenox 40mg Syringe) 40 mg Q24H SQ  Last administered on 

20at 14:13;  Start 20 at 15:00


Insulin Human Lispro (HumaLOG) 0-9 UNITS Q4HRS SQ  Last administered on 

20at 18:15;  Start 20 at 16:00;  Stop 20 at 19:32;  Status DC


Dextrose (Dextrose 50%-Water Syringe) 12.5 gm PRN Q15MIN  PRN IV SEE COMMENTS;  

Start 20 at 14:45;  Stop 20 at 13:20;  Status DC


Albuterol Sulfate (Ventolin Neb Soln) 3 mg PRN QID  PRN NEB SHORTNESS OF BREATH;

 Start 20 at 15:15


Sodium Chloride (Saline Mist Nasal) 1 néstor PRN Q1HR  PRN NS NASAL CONGESTION;  

Start 20 at 15:15


Methylprednisolone Sodium Succinate (SOLU-Medrol 40MG VIAL) 40 mg DAILY IV  Last

administered on 20at 08:21;  Start 20 at 15:15;  Stop 20 at 

13:44;  Status DC


Insulin Human Regular 100 unit/ Sodium Chloride 101 ml @ 0 mls/hr CONT  PRN IV 

SEE I/O RECORD Last administered on 20at 02:12;  Start 20 at 18:00


Fentanyl Citrate (Fentanyl 2ml Vial) 50 mcg PRN Q2HR  PRN IVP MOD TO SEVERE 

PAIN, 2ND CHOICE Last administered on 20at 16:21;  Start 20 at 11:45


Famotidine (Pepcid Vial) 20 mg QHS IVP  Last administered on 20at 22:14;  

Start 20 at 21:00;  Stop 20 at 10:21;  Status DC


Prednisone (Prednisone) 3 mg DAILY PO  Last administered on 20at 10:29;  

Start 20 at 09:00


Potassium Chloride/Water 100 ml @  100 mls/hr Q1H IV  Last administered on 

20at 15:22;  Start 20 at 11:00;  Stop 20 at 14:59;  Status DC


Potassium Chloride (Klor-Con) 40 meq 1X  ONCE PO  Last administered on 20at

11:01;  Start 20 at 10:15;  Stop 20 at 10:22;  Status DC


Tramadol HCl (Ultram) 50 mg PRN Q6HRS  PRN PO MODERATE PAIN 4-6 Last 

administered on 20at 06:08;  Start 20 at 10:15


Oxycodone HCl (Roxicodone) 5 mg PRN Q6HRS  PRN PO SEVERE PAIN 7-10 Last 

administered on 20at 11:08;  Start 20 at 10:15;  Stop 20 at 

15:30;  Status DC


Magnesium Sulfate 100 ml @  25 mls/hr 1X  ONCE IV  Last administered on 

20at 11:06;  Start 20 at 11:00;  Stop 20 at 14:59;  Status DC


Magnesium Hydroxide (Milk Of Magnesia) 2,400 mg PRN DAILY  PRN PO CONSTIPATION 

2ND CHOICE;  Start 20 at 15:30


Psyllium Hydrophilic Mucilloid (Metamucil Fiber Packet) 1 pkt DAILY PO  Last 

administered on 20at 10:29;  Start 20 at 15:30


Polyethylene Glycol (miraLAX PACKET) 17 gm DAILY PO  Last administered on 

20at 10:29;  Start 20 at 15:30


Oxycodone HCl (Roxicodone) 5 mg PRN Q4HRS  PRN PO SEVERE PAIN 7-10 Last 

administered on 20at 10:30;  Start 20 at 15:30


Insulin Human Lispro (HumaLOG) 0-9 UNITS TIDWMEALHC SQ  Last administered on 

20at 17:14;  Start 20 at 19:30;  Stop 20 at 06:31;  Status DC


Famotidine (Pepcid) 20 mg QHS PO  Last administered on 20at 20:57;  Start 

20 at 21:00;  Stop 20 at 10:56;  Status DC


Iohexol (Omnipaque 300 Mg/ml) 75 ml 1X  ONCE IV  Last administered on 20at 

11:45;  Start 20 at 11:45;  Stop 20 at 11:46;  Status DC


Info (CONTRAST GIVEN -- Rx MONITORING) 1 each PRN DAILY  PRN MC SEE COMMENTS;  

Start 20 at 11:45;  Stop 6/3/20 at 11:44;  Status DC


Ringer's Solution 1,000 ml @  75 mls/hr [id number] IV  Last administered on 

20at 01:53;  Start 20 at 09:30


Potassium Chloride/Water 100 ml @  100 mls/hr Q1H IV  Last administered on 

20at 21:27;  Start 20 at 10:00;  Stop 20 at 21:59;  Status DC


Famotidine (Pepcid Vial) 20 mg QHS IVP  Last administered on 20at 22:12;  

Start 20 at 21:00


Bisacodyl (Dulcolax Supp) 10 mg PRN DAILY  PRN LA CONSTIPATION;  Start 20 at

11:00


Leucovorin Calcium (Wellcovorin) 5 mg DAILY PO  Last administered on 6/3/20at 

09:53;  Start 6/3/20 at 10:00;  Stop 20 at 09:24;  Status DC


Info (Tpn Per Pharmacy) 1 each PRN DAILY  PRN MC SEE COMMENTS Last administered 

on 20at 11:32;  Start 6/3/20 at 12:00


Potassium Chloride/Water 100 ml @  100 mls/hr Q1H IV  Last administered on 

20at 01:53;  Start 6/3/20 at 20:00;  Stop 6/3/20 at 23:59;  Status DC


Leucovorin Calcium (Wellcovorin) 15 mg Q8HRS PO  Last administered on 20at 

14:08;  Start 20 at 10:00


Lidocaine HCl (Buffered Lidocaine 1%) 3 ml 1X  ONCE INJ ;  Start 20 at 

12:00;  Stop 20 at 12:01;  Status DC


Lidocaine HCl (Buffered Lidocaine 1%) 3 ml STK-MED ONCE .ROUTE ;  Start 20 

at 11:58;  Stop 20 at 11:59;  Status DC


Sodium Chloride 90 meq/Potassium Chloride 50 meq/ Potassium Phosphate 13.6 

mmol/Magnesium Sulfate 10 meq/ Calcium Gluconate 10 meq/ Multivitamins 10 

ml/Chromium/ Copper/Manganese/ Seleni/Zn 1 ml/ Total Parenteral Nutrition/Amino 

Acids/Dextrose/ Fat Emulsion Intravenous 1,512 ml @  63 mls/hr TPN  CONT IV  

Last administered on 20at 22:12;  Start 20 at 22:00;  Stop 20 at 

21:59;  Status DC


Magnesium Sulfate/ Dextrose 100 ml @  100 mls/hr 1X  ONCE IV  Last administered 

on 20at 16:16;  Start 20 at 16:00;  Stop 20 at 16:59;  Status DC


Insulin Human Lispro (HumaLOG) 0-5 UNITS TIDWMEALS SQ ;  Start 20 at 08:00; 

Status Cancel


Dextrose (Dextrose 50%-Water Syringe) 12.5 gm PRN Q15MIN  PRN IV SEE COMMENTS;  

Start 20 at 07:45;  Status Cancel


Insulin Human Lispro (HumaLOG) 0-7 UNITS TIDWMEALS SQ ;  Start 20 at 08:00; 

Stop 20 at 08:00;  Status DC


Dextrose (Dextrose 50%-Water Syringe) 12.5 gm PRN Q15MIN  PRN IV SEE COMMENTS;  

Start 20 at 08:00;  Status Cancel


Insulin Human Lispro (HumaLOG) 0-9 UNITS TIDWMEALS SQ  Last administered on 

20at 17:21;  Start 20 at 08:00;  Stop 20 at 23:09;  Status DC


Dextrose (Dextrose 50%-Water Syringe) 12.5 gm PRN Q15MIN  PRN IV SEE COMMENTS;  

Start 20 at 08:00;  Stop 20 at 13:17;  Status DC


Potassium Chloride (Klor-Con) 40 meq 1X  ONCE PO  Last administered on 20at 

11:03;  Start 20 at 10:45;  Stop 20 at 10:46;  Status DC


Potassium Chloride (Klor-Con) 20 meq DAILYWBKFT PO  Last administered on 

20at 08:31;  Start 20 at 08:00


Magnesium Sulfate 50 ml @ 25 mls/hr 1X  ONCE IV  Last administered on 20at 

11:04;  Start 20 at 10:45;  Stop 20 at 12:44;  Status DC


Dextrose (Dextrose 50%-Water Syringe) 12.5 gm PRN Q15MIN  PRN IV SEE COMMENTS;  

Start 20 at 13:30


Sodium Chloride 90 meq/Potassium Chloride 70 meq/ Potassium Phosphate 13.6 

mmol/Magnesium Sulfate 18 meq/ Calcium Gluconate 10 meq/ Multivitamins 10 

ml/Chromium/ Copper/Manganese/ Seleni/Zn 1 ml/ Total Parenteral Nutrition/Amino 

Acids/Dextrose/ Fat Emulsion Intravenous 1,512 ml @  63 mls/hr TPN  CONT IV  

Last administered on 20at 22:02;  Start 20 at 22:00;  Stop 20 at 

21:59;  Status DC


Methylprednisolone Sodium Succinate (SOLU-Medrol 125MG VIAL) 1,000 mg DAILY08 IV

;  Start 20 at 08:00;  Stop 20 at 07:59;  Status UNV


Methylprednisolone Sodium Succinate 1000 mg/Sodium Chloride 100 ml @  100 mls/hr

Q24H IV  Last administered on 20at 10:14;  Start 20 at 08:00;  Stop 

20 at 07:59


Sodium Chloride 90 meq/Potassium Chloride 70 meq/ Potassium Phosphate 13.6 

mmol/Magnesium Sulfate 18 meq/ Calcium Gluconate 10 meq/ Multivitamins 10 

ml/Chromium/ Copper/Manganese/ Seleni/Zn 1 ml/ Total Parenteral Nutrition/Amino 

Acids/Dextrose/ Fat Emulsion Intravenous 1,512 ml @  63 mls/hr TPN  CONT IV  

Last administered on 20at 22:12;  Start 20 at 22:00;  Stop 20 at 

21:59


Insulin Glargine (Lantus Syringe) 15 unit QHS SQ  Last administered on 20at 

00:01;  Start 20 at 23:30;  Stop 20 at 07:33;  Status DC


Insulin Human Lispro (HumaLOG) 0-9 UNITS Q6HRS SQ  Last administered on 20at

18:53;  Start 20 at 23:30


Dextrose (Dextrose 50%-Water Syringe) 12.5 gm PRN Q15MIN  PRN IV SEE COMMENTS;  

Start 20 at 23:15;  Status Cancel


Insulin Glargine (Lantus Syringe) 10 unit Q12H SQ  Last administered on 20at

08:39;  Start 20 at 09:00


Sodium Chloride 90 meq/Potassium Chloride 70 meq/ Potassium Phosphate 13.6 

mmol/Magnesium Sulfate 18 meq/ Calcium Gluconate 10 meq/ Multivitamins 10 

ml/Chromium/ Copper/Manganese/ Seleni/Zn 1 ml/ Total Parenteral Nutrition/Amino 

Acids/Dextrose/ Fat Emulsion Intravenous 1,512 ml @  63 mls/hr TPN  CONT IV ;  

Start 20 at 22:00;  Stop 20 at 21:59


Insulin Human Lispro (HumaLOG) 3 units 1X  ONCE SQ  Last administered on 

20at 18:54;  Start 20 at 18:45;  Stop 20 at 18:46;  Status DC





Active Scripts


Active


Reported


Zyrtec (Cetirizine Hcl) 10 Mg Capsule 10 Mg PO DAILY


Methotrexate (Methotrexate Sodium) 2.5 Mg Tablet 8 Tab PO WEEKLY


Protonix  ** (Pantoprazole Sodium) 40 Mg Tablet.dr 40 Mg PO DAILYAC


Folic Acid 0.8 Mg Capsule 1 Cap PO DAILY 30 Days


Ferrous Sulfate 325 Mg Tablet 1 Tab PO DAILY


Vitals/I & O





Vital Sign - Last 24 Hours








 20





 20:00 23:00 03:00 07:00


 


Temp  97.9 97.6 98.0





  97.9 97.6 98.0


 


Pulse  69 74 76


 


Resp  18 18 17


 


B/P (MAP)  161/89 (113) 137/62 (87) 124/73 (90)


 


Pulse Ox  95 94 94


 


O2 Delivery Room Air Room Air Nasal Cannula Room Air


 


O2 Flow Rate   2.0 


 


    





    





 20 





 08:00 11:00 15:00 


 


Temp  97.9 97.8 





  97.9 97.8 


 


Pulse  75 85 


 


Resp  17 17 


 


B/P (MAP)  127/71 (89) 141/70 (93) 


 


Pulse Ox  95 94 


 


O2 Delivery Room Air Room Air Room Air 














Intake and Output   


 


 20





 14:59 22:59 06:59


 


Intake Total 1030 ml 520 ml 60 ml


 


Balance 1030 ml 520 ml 60 ml











Nutrition Consultation


Dietary Evaluation:


Recommendations by RD:  Dietary education by RD, Increase Calorie Intake, 

PPN/TPN


Comments:  


REC TPN per followin g dextrose, 65 g AA, 20 g lipids





REC advance diet as tolerated, goal diet gluten free/ADA (A1c 13.6)  


w/protein supplements per pt choice


Expected Outcomes/Goals:  


TPN to meet 65% - 7% % est needs while NPO





diet advancement





Malnutrition Findings:


Food and Nutrition Intake (Mod:  <75% est energy req 7days


Weight Status:  Appropriate





Hemodynamically unstable?:  No


Is patient in severe pain?:  No


Is NPO status required?:  No











REVA SIMMONS MD              2020 19:55

## 2020-06-07 NOTE — PDOC
PROGRESS NOTES


Chief Complaint


Chief Complaint


impression 








Acute pancreatitis - likely 2/2 hyper-triglyceridemia.  IgG4 RD less likely 

given level of 72 mg/DL and chronic immunosuppression on methotrexate and p

rednisone.  Dr. Manning talked to [facility] rheumatology concern that MTX could play a

role as well.  Hold dosing for leukopenia


 6/2 ct Progression of findings of acute pancreatitis. Pancreatic enhancement  

pattern is slightly heterogeneous but no definable nonenhancing component to 

suggest liquefactive necrosis at this time. In addition, there is been 

substantial increase in left lateral peripancreatic fluid which extends 


laterally to the left in the region of the lateral conal and renal fascia,and 

dissects distally into the left pelvis. This could be infected fluid, but does 

not appear to be an encapsulated collection.


Duodenal and jejunal intraluminal lipomas


Hypertriglyceridemia - will need outpatient Endocrinology referral for 

cholesterol metabolism treatment, I have suggested prescription 2g TID fish oil 

(Vascepa or Lovaza) in the meantime once pancreatitis resolves


Hyponatremia


DM2 with Hyperglycemia - A1c 13.6. Should continue on insulin therapy until A1c 

< 8, then consider PO options. Would probably have to avoid GLP1 therapy given 

her pancreatitis.  Would defer to an endocrinologist for this.


Celiac disease 


Adult onset stills disease


Still's disease w/ h/o methotrexate and prednisone use, leukopenia improved with

high-dose steroids and leucovorin


Anemia


Asthma


NATO on CPAP


GERD 


Leukopenia -likely related to chronic immunosuppression will hold MTX therapy 

for now and change to a prednisone taper on discharge.


Hyperglycemia secondary to high-dose steroids











FEN -patient on TPN and due to high-dose steroids requiring insulin for 

hyperglycemia


PPX - lovenox


FULL CODE


DIspo - inpatient for pancreatitis recovery


TPN until pain/nausea resolve with worsening pancreatitis. 


heme consult noted and appreciated


Advance diet as tolerated





History of Present Illness


History of Present Illness


Ms Almanza is a 60yo F [employer]  w/ PMHx celiac disease, adult onset stills 

disease, Anemia, Asthma, NATO on CPAP, GERD who presents with severe upper 

abdominal pain, nausea, vomiting, fatigue. She states this started suddenly this

morning while she was working from home on teleconference (works as a 

on the  base). She only had oatmeal for breakfast.


She has never had anything similar to this in the past.  She generally feels unw

ell.  She is not had any fever, diarrhea, chest pain, shortness of breath, 

dysuria, hematuria, blood in her stools.  She denies alcohol abuse.  Pain does 

not move anywhere.  Nothing seems to make it better or worse.  She has not tried

anything at home.  He does not drink alcohol, and is status post 

cholecystectomy.  No calcium disorders.


She takes 20mg methotrexate weekly on  and is on 3 mg of chronic 

prednisone for her stills disease.  She was diagnosed with celiac disease 

2020 and has just recently started on a gluten-free diet.  No recent 

sick contacts that she can recall.


Labs significant for lipase 67354, glucose 477, , bilirubin 1.1, Calcium 

8.3, K 3.7, WBC 6.3, Hb 15, Platelets 247


CT abdomen pelvis shows surgically absent gallbladder and surgically absent 

uterus and describes duodenal and jejunal intraluminal lipomas. Findings of 

acute pancreatitis. No loculated collections. No biliary dilatation.


Admitted for further treatment.





: Lipase down. A1c 13.6, Triglycerides 3252. Started on insulin gtt.


: Abdominal pain and glucose improved.  Having ice chips per GI.  Afebrile. 

No CP or SOB. D/w [facility] rheumatology, to hold MTX therapy on  given her 

leukopenia and pancreatitis.


: Overnight afebrile.  Labs improved. Still with left-sided abdominal pain. 

WBC 1.8.  She notes she had not previously had a formal diagnosis of diabetes.  

No CP or SOB


: Pain improved, advancing diet per GI lipase 328.  WBC 2, K2.6, MG 1.4


: Afebrile overnight.  Still with abdominal pain, no CP or SOB


: Patient continues to have discomfort especially when she has a diet no 

fever or chills were reported nevertheless she says that she woke up drenched in

sweat in the middle of night, no other complaints were reported overnight, plan 

of care discussed in detail


: Patient will continue to have bowel rest in light of her CT from yesterday.

No new complaints. 


: Recommendations from Oncology greatly appreciated, continues to have pain, 

no other new complaints, she is tolerating some clear liquids continues to be on

TPN


: Laboratory data reviewed with the patient and reassurance provided.  White 

blood cell count noted now on normal levels.  Her pain seems to have improved 

today as well and is requesting a little bit more solid food.  We will certainly

try this in order to hopefully discharge in the next 48 to 72 hours once she 

finishes high-dose steroids recommended by our oncology consultant.  We will try

to also touch base with Dr. Morris her oncologist to update her in keep her also

informed





Vitals


Vitals





Vital Signs








  Date Time  Temp Pulse Resp B/P (MAP) Pulse Ox O2 Delivery O2 Flow Rate FiO2


 


20 08:00      Room Air  


 


20 07:00 98.0 76 17 124/73 (90) 94   





 98.0       


 


20 03:00       2.0 











Physical Exam


Physical Exam


GENERAL:  Propped up in bed, alert, relaxed 


HEENT:  Oral cavity pink, no thrush/lesions seen 


NECK:  Supple. 


LUNGS:  Clear, nonlabored 


HEART:  S1, S2 regular.


ABDOMEN:  Nondistended, soft, nontender, no rebound, BS present   


EXTREMITIES:  No edema, cyanosis.


SKIN:  warm to touch. No signs of rash 


NEUROLOGIC: Alert, oriented x 3,  No focal neurologic deficit.


RUE-PICC () clean


General:  Alert, Oriented X3, Cooperative


Heart:  Regular rate


Lungs:  Clear


Abdomen:  Soft (minimally tender with palpation)


Extremities:  No clubbing, No cyanosis


Skin:  No rashes





Labs


LABS





Laboratory Tests








Test


 20


16:17 20


20:45 20


22:21 20


05:34


 


Glucose (Fingerstick)


 215 mg/dL


(70-99) 370 mg/dL


(70-99) 383 mg/dL


(70-99) 322 mg/dL


(70-99)


 


Test


 20


07:00 20


11:04 


 





 


White Blood Count


 5.7 x10^3/uL


(4.0-11.0) 


 


 





 


Red Blood Count


 4.28 x10^6/uL


(3.50-5.40) 


 


 





 


Hemoglobin


 11.8 g/dL


(12.0-15.5) 


 


 





 


Hematocrit


 35.8 %


(36.0-47.0) 


 


 





 


Mean Corpuscular Volume 84 fL ()    


 


Mean Corpuscular Hemoglobin 28 pg (25-35)    


 


Mean Corpuscular Hemoglobin


Concent 33 g/dL


(31-37) 


 


 





 


Red Cell Distribution Width


 15.2 %


(11.5-14.5) 


 


 





 


Platelet Count


 367 x10^3/uL


(140-400) 


 


 





 


Neutrophils (%) (Auto) 79 % (31-73)    


 


Lymphocytes (%) (Auto) 12 % (24-48)    


 


Monocytes (%) (Auto) 9 % (0-9)    


 


Eosinophils (%) (Auto) 0 % (0-3)    


 


Basophils (%) (Auto) 0 % (0-3)    


 


Neutrophils # (Auto)


 4.5 x10^3/uL


(1.8-7.7) 


 


 





 


Lymphocytes # (Auto)


 0.7 x10^3/uL


(1.0-4.8) 


 


 





 


Monocytes # (Auto)


 0.5 x10^3/uL


(0.0-1.1) 


 


 





 


Eosinophils # (Auto)


 0.0 x10^3/uL


(0.0-0.7) 


 


 





 


Basophils # (Auto)


 0.0 x10^3/uL


(0.0-0.2) 


 


 





 


Sodium Level


 137 mmol/L


(136-145) 


 


 





 


Potassium Level


 4.1 mmol/L


(3.5-5.1) 


 


 





 


Chloride Level


 100 mmol/L


() 


 


 





 


Carbon Dioxide Level


 26 mmol/L


(21-32) 


 


 





 


Anion Gap 11 (6-14)    


 


Blood Urea Nitrogen


 11 mg/dL


(7-20) 


 


 





 


Creatinine


 0.8 mg/dL


(0.6-1.0) 


 


 





 


Estimated GFR


(Cockcroft-Gault) 73.7 


 


 


 





 


Glucose Level


 313 mg/dL


(70-99) 


 


 





 


Calcium Level


 9.3 mg/dL


(8.5-10.1) 


 


 





 


Phosphorus Level


 4.5 mg/dL


(2.6-4.7) 


 


 





 


Magnesium Level


 2.1 mg/dL


(1.8-2.4) 


 


 





 


Glucose (Fingerstick)


 


 245 mg/dL


(70-99) 


 














Review of Systems


Review of Systems


Pertinent as per HPI otherwise 14 point review of system is negative





Assessment and Plan


Assessmemt and Plan


Problems


Medical Problems:


(1) Dehydration


Status: Acute  





(2) Hyperglycemia


Status: Acute  





(3) Pancreatitis, acute


Status: Acute  











Comment


Review of Relevant


I have reviewed the following items elizabeth (where applicable) has been applied.


Labs





Laboratory Tests








Test


 20


11:37 20


14:45 20


15:10 20


16:00


 


Glucose (Fingerstick)


 113 mg/dL


(70-99) 


 


 





 


White Blood Count


 


 1.5 x10^3/uL


(4.0-11.0) 


 





 


Red Blood Count


 


 4.31 x10^6/uL


(3.50-5.40) 


 





 


Hemoglobin


 


 12.3 g/dL


(12.0-15.5) 


 





 


Hematocrit


 


 38.0 %


(36.0-47.0) 


 





 


Mean Corpuscular Volume  88 fL ()   


 


Mean Corpuscular Hemoglobin  29 pg (25-35)   


 


Mean Corpuscular Hemoglobin


Concent 


 32 g/dL


(31-37) 


 





 


Red Cell Distribution Width


 


 16.3 %


(11.5-14.5) 


 





 


Platelet Count


 


 265 x10^3/uL


(140-400) 


 





 


Neutrophils (%) (Auto)  45 % (31-73)   


 


Lymphocytes (%) (Auto)  29 % (24-48)   


 


Monocytes (%) (Auto)  24 % (0-9)   


 


Eosinophils (%) (Auto)  1 % (0-3)   


 


Basophils (%) (Auto)  1 % (0-3)   


 


Neutrophils # (Auto)


 


 0.7 x10^3/uL


(1.8-7.7) 


 





 


Lymphocytes # (Auto)


 


 0.4 x10^3/uL


(1.0-4.8) 


 





 


Monocytes # (Auto)


 


 0.4 x10^3/uL


(0.0-1.1) 


 





 


Eosinophils # (Auto)


 


 0.0 x10^3/uL


(0.0-0.7) 


 





 


Basophils # (Auto)


 


 0.0 x10^3/uL


(0.0-0.2) 


 





 


Coronavirus (COVID-19)(PCR)


 


 


 Not detected


(NOT DETECT.) 





 


Sodium Level


 


 


 


 138 mmol/L


(136-145)


 


Potassium Level


 


 


 


 3.7 mmol/L


(3.5-5.1)


 


Chloride Level


 


 


 


 100 mmol/L


()


 


Carbon Dioxide Level


 


 


 


 27 mmol/L


(21-32)


 


Anion Gap    11 (6-14) 


 


Blood Urea Nitrogen    7 mg/dL (7-20) 


 


Creatinine


 


 


 


 0.7 mg/dL


(0.6-1.0)


 


Estimated GFR


(Cockcroft-Gault) 


 


 


 85.9 





 


BUN/Creatinine Ratio    10 (6-20) 


 


Glucose Level


 


 


 


 266 mg/dL


(70-99)


 


Calcium Level


 


 


 


 8.5 mg/dL


(8.5-10.1)


 


Total Bilirubin


 


 


 


 0.4 mg/dL


(0.2-1.0)


 


Aspartate Amino Transf


(AST/SGOT) 


 


 


 17 U/L (15-37) 





 


Alanine Aminotransferase


(ALT/SGPT) 


 


 


 23 U/L (14-59) 





 


Alkaline Phosphatase


 


 


 


 104 U/L


()


 


Total Protein


 


 


 


 7.7 g/dL


(6.4-8.2)


 


Albumin


 


 


 


 3.0 g/dL


(3.4-5.0)


 


Albumin/Globulin Ratio    0.6 (1.0-1.7) 


 


Test


 20


17:09 20


06:40 20


07:29 20


11:05


 


Glucose (Fingerstick)


 240 mg/dL


(70-99) 


 244 mg/dL


(70-99) 256 mg/dL


(70-99)


 


White Blood Count


 


 2.0 x10^3/uL


(4.0-11.0) 


 





 


Red Blood Count


 


 3.80 x10^6/uL


(3.50-5.40) 


 





 


Hemoglobin


 


 10.5 g/dL


(12.0-15.5) 


 





 


Hematocrit


 


 31.8 %


(36.0-47.0) 


 





 


Mean Corpuscular Volume  84 fL ()   


 


Mean Corpuscular Hemoglobin  28 pg (25-35)   


 


Mean Corpuscular Hemoglobin


Concent 


 33 g/dL


(31-37) 


 





 


Red Cell Distribution Width


 


 15.4 %


(11.5-14.5) 


 





 


Platelet Count


 


 273 x10^3/uL


(140-400) 


 





 


Neutrophils (%) (Auto)  37 % (31-73)   


 


Lymphocytes (%) (Auto)  37 % (24-48)   


 


Monocytes (%) (Auto)  22 % (0-9)   


 


Eosinophils (%) (Auto)  3 % (0-3)   


 


Basophils (%) (Auto)  1 % (0-3)   


 


Neutrophils # (Auto)


 


 0.7 x10^3/uL


(1.8-7.7) 


 





 


Lymphocytes # (Auto)


 


 0.7 x10^3/uL


(1.0-4.8) 


 





 


Monocytes # (Auto)


 


 0.4 x10^3/uL


(0.0-1.1) 


 





 


Eosinophils # (Auto)


 


 0.1 x10^3/uL


(0.0-0.7) 


 





 


Basophils # (Auto)


 


 0.0 x10^3/uL


(0.0-0.2) 


 





 


Sodium Level


 


 137 mmol/L


(136-145) 


 





 


Potassium Level


 


 3.5 mmol/L


(3.5-5.1) 


 





 


Chloride Level


 


 102 mmol/L


() 


 





 


Carbon Dioxide Level


 


 28 mmol/L


(21-32) 


 





 


Anion Gap  7 (6-14)   


 


Blood Urea Nitrogen  7 mg/dL (7-20)   


 


Creatinine


 


 0.6 mg/dL


(0.6-1.0) 


 





 


Estimated GFR


(Cockcroft-Gault) 


 102.7 


 


 





 


Glucose Level


 


 259 mg/dL


(70-99) 


 





 


Calcium Level


 


 8.2 mg/dL


(8.5-10.1) 


 





 


Phosphorus Level


 


 3.6 mg/dL


(2.6-4.7) 


 





 


Magnesium Level


 


 2.0 mg/dL


(1.8-2.4) 


 





 


Test


 20


16:17 20


20:45 20


22:21 20


05:34


 


Glucose (Fingerstick)


 215 mg/dL


(70-99) 370 mg/dL


(70-99) 383 mg/dL


(70-99) 322 mg/dL


(70-99)


 


Test


 20


07:00 20


11:04 


 





 


White Blood Count


 5.7 x10^3/uL


(4.0-11.0) 


 


 





 


Red Blood Count


 4.28 x10^6/uL


(3.50-5.40) 


 


 





 


Hemoglobin


 11.8 g/dL


(12.0-15.5) 


 


 





 


Hematocrit


 35.8 %


(36.0-47.0) 


 


 





 


Mean Corpuscular Volume 84 fL ()    


 


Mean Corpuscular Hemoglobin 28 pg (25-35)    


 


Mean Corpuscular Hemoglobin


Concent 33 g/dL


(31-37) 


 


 





 


Red Cell Distribution Width


 15.2 %


(11.5-14.5) 


 


 





 


Platelet Count


 367 x10^3/uL


(140-400) 


 


 





 


Neutrophils (%) (Auto) 79 % (31-73)    


 


Lymphocytes (%) (Auto) 12 % (24-48)    


 


Monocytes (%) (Auto) 9 % (0-9)    


 


Eosinophils (%) (Auto) 0 % (0-3)    


 


Basophils (%) (Auto) 0 % (0-3)    


 


Neutrophils # (Auto)


 4.5 x10^3/uL


(1.8-7.7) 


 


 





 


Lymphocytes # (Auto)


 0.7 x10^3/uL


(1.0-4.8) 


 


 





 


Monocytes # (Auto)


 0.5 x10^3/uL


(0.0-1.1) 


 


 





 


Eosinophils # (Auto)


 0.0 x10^3/uL


(0.0-0.7) 


 


 





 


Basophils # (Auto)


 0.0 x10^3/uL


(0.0-0.2) 


 


 





 


Sodium Level


 137 mmol/L


(136-145) 


 


 





 


Potassium Level


 4.1 mmol/L


(3.5-5.1) 


 


 





 


Chloride Level


 100 mmol/L


() 


 


 





 


Carbon Dioxide Level


 26 mmol/L


(21-32) 


 


 





 


Anion Gap 11 (6-14)    


 


Blood Urea Nitrogen


 11 mg/dL


(7-20) 


 


 





 


Creatinine


 0.8 mg/dL


(0.6-1.0) 


 


 





 


Estimated GFR


(Cockcroft-Gault) 73.7 


 


 


 





 


Glucose Level


 313 mg/dL


(70-99) 


 


 





 


Calcium Level


 9.3 mg/dL


(8.5-10.1) 


 


 





 


Phosphorus Level


 4.5 mg/dL


(2.6-4.7) 


 


 





 


Magnesium Level


 2.1 mg/dL


(1.8-2.4) 


 


 





 


Glucose (Fingerstick)


 


 245 mg/dL


(70-99) 


 











Laboratory Tests








Test


 20


16:17 20


20:45 20


22:21 20


05:34


 


Glucose (Fingerstick)


 215 mg/dL


(70-99) 370 mg/dL


(70-99) 383 mg/dL


(70-99) 322 mg/dL


(70-99)


 


Test


 20


07:00 20


11:04 


 





 


White Blood Count


 5.7 x10^3/uL


(4.0-11.0) 


 


 





 


Red Blood Count


 4.28 x10^6/uL


(3.50-5.40) 


 


 





 


Hemoglobin


 11.8 g/dL


(12.0-15.5) 


 


 





 


Hematocrit


 35.8 %


(36.0-47.0) 


 


 





 


Mean Corpuscular Volume 84 fL ()    


 


Mean Corpuscular Hemoglobin 28 pg (25-35)    


 


Mean Corpuscular Hemoglobin


Concent 33 g/dL


(31-37) 


 


 





 


Red Cell Distribution Width


 15.2 %


(11.5-14.5) 


 


 





 


Platelet Count


 367 x10^3/uL


(140-400) 


 


 





 


Neutrophils (%) (Auto) 79 % (31-73)    


 


Lymphocytes (%) (Auto) 12 % (24-48)    


 


Monocytes (%) (Auto) 9 % (0-9)    


 


Eosinophils (%) (Auto) 0 % (0-3)    


 


Basophils (%) (Auto) 0 % (0-3)    


 


Neutrophils # (Auto)


 4.5 x10^3/uL


(1.8-7.7) 


 


 





 


Lymphocytes # (Auto)


 0.7 x10^3/uL


(1.0-4.8) 


 


 





 


Monocytes # (Auto)


 0.5 x10^3/uL


(0.0-1.1) 


 


 





 


Eosinophils # (Auto)


 0.0 x10^3/uL


(0.0-0.7) 


 


 





 


Basophils # (Auto)


 0.0 x10^3/uL


(0.0-0.2) 


 


 





 


Sodium Level


 137 mmol/L


(136-145) 


 


 





 


Potassium Level


 4.1 mmol/L


(3.5-5.1) 


 


 





 


Chloride Level


 100 mmol/L


() 


 


 





 


Carbon Dioxide Level


 26 mmol/L


(21-32) 


 


 





 


Anion Gap 11 (6-14)    


 


Blood Urea Nitrogen


 11 mg/dL


(7-20) 


 


 





 


Creatinine


 0.8 mg/dL


(0.6-1.0) 


 


 





 


Estimated GFR


(Cockcroft-Gault) 73.7 


 


 


 





 


Glucose Level


 313 mg/dL


(70-99) 


 


 





 


Calcium Level


 9.3 mg/dL


(8.5-10.1) 


 


 





 


Phosphorus Level


 4.5 mg/dL


(2.6-4.7) 


 


 





 


Magnesium Level


 2.1 mg/dL


(1.8-2.4) 


 


 





 


Glucose (Fingerstick)


 


 245 mg/dL


(70-99) 


 











Medications





Current Medications


Iohexol (Omnipaque 300 Mg/ml) 75 ml 1X  ONCE IV  Last administered on 20at 

11:30;  Start 20 at 11:30;  Stop 20 at 11:31;  Status DC


Iohexol (Omnipaque 240 Mg/ml) 50 ml 1X  ONCE PO  Last administered on 20at 

11:30;  Start 20 at 11:30;  Stop 20 at 11:31;  Status DC


Info (CONTRAST GIVEN -- Rx MONITORING) 1 each PRN DAILY  PRN MC SEE COMMENTS;  

Start 20 at 11:30;  Stop 20 at 11:29;  Status DC


Sodium Chloride 1,000 ml @  0 mls/hr 1X  ONCE IV  Last administered on 20at

12:27;  Start 20 at 12:15;  Stop 20 at 12:16;  Status DC


Morphine Sulfate (Morphine Sulfate) 5 mg 1X  ONCE IV  Last administered on 

20at 12:45;  Start 20 at 12:30;  Stop 20 at 12:33;  Status DC


Morphine Sulfate (Morphine Sulfate) 5 mg 1X  ONCE IV ;  Start 20 at 13:45; 

Stop 20 at 13:46;  Status DC


Morphine Sulfate (Morphine Sulfate) 4 mg PRN Q2HR  PRN IV MODERATE TO SEVERE 

PAIN Last administered on 20at 16:18;  Start 20 at 14:45;  Stop 20

at 10:23;  Status DC


Sodium Chloride 1,000 ml @  100 mls/hr Q10H IV  Last administered on 20at 

08:27;  Start 20 at 14:44;  Stop 20 at 10:23;  Status DC


Ondansetron HCl (Zofran) 4 mg PRN Q4HRS  PRN IV NAUSEA/VOMITING Last 

administered on 20at 14:06;  Start 20 at 14:45


Acetaminophen (Tylenol Supp) 650 mg PRN Q4HRS  PRN VA TEMP OVER 100.4F OR MILD 

PAIN;  Start 20 at 14:45


Enoxaparin Sodium (Lovenox 40mg Syringe) 40 mg Q24H SQ  Last administered on 

20at 14:13;  Start 20 at 15:00


Insulin Human Lispro (HumaLOG) 0-9 UNITS Q4HRS SQ  Last administered on 

20at 18:15;  Start 20 at 16:00;  Stop 20 at 19:32;  Status DC


Dextrose (Dextrose 50%-Water Syringe) 12.5 gm PRN Q15MIN  PRN IV SEE COMMENTS;  

Start 20 at 14:45;  Stop 20 at 13:20;  Status DC


Albuterol Sulfate (Ventolin Neb Soln) 3 mg PRN QID  PRN NEB SHORTNESS OF BREATH;

 Start 20 at 15:15


Sodium Chloride (Saline Mist Nasal) 1 néstor PRN Q1HR  PRN NS NASAL CONGESTION;  

Start 20 at 15:15


Methylprednisolone Sodium Succinate (SOLU-Medrol 40MG VIAL) 40 mg DAILY IV  Last

administered on 20at 08:21;  Start 20 at 15:15;  Stop 20 at 

13:44;  Status DC


Insulin Human Regular 100 unit/ Sodium Chloride 101 ml @ 0 mls/hr CONT  PRN IV 

SEE I/O RECORD Last administered on 20at 02:12;  Start 20 at 18:00


Fentanyl Citrate (Fentanyl 2ml Vial) 50 mcg PRN Q2HR  PRN IVP MOD TO SEVERE 

PAIN, 2ND CHOICE Last administered on 20at 16:21;  Start 20 at 11:45


Famotidine (Pepcid Vial) 20 mg QHS IVP  Last administered on 20at 22:14;  

Start 20 at 21:00;  Stop 20 at 10:21;  Status DC


Prednisone (Prednisone) 3 mg DAILY PO  Last administered on 20at 10:29;  

Start 20 at 09:00


Potassium Chloride/Water 100 ml @  100 mls/hr Q1H IV  Last administered on 

20at 15:22;  Start 20 at 11:00;  Stop 20 at 14:59;  Status DC


Potassium Chloride (Klor-Con) 40 meq 1X  ONCE PO  Last administered on 20at

11:01;  Start 20 at 10:15;  Stop 20 at 10:22;  Status DC


Tramadol HCl (Ultram) 50 mg PRN Q6HRS  PRN PO MODERATE PAIN 4-6 Last 

administered on 20at 06:08;  Start 20 at 10:15


Oxycodone HCl (Roxicodone) 5 mg PRN Q6HRS  PRN PO SEVERE PAIN 7-10 Last 

administered on 20at 11:08;  Start 20 at 10:15;  Stop 20 at 

15:30;  Status DC


Magnesium Sulfate 100 ml @  25 mls/hr 1X  ONCE IV  Last administered on 

20at 11:06;  Start 20 at 11:00;  Stop 20 at 14:59;  Status DC


Magnesium Hydroxide (Milk Of Magnesia) 2,400 mg PRN DAILY  PRN PO CONSTIPATION 

2ND CHOICE;  Start 20 at 15:30


Psyllium Hydrophilic Mucilloid (Metamucil Fiber Packet) 1 pkt DAILY PO  Last 

administered on 20at 10:29;  Start 20 at 15:30


Polyethylene Glycol (miraLAX PACKET) 17 gm DAILY PO  Last administered on 

20at 10:29;  Start 20 at 15:30


Oxycodone HCl (Roxicodone) 5 mg PRN Q4HRS  PRN PO SEVERE PAIN 7-10 Last 

administered on 20at 10:30;  Start 20 at 15:30


Insulin Human Lispro (HumaLOG) 0-9 UNITS [id number] SQ  Last administered on 

20at 17:14;  Start 20 at 19:30;  Stop 20 at 06:31;  Status DC


Famotidine (Pepcid) 20 mg QHS PO  Last administered on 20at 20:57;  Start 20 at 21:00;  Stop 20 at 10:56;  Status DC


Iohexol (Omnipaque 300 Mg/ml) 75 ml 1X  ONCE IV  Last administered on 20at 

11:45;  Start 20 at 11:45;  Stop 20 at 11:46;  Status DC


Info (CONTRAST GIVEN -- Rx MONITORING) 1 each PRN DAILY  PRN MC SEE COMMENTS;  

Start 20 at 11:45;  Stop 6/3/20 at 11:44;  Status DC


Ringer's Solution 1,000 ml @  75 mls/hr [id number] IV  Last administered on 

20at 01:53;  Start 20 at 09:30


Potassium Chloride/Water 100 ml @  100 mls/hr Q1H IV  Last administered on 

20at 21:27;  Start 20 at 10:00;  Stop 20 at 21:59;  Status DC


Famotidine (Pepcid Vial) 20 mg QHS IVP  Last administered on 20at 22:12;  

Start 20 at 21:00


Bisacodyl (Dulcolax Supp) 10 mg PRN DAILY  PRN VA CONSTIPATION;  Start 20 at

11:00


Leucovorin Calcium (Wellcovorin) 5 mg DAILY PO  Last administered on 6/3/20at 

09:53;  Start 6/3/20 at 10:00;  Stop 20 at 09:24;  Status DC


Info (Tpn Per Pharmacy) 1 each PRN DAILY  PRN [facility] SEE COMMENTS Last administered 

on 20at 11:32;  Start 6/3/20 at 12:00


Potassium Chloride/Water 100 ml @  100 mls/hr Q1H IV  Last administered on 

20at 01:53;  Start 6/3/20 at 20:00;  Stop 6/3/20 at 23:59;  Status DC


Leucovorin Calcium (Wellcovorin) 15 mg Q8HRS PO  Last administered on 20at 

06:38;  Start 20 at 10:00


Lidocaine HCl (Buffered Lidocaine 1%) 3 ml 1X  ONCE INJ ;  Start 20 at 

12:00;  Stop 20 at 12:01;  Status DC


Lidocaine HCl (Buffered Lidocaine 1%) 3 ml STK-MED ONCE .ROUTE ;  Start 20 

at 11:58;  Stop 20 at 11:59;  Status DC


Sodium Chloride 90 meq/Potassium Chloride 50 meq/ Potassium Phosphate 13.6 

mmol/Magnesium Sulfate 10 meq/ Calcium Gluconate 10 meq/ Multivitamins 10 

ml/Chromium/ Copper/Manganese/ Seleni/Zn 1 ml/ Total Parenteral Nutrition/Amino 

Acids/Dextrose/ Fat Emulsion Intravenous 1,512 ml @  63 mls/hr TPN  CONT IV  

Last administered on 20at 22:12;  Start 20 at 22:00;  Stop 20 at 

21:59;  Status DC


Magnesium Sulfate/ Dextrose 100 ml @  100 mls/hr 1X  ONCE IV  Last administered 

on 20at 16:16;  Start 20 at 16:00;  Stop 20 at 16:59;  Status DC


Insulin Human Lispro (HumaLOG) 0-5 UNITS TIDWMEALS SQ ;  Start 20 at 08:00; 

Status Cancel


Dextrose (Dextrose 50%-Water Syringe) 12.5 gm PRN Q15MIN  PRN IV SEE COMMENTS;  

Start 20 at 07:45;  Status Cancel


Insulin Human Lispro (HumaLOG) 0-7 UNITS TIDWMEALS SQ ;  Start 20 at 08:00; 

Stop 20 at 08:00;  Status DC


Dextrose (Dextrose 50%-Water Syringe) 12.5 gm PRN Q15MIN  PRN IV SEE COMMENTS;  

Start 20 at 08:00;  Status Cancel


Insulin Human Lispro (HumaLOG) 0-9 UNITS TIDWMEALS SQ  Last administered on 

20at 17:21;  Start 20 at 08:00;  Stop 20 at 23:09;  Status DC


Dextrose (Dextrose 50%-Water Syringe) 12.5 gm PRN Q15MIN  PRN IV SEE COMMENTS;  

Start 20 at 08:00;  Stop 20 at 13:17;  Status DC


Potassium Chloride (Klor-Con) 40 meq 1X  ONCE PO  Last administered on 20at 

11:03;  Start 20 at 10:45;  Stop 20 at 10:46;  Status DC


Potassium Chloride (Klor-Con) 20 meq DAILYWBKFT PO  Last administered on 

20at 08:31;  Start 20 at 08:00


Magnesium Sulfate 50 ml @ 25 mls/hr 1X  ONCE IV  Last administered on 20at 

11:04;  Start 20 at 10:45;  Stop 20 at 12:44;  Status DC


Dextrose (Dextrose 50%-Water Syringe) 12.5 gm PRN Q15MIN  PRN IV SEE COMMENTS;  

Start 20 at 13:30


Sodium Chloride 90 meq/Potassium Chloride 70 meq/ Potassium Phosphate 13.6 

mmol/Magnesium Sulfate 18 meq/ Calcium Gluconate 10 meq/ Multivitamins 10 

ml/Chromium/ Copper/Manganese/ Seleni/Zn 1 ml/ Total Parenteral Nutrition/Amino 

Acids/Dextrose/ Fat Emulsion Intravenous 1,512 ml @  63 mls/hr TPN  CONT IV  

Last administered on 20at 22:02;  Start 20 at 22:00;  Stop 20 at 

21:59;  Status DC


Methylprednisolone Sodium Succinate (SOLU-Medrol 125MG VIAL) 1,000 mg DAILY08 IV

;  Start 20 at 08:00;  Stop 20 at 07:59;  Status UNV


Methylprednisolone Sodium Succinate 1000 mg/Sodium Chloride 100 ml @  100 mls/hr

Q24H IV  Last administered on 20at 10:14;  Start 20 at 08:00;  Stop 

20 at 07:59


Sodium Chloride 90 meq/Potassium Chloride 70 meq/ Potassium Phosphate 13.6 

mmol/Magnesium Sulfate 18 meq/ Calcium Gluconate 10 meq/ Multivitamins 10 

ml/Chromium/ Copper/Manganese/ Seleni/Zn 1 ml/ Total Parenteral Nutrition/Amino 

Acids/Dextrose/ Fat Emulsion Intravenous 1,512 ml @  63 mls/hr TPN  CONT IV  

Last administered on 20at 22:12;  Start 20 at 22:00;  Stop 20 at 

21:59


Insulin Glargine (Lantus Syringe) 15 unit QHS SQ  Last administered on 20at 

00:01;  Start 20 at 23:30;  Stop 20 at 07:33;  Status DC


Insulin Human Lispro (HumaLOG) 0-9 UNITS Q6HRS SQ  Last administered on 20at

06:57;  Start 20 at 23:30


Dextrose (Dextrose 50%-Water Syringe) 12.5 gm PRN Q15MIN  PRN IV SEE COMMENTS;  

Start 20 at 23:15;  Status Cancel


Insulin Glargine (Lantus Syringe) 10 unit Q12H SQ  Last administered on 20at

08:39;  Start 20 at 09:00


Sodium Chloride 90 meq/Potassium Chloride 70 meq/ Potassium Phosphate 13.6 

mmol/Magnesium Sulfate 18 meq/ Calcium Gluconate 10 meq/ Multivitamins 10 

ml/Chromium/ Copper/Manganese/ Seleni/Zn 1 ml/ Total Parenteral Nutrition/Amino 

Acids/Dextrose/ Fat Emulsion Intravenous 1,512 ml @  63 mls/hr TPN  CONT IV ;  

Start 20 at 22:00;  Stop 20 at 21:59





Active Scripts


Active


Reported


Zyrtec (Cetirizine Hcl) 10 Mg Capsule 10 Mg PO DAILY


Methotrexate (Methotrexate Sodium) 2.5 Mg Tablet 8 Tab PO WEEKLY


Protonix  ** (Pantoprazole Sodium) 40 Mg Tablet.dr 40 Mg PO DAILYAC


Folic Acid 0.8 Mg Capsule 1 Cap PO DAILY 30 Days


Ferrous Sulfate 325 Mg Tablet 1 Tab PO DAILY


Vitals/I & O





Vital Sign - Last 24 Hours








 20





 15:11 19:00 20:00 23:00


 


Temp 97.8 97.8  97.9





 97.8 97.8  97.9


 


Pulse 77 84  69


 


Resp 16 16  18


 


B/P (MAP) 153/87 (109) 132/74 (93)  161/89 (113)


 


Pulse Ox 94 94  95


 


O2 Delivery Room Air Room Air Room Air Room Air


 


    





    





 20 





 03:00 07:00 08:00 


 


Temp 97.6 98.0  





 97.6 98.0  


 


Pulse 74 76  


 


Resp 18 17  


 


B/P (MAP) 137/62 (87) 124/73 (90)  


 


Pulse Ox 94 94  


 


O2 Delivery Nasal Cannula Room Air Room Air 


 


O2 Flow Rate 2.0   














Intake and Output   


 


 20





 15:00 23:00 07:00


 


Intake Total 1030 ml 520 ml 60 ml


 


Balance 1030 ml 520 ml 60 ml











Nutrition Consultation


Dietary Evaluation:


Recommendations by RD:  Dietary education by RD, Increase Calorie Intake, 

PPN/TPN


Comments:  


REC TPN per followin g dextrose, 65 g AA, 20 g lipids





REC advance diet as tolerated, goal diet gluten free/ADA (A1c 13.6)  


w/protein supplements per pt choice


Expected Outcomes/Goals:  


TPN to meet 65% - 7% % est needs while NPO





diet advancement





Malnutrition Findings:


Food and Nutrition Intake (Mod:  <75% est energy req 7days


Weight Status:  Appropriate





Hemodynamically unstable?:  No


Is patient in severe pain?:  No


Is NPO status required?:  No











ERWIN DUNLAP MD              2020 11:38

## 2020-06-07 NOTE — PDOC
Infectious Disease Note


Subjective


Subjective


Comfortable, denies pain


Some diarrhea


Tolerating clear liquids


Not very hungry but wants to try solids 


Denies N/V/F/C/S/SOA 


TPN





ROS


ROS


as mentioned above





Vital Sign


Vital Signs





Vital Signs








  Date Time  Temp Pulse Resp B/P (MAP) Pulse Ox O2 Delivery O2 Flow Rate FiO2


 


6/7/20 07:00 98.0 76 17 124/73 (90) 94 Room Air  





 98.0       


 


6/7/20 03:00       2.0 











Physical Exam


PHYSICAL EXAM


GENERAL:  Propped up in bed, alert, relaxed 


HEENT:  Oral cavity pink, no thrush/lesions seen 


NECK:  Supple. 


LUNGS:  Clear, nonlabored 


HEART:  S1, S2 regular.


ABDOMEN:  Nondistended, soft, nontender, no rebound, BS present   


EXTREMITIES:  No edema, cyanosis.


SKIN:  warm to touch. No signs of rash 


NEUROLOGIC: Alert, oriented x 3,  No focal neurologic deficit.


RUE-PICC (6/4) clean





Labs


Lab





Laboratory Tests








Test


 6/6/20


11:05 6/6/20


16:17 6/6/20


20:45 6/6/20


22:21


 


Glucose (Fingerstick)


 256 mg/dL


(70-99) 215 mg/dL


(70-99) 370 mg/dL


(70-99) 383 mg/dL


(70-99)


 


Test


 6/7/20


05:34 6/7/20


07:00 


 





 


Glucose (Fingerstick)


 322 mg/dL


(70-99) 


 


 





 


White Blood Count


 


 5.7 x10^3/uL


(4.0-11.0) 


 





 


Red Blood Count


 


 4.28 x10^6/uL


(3.50-5.40) 


 





 


Hemoglobin


 


 11.8 g/dL


(12.0-15.5) 


 





 


Hematocrit


 


 35.8 %


(36.0-47.0) 


 





 


Mean Corpuscular Volume  84 fL ()   


 


Mean Corpuscular Hemoglobin  28 pg (25-35)   


 


Mean Corpuscular Hemoglobin


Concent 


 33 g/dL


(31-37) 


 





 


Red Cell Distribution Width


 


 15.2 %


(11.5-14.5) 


 





 


Platelet Count


 


 367 x10^3/uL


(140-400) 


 





 


Neutrophils (%) (Auto)  79 % (31-73)   


 


Lymphocytes (%) (Auto)  12 % (24-48)   


 


Monocytes (%) (Auto)  9 % (0-9)   


 


Eosinophils (%) (Auto)  0 % (0-3)   


 


Basophils (%) (Auto)  0 % (0-3)   


 


Neutrophils # (Auto)


 


 4.5 x10^3/uL


(1.8-7.7) 


 





 


Lymphocytes # (Auto)


 


 0.7 x10^3/uL


(1.0-4.8) 


 





 


Monocytes # (Auto)


 


 0.5 x10^3/uL


(0.0-1.1) 


 





 


Eosinophils # (Auto)


 


 0.0 x10^3/uL


(0.0-0.7) 


 





 


Basophils # (Auto)


 


 0.0 x10^3/uL


(0.0-0.2) 


 





 


Sodium Level


 


 137 mmol/L


(136-145) 


 





 


Potassium Level


 


 4.1 mmol/L


(3.5-5.1) 


 





 


Chloride Level


 


 100 mmol/L


() 


 





 


Carbon Dioxide Level


 


 26 mmol/L


(21-32) 


 





 


Anion Gap  11 (6-14)   


 


Blood Urea Nitrogen


 


 11 mg/dL


(7-20) 


 





 


Creatinine


 


 0.8 mg/dL


(0.6-1.0) 


 





 


Estimated GFR


(Cockcroft-Gault) 


 73.7 


 


 





 


Glucose Level


 


 313 mg/dL


(70-99) 


 





 


Calcium Level


 


 9.3 mg/dL


(8.5-10.1) 


 





 


Phosphorus Level


 


 4.5 mg/dL


(2.6-4.7) 


 





 


Magnesium Level


 


 2.1 mg/dL


(1.8-2.4) 


 














Objective


Assessment


1.  Acute pancreatitis, possibly from methotrexate or very high triglycerides


2.  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 

started on Leucovorin.  WBC improving 


3.  Adult-onset Still's disease.


4.  Hypertriglyceridemia





Plan


Plan of Care


Continue supportive care


Heme/onc eval following


On high-dose steroids 


Now off neutropenic precautions





Patient seen. Chart reviewed. Case discussed with NP. Agree with above plan/.











JOHNNY WIGGINS         Jun 7, 2020 09:43


JUS MAKI MD              Jun 7, 2020 20:45

## 2020-06-07 NOTE — NUR
Pharmacy TPN Dosing Note



S: EARL TIDWELL is a 59 year old F Currently receiving Central Continuous TPN started 
06/04/20



B:Pertinent PMH: Pancreatitis, NPO advancing to FLD



LABS:

Sodium:    137 

Potassium: 4.1 

Chloride:  100 

Calcium:   9.3 

Corrected Calcium: 10.10 

Magnesium: 2.1 

CO2:       26 

SCr:       0.8 

Glucose:   245-322 

Albumin:   3.0 

AST:       14 

ALT:       20 



TPN FORMULA:

TPN TYPE:  Central Continuous

AMINO ACIDS:         65 gm

DEXTROSE:            225 gm

LIPIDS:              20 gm

SODIUM CHLORIDE:     90 mEq

SODIUM ACETATE:      - mEq

SODIUM PHOSPHATE:    - mmol

POTASSIUM CHLORIDE:  70 mEq

POTASSIUM ACETATE:   - mEq

POTASSIUM PHOSPHATE: 13.6 mmol

MAGNESIUM:           18 mEq

CALCIUM:             10 mEq

INSULIN:             - units

MULTIPLE VITAMIN:    10 ml

TRACE ELEMENTS:      1 ml ml(s)



TPN PLAN:  Cont same TPN, labs WNL, repeat in am.





R: Continue TPN 

Will monitor electrolytes, glucose, and tolerance to TPN.



 KATT CRISOSTOMO [facility], 06/07/20 1134

## 2020-06-08 VITALS — DIASTOLIC BLOOD PRESSURE: 69 MMHG | SYSTOLIC BLOOD PRESSURE: 125 MMHG

## 2020-06-08 VITALS — DIASTOLIC BLOOD PRESSURE: 62 MMHG | SYSTOLIC BLOOD PRESSURE: 121 MMHG

## 2020-06-08 VITALS — SYSTOLIC BLOOD PRESSURE: 144 MMHG | DIASTOLIC BLOOD PRESSURE: 76 MMHG

## 2020-06-08 VITALS — SYSTOLIC BLOOD PRESSURE: 137 MMHG | DIASTOLIC BLOOD PRESSURE: 65 MMHG

## 2020-06-08 VITALS — SYSTOLIC BLOOD PRESSURE: 151 MMHG | DIASTOLIC BLOOD PRESSURE: 76 MMHG

## 2020-06-08 VITALS — SYSTOLIC BLOOD PRESSURE: 147 MMHG | DIASTOLIC BLOOD PRESSURE: 72 MMHG

## 2020-06-08 LAB
ALBUMIN SERPL-MCNC: 3 G/DL (ref 3.4–5)
ALBUMIN/GLOB SERPL: 0.7 {RATIO} (ref 1–1.7)
ALP SERPL-CCNC: 95 U/L (ref 46–116)
ALT SERPL-CCNC: 19 U/L (ref 14–59)
ANION GAP SERPL CALC-SCNC: 12 MMOL/L (ref 6–14)
AST SERPL-CCNC: 12 U/L (ref 15–37)
BILIRUB SERPL-MCNC: 0.2 MG/DL (ref 0.2–1)
BUN SERPL-MCNC: 15 MG/DL (ref 7–20)
BUN/CREAT SERPL: 21 (ref 6–20)
CALCIUM SERPL-MCNC: 9.4 MG/DL (ref 8.5–10.1)
CHLORIDE SERPL-SCNC: 101 MMOL/L (ref 98–107)
CO2 SERPL-SCNC: 25 MMOL/L (ref 21–32)
CREAT SERPL-MCNC: 0.7 MG/DL (ref 0.6–1)
GFR SERPLBLD BASED ON 1.73 SQ M-ARVRAT: 85.9 ML/MIN
GLOBULIN SER-MCNC: 4.2 G/DL (ref 2.2–3.8)
GLUCOSE SERPL-MCNC: 341 MG/DL (ref 70–99)
MAGNESIUM SERPL-MCNC: 2.3 MG/DL (ref 1.8–2.4)
PHOSPHATE SERPL-MCNC: 5 MG/DL (ref 2.6–4.7)
POTASSIUM SERPL-SCNC: 4.2 MMOL/L (ref 3.5–5.1)
PROT SERPL-MCNC: 7.2 G/DL (ref 6.4–8.2)
SODIUM SERPL-SCNC: 138 MMOL/L (ref 136–145)
TRIGL SERPL-MCNC: 233 MG/DL (ref 0–150)

## 2020-06-08 RX ADMIN — INSULIN GLARGINE SCH UNIT: 100 INJECTION, SOLUTION SUBCUTANEOUS at 21:32

## 2020-06-08 RX ADMIN — INSULIN LISPRO SCH UNITS: 100 INJECTION, SOLUTION INTRAVENOUS; SUBCUTANEOUS at 17:48

## 2020-06-08 RX ADMIN — LEUCOVORIN CALCIUM SCH MG: 5 TABLET ORAL at 14:26

## 2020-06-08 RX ADMIN — INSULIN GLARGINE SCH UNIT: 100 INJECTION, SOLUTION SUBCUTANEOUS at 10:01

## 2020-06-08 RX ADMIN — LEUCOVORIN CALCIUM SCH MG: 5 TABLET ORAL at 21:28

## 2020-06-08 RX ADMIN — PSYLLIUM HUSK SCH PKT: 3.4 POWDER ORAL at 09:00

## 2020-06-08 RX ADMIN — POTASSIUM CHLORIDE SCH MEQ: 1500 TABLET, EXTENDED RELEASE ORAL at 09:54

## 2020-06-08 RX ADMIN — SODIUM CHLORIDE, SODIUM LACTATE, POTASSIUM CHLORIDE, AND CALCIUM CHLORIDE SCH MLS/HR: .6; .31; .03; .02 INJECTION, SOLUTION INTRAVENOUS at 12:10

## 2020-06-08 RX ADMIN — ENOXAPARIN SODIUM SCH MG: 40 INJECTION SUBCUTANEOUS at 15:00

## 2020-06-08 RX ADMIN — LEUCOVORIN CALCIUM SCH MG: 5 TABLET ORAL at 06:24

## 2020-06-08 RX ADMIN — INSULIN LISPRO SCH UNITS: 100 INJECTION, SOLUTION INTRAVENOUS; SUBCUTANEOUS at 06:27

## 2020-06-08 RX ADMIN — POLYETHYLENE GLYCOL 3350 SCH GM: 17 POWDER, FOR SOLUTION ORAL at 09:00

## 2020-06-08 RX ADMIN — INSULIN LISPRO SCH UNITS: 100 INJECTION, SOLUTION INTRAVENOUS; SUBCUTANEOUS at 12:31

## 2020-06-08 NOTE — PDOC
Subjective:


Subjective:


Ate soft food for the first time this morning.


Denies abd pain, says hasn't needed pain meds in a couple days.


Stooling.


Mentions glucose hard to control.





Objective:


Vital Signs:





                                   Vital Signs








  Date Time  Temp Pulse Resp B/P (MAP) Pulse Ox O2 Delivery O2 Flow Rate FiO2


 


6/8/20 07:00 98.0 70 18 151/76 (101) 96 Room Air  





 98.0       


 


6/8/20 03:00       2.0 








Labs:





Laboratory Tests








Test


 6/7/20


11:04 6/7/20


18:31 6/7/20


19:41 6/7/20


23:40


 


Glucose (Fingerstick) 245 mg/dL  401 mg/dL  339 mg/dL  321 mg/dL 


 


Test


 6/8/20


04:45 6/8/20


06:09 


 





 


Sodium Level 138 mmol/L    


 


Potassium Level 4.2 mmol/L    


 


Chloride Level 101 mmol/L    


 


Carbon Dioxide Level 25 mmol/L    


 


Anion Gap 12    


 


Blood Urea Nitrogen 15 mg/dL    


 


Creatinine 0.7 mg/dL    


 


Estimated GFR


(Cockcroft-Gault) 85.9 


 


 


 





 


BUN/Creatinine Ratio 21    


 


Glucose Level 341 mg/dL    


 


Calcium Level 9.4 mg/dL    


 


Phosphorus Level 5.0 mg/dL    


 


Magnesium Level 2.3 mg/dL    


 


Total Bilirubin 0.2 mg/dL    


 


Aspartate Amino Transf


(AST/SGOT) 12 U/L 


 


 


 





 


Alanine Aminotransferase


(ALT/SGPT) 19 U/L 


 


 


 





 


Alkaline Phosphatase 95 U/L    


 


Total Protein 7.2 g/dL    


 


Albumin 3.0 g/dL    


 


Albumin/Globulin Ratio 0.7    


 


Triglycerides Level 233 mg/dL    


 


Glucose (Fingerstick)  372 mg/dL   











PE:





GEN: NAD


LUNGS: CTAB


HEART: RRR


ABD: S/ND/NT


NEURO/PSYCH: A & O 3





A/P:


Pancreatitis, hypertriglyceridemia


Still's disease, DM





--


Diet advanced, pain resolved.


?stop TPN, look toward DC - will d/w Dr. Parra.





Justicifation of Admission Dx:


Justifications for Admission:


Justification of Admission Dx:  Yes











AILYN OWEN          Jun 8, 2020 10:40

## 2020-06-08 NOTE — NUR
SW following. Discussed with RN, pt now on GI soft diet - eating more solid food and 
abdominal pain decreasing. Still on TPN, but likely can decrease this. Physician note states 
possibly discharge in the next 48-72 hours. SW will continue to follow for any discharge 
planning needs.

## 2020-06-08 NOTE — PDOC
PROGRESS NOTES


Chief Complaint


Chief Complaint


impression 








Acute pancreatitis - likely 2/2 hyper-triglyceridemia.  IgG4 RD less likely 

given level of 72 mg/DL and chronic immunosuppression on methotrexate and p

rednisone.  Dr. Manning talked to [facility] rheumatology concern that MTX could play a

role as well.  Hold dosing for leukopenia


 6/2 ct Progression of findings of acute pancreatitis. Pancreatic enhancement  

pattern is slightly heterogeneous but no definable nonenhancing component to 

suggest liquefactive necrosis at this time. In addition, there is been 

substantial increase in left lateral peripancreatic fluid which extends 


laterally to the left in the region of the lateral conal and renal fascia,and 

dissects distally into the left pelvis. This could be infected fluid, but does 

not appear to be an encapsulated collection.


Duodenal and jejunal intraluminal lipomas


Hypertriglyceridemia - will need outpatient Endocrinology referral for 

cholesterol metabolism treatment, I have suggested prescription 2g TID fish oil 

(Vascepa or Lovaza) in the meantime once pancreatitis resolves


Hyponatremia


DM2 with Hyperglycemia - A1c 13.6. Should continue on insulin therapy until A1c 

< 8, then consider PO options. Would probably have to avoid GLP1 therapy given 

her pancreatitis.  Would defer to an endocrinologist for this.


Celiac disease 


Adult onset stills disease


Still's disease w/ h/o methotrexate and prednisone use, leukopenia improved with

high-dose steroids and leucovorin


Anemia


Asthma


NATO on CPAP


GERD 


Leukopenia -likely related to chronic immunosuppression will hold MTX therapy 

for now and change to a prednisone taper on discharge.


Hyperglycemia secondary to high-dose steroids











FEN -patient on TPN and due to high-dose steroids requiring insulin for 

hyperglycemia


PPX - lovenox


FULL CODE


DIspo - inpatient for pancreatitis recovery


TPN until pain/nausea resolve with worsening pancreatitis. 


heme consult noted and appreciated


Advance diet as tolerated





History of Present Illness


History of Present Illness


Ms Almanza is a 58yo F [employer]  w/ PMHx celiac disease, adult onset stills 

disease, Anemia, Asthma, NATO on CPAP, GERD who presents with severe upper 

abdominal pain, nausea, vomiting, fatigue. She states this started suddenly this

morning while she was working from home on teleconference (works as a 

on the  base). She only had oatmeal for breakfast.


She has never had anything similar to this in the past.  She generally feels unw

ell.  She is not had any fever, diarrhea, chest pain, shortness of breath, 

dysuria, hematuria, blood in her stools.  She denies alcohol abuse.  Pain does 

not move anywhere.  Nothing seems to make it better or worse.  She has not tried

anything at home.  He does not drink alcohol, and is status post 

cholecystectomy.  No calcium disorders.


She takes 20mg methotrexate weekly on Fridays and is on 3 mg of chronic 

prednisone for her stills disease.  She was diagnosed with celiac disease 

January 2020 and has just recently started on a gluten-free diet.  No recent 

sick contacts that she can recall.


Labs significant for lipase 22843, glucose 477, , bilirubin 1.1, Calcium 

8.3, K 3.7, WBC 6.3, Hb 15, Platelets 247


CT abdomen pelvis shows surgically absent gallbladder and surgically absent 

uterus and describes duodenal and jejunal intraluminal lipomas. Findings of 

acute pancreatitis. No loculated collections. No biliary dilatation.


Admitted for further treatment.





5/27: Lipase down. A1c 13.6, Triglycerides 3252. Started on insulin gtt.


5/28: Abdominal pain and glucose improved.  Having ice chips per GI.  Afebrile. 

No CP or SOB. D/w [facility] rheumatology, to hold MTX therapy on 5/29 given her 

leukopenia and pancreatitis.


5/29: Overnight afebrile.  Labs improved. Still with left-sided abdominal pain. 

WBC 1.8.  She notes she had not previously had a formal diagnosis of diabetes.  

No CP or SOB


5/30: Pain improved, advancing diet per GI lipase 328.  WBC 2, K2.6, MG 1.4


5/31: Afebrile overnight.  Still with abdominal pain, no CP or SOB


6/1: Patient continues to have discomfort especially when she has a diet no 

fever or chills were reported nevertheless she says that she woke up drenched in

sweat in the middle of night, no other complaints were reported overnight, plan 

of care discussed in detail


6/2: Patient will continue to have bowel rest in light of her CT from yesterday.

No new complaints. 


6/6: Recommendations from Oncology greatly appreciated, continues to have pain, 

no other new complaints, she is tolerating some clear liquids continues to be on

TPN


6/7: Laboratory data reviewed with the patient and reassurance provided.  White 

blood cell count noted now on normal levels.  Her pain seems to have improved 

today as well and is requesting a little bit more solid food.  We will certainly

try this in order to hopefully discharge in the next 48 to 72 hours once she 

finishes high-dose steroids recommended by our oncology consultant.  We will try

to also touch base with Dr. Morris her oncologist to update her in keep her also

informed





Vitals


Vitals





Vital Signs








  Date Time  Temp Pulse Resp B/P (MAP) Pulse Ox O2 Delivery O2 Flow Rate FiO2


 


6/8/20 14:30 98.1 85 18 125/69 (87) 97 Room Air  





 98.1       


 


6/8/20 08:30       2.0 











Physical Exam


Physical Exam


GENERAL:  Propped up in bed, alert, relaxed 


HEENT:  Oral cavity pink, no thrush/lesions seen 


NECK:  Supple. 


LUNGS:  Clear, nonlabored 


HEART:  S1, S2 regular.


ABDOMEN:  Nondistended, soft, nontender, no rebound, BS present   


EXTREMITIES:  No edema, cyanosis.


SKIN:  warm to touch. No signs of rash 


NEUROLOGIC: Alert, oriented x 3,  No focal neurologic deficit.


RUE-PICC (6/4) clean


General:  Alert, Oriented X3, Cooperative


Heart:  Regular rate


Lungs:  Clear


Abdomen:  Soft, No tenderness


Extremities:  No clubbing, No cyanosis


Skin:  No rashes





Labs


LABS





Laboratory Tests








Test


 6/7/20


18:31 6/7/20


19:41 6/7/20


23:40 6/8/20


04:45


 


Glucose (Fingerstick)


 401 mg/dL


(70-99) 339 mg/dL


(70-99) 321 mg/dL


(70-99) 





 


Sodium Level


 


 


 


 138 mmol/L


(136-145)


 


Potassium Level


 


 


 


 4.2 mmol/L


(3.5-5.1)


 


Chloride Level


 


 


 


 101 mmol/L


()


 


Carbon Dioxide Level


 


 


 


 25 mmol/L


(21-32)


 


Anion Gap    12 (6-14) 


 


Blood Urea Nitrogen


 


 


 


 15 mg/dL


(7-20)


 


Creatinine


 


 


 


 0.7 mg/dL


(0.6-1.0)


 


Estimated GFR


(Cockcroft-Gault) 


 


 


 85.9 





 


BUN/Creatinine Ratio    21 (6-20) 


 


Glucose Level


 


 


 


 341 mg/dL


(70-99)


 


Calcium Level


 


 


 


 9.4 mg/dL


(8.5-10.1)


 


Phosphorus Level


 


 


 


 5.0 mg/dL


(2.6-4.7)


 


Magnesium Level


 


 


 


 2.3 mg/dL


(1.8-2.4)


 


Total Bilirubin


 


 


 


 0.2 mg/dL


(0.2-1.0)


 


Aspartate Amino Transf


(AST/SGOT) 


 


 


 12 U/L (15-37) 





 


Alanine Aminotransferase


(ALT/SGPT) 


 


 


 19 U/L (14-59) 





 


Alkaline Phosphatase


 


 


 


 95 U/L


()


 


Total Protein


 


 


 


 7.2 g/dL


(6.4-8.2)


 


Albumin


 


 


 


 3.0 g/dL


(3.4-5.0)


 


Albumin/Globulin Ratio    0.7 (1.0-1.7) 


 


Triglycerides Level


 


 


 


 233 mg/dL


(0-150)


 


Test


 6/8/20


06:09 6/8/20


11:40 6/8/20


16:44 





 


Glucose (Fingerstick)


 372 mg/dL


(70-99) 372 mg/dL


(70-99) 236 mg/dL


(70-99) 














Assessment and Plan


Assessmemt and Plan


Problems


Medical Problems:


(1) Dehydration


Status: Acute  





(2) Hyperglycemia


Status: Acute  





(3) Pancreatitis, acute


Status: Acute  











Comment


Review of Relevant


I have reviewed the following items elizabeth (where applicable) has been applied.


Labs





Laboratory Tests








Test


 6/6/20


20:45 6/6/20


22:21 6/7/20


05:34 6/7/20


07:00


 


Glucose (Fingerstick)


 370 mg/dL


(70-99) 383 mg/dL


(70-99) 322 mg/dL


(70-99) 





 


White Blood Count


 


 


 


 5.7 x10^3/uL


(4.0-11.0)


 


Red Blood Count


 


 


 


 4.28 x10^6/uL


(3.50-5.40)


 


Hemoglobin


 


 


 


 11.8 g/dL


(12.0-15.5)


 


Hematocrit


 


 


 


 35.8 %


(36.0-47.0)


 


Mean Corpuscular Volume    84 fL () 


 


Mean Corpuscular Hemoglobin    28 pg (25-35) 


 


Mean Corpuscular Hemoglobin


Concent 


 


 


 33 g/dL


(31-37)


 


Red Cell Distribution Width


 


 


 


 15.2 %


(11.5-14.5)


 


Platelet Count


 


 


 


 367 x10^3/uL


(140-400)


 


Neutrophils (%) (Auto)    79 % (31-73) 


 


Lymphocytes (%) (Auto)    12 % (24-48) 


 


Monocytes (%) (Auto)    9 % (0-9) 


 


Eosinophils (%) (Auto)    0 % (0-3) 


 


Basophils (%) (Auto)    0 % (0-3) 


 


Neutrophils # (Auto)


 


 


 


 4.5 x10^3/uL


(1.8-7.7)


 


Lymphocytes # (Auto)


 


 


 


 0.7 x10^3/uL


(1.0-4.8)


 


Monocytes # (Auto)


 


 


 


 0.5 x10^3/uL


(0.0-1.1)


 


Eosinophils # (Auto)


 


 


 


 0.0 x10^3/uL


(0.0-0.7)


 


Basophils # (Auto)


 


 


 


 0.0 x10^3/uL


(0.0-0.2)


 


Sodium Level


 


 


 


 137 mmol/L


(136-145)


 


Potassium Level


 


 


 


 4.1 mmol/L


(3.5-5.1)


 


Chloride Level


 


 


 


 100 mmol/L


()


 


Carbon Dioxide Level


 


 


 


 26 mmol/L


(21-32)


 


Anion Gap    11 (6-14) 


 


Blood Urea Nitrogen


 


 


 


 11 mg/dL


(7-20)


 


Creatinine


 


 


 


 0.8 mg/dL


(0.6-1.0)


 


Estimated GFR


(Cockcroft-Gault) 


 


 


 73.7 





 


Glucose Level


 


 


 


 313 mg/dL


(70-99)


 


Calcium Level


 


 


 


 9.3 mg/dL


(8.5-10.1)


 


Phosphorus Level


 


 


 


 4.5 mg/dL


(2.6-4.7)


 


Magnesium Level


 


 


 


 2.1 mg/dL


(1.8-2.4)


 


Test


 6/7/20


11:04 6/7/20


18:31 6/7/20


19:41 6/7/20


23:40


 


Glucose (Fingerstick)


 245 mg/dL


(70-99) 401 mg/dL


(70-99) 339 mg/dL


(70-99) 321 mg/dL


(70-99)


 


Test


 6/8/20


04:45 6/8/20


06:09 6/8/20


11:40 6/8/20


16:44


 


Sodium Level


 138 mmol/L


(136-145) 


 


 





 


Potassium Level


 4.2 mmol/L


(3.5-5.1) 


 


 





 


Chloride Level


 101 mmol/L


() 


 


 





 


Carbon Dioxide Level


 25 mmol/L


(21-32) 


 


 





 


Anion Gap 12 (6-14)    


 


Blood Urea Nitrogen


 15 mg/dL


(7-20) 


 


 





 


Creatinine


 0.7 mg/dL


(0.6-1.0) 


 


 





 


Estimated GFR


(Cockcroft-Gault) 85.9 


 


 


 





 


BUN/Creatinine Ratio 21 (6-20)    


 


Glucose Level


 341 mg/dL


(70-99) 


 


 





 


Calcium Level


 9.4 mg/dL


(8.5-10.1) 


 


 





 


Phosphorus Level


 5.0 mg/dL


(2.6-4.7) 


 


 





 


Magnesium Level


 2.3 mg/dL


(1.8-2.4) 


 


 





 


Total Bilirubin


 0.2 mg/dL


(0.2-1.0) 


 


 





 


Aspartate Amino Transf


(AST/SGOT) 12 U/L (15-37) 


 


 


 





 


Alanine Aminotransferase


(ALT/SGPT) 19 U/L (14-59) 


 


 


 





 


Alkaline Phosphatase


 95 U/L


() 


 


 





 


Total Protein


 7.2 g/dL


(6.4-8.2) 


 


 





 


Albumin


 3.0 g/dL


(3.4-5.0) 


 


 





 


Albumin/Globulin Ratio 0.7 (1.0-1.7)    


 


Triglycerides Level


 233 mg/dL


(0-150) 


 


 





 


Glucose (Fingerstick)


 


 372 mg/dL


(70-99) 372 mg/dL


(70-99) 236 mg/dL


(70-99)








Laboratory Tests








Test


 6/7/20


18:31 6/7/20


19:41 6/7/20


23:40 6/8/20


04:45


 


Glucose (Fingerstick)


 401 mg/dL


(70-99) 339 mg/dL


(70-99) 321 mg/dL


(70-99) 





 


Sodium Level


 


 


 


 138 mmol/L


(136-145)


 


Potassium Level


 


 


 


 4.2 mmol/L


(3.5-5.1)


 


Chloride Level


 


 


 


 101 mmol/L


()


 


Carbon Dioxide Level


 


 


 


 25 mmol/L


(21-32)


 


Anion Gap    12 (6-14) 


 


Blood Urea Nitrogen


 


 


 


 15 mg/dL


(7-20)


 


Creatinine


 


 


 


 0.7 mg/dL


(0.6-1.0)


 


Estimated GFR


(Cockcroft-Gault) 


 


 


 85.9 





 


BUN/Creatinine Ratio    21 (6-20) 


 


Glucose Level


 


 


 


 341 mg/dL


(70-99)


 


Calcium Level


 


 


 


 9.4 mg/dL


(8.5-10.1)


 


Phosphorus Level


 


 


 


 5.0 mg/dL


(2.6-4.7)


 


Magnesium Level


 


 


 


 2.3 mg/dL


(1.8-2.4)


 


Total Bilirubin


 


 


 


 0.2 mg/dL


(0.2-1.0)


 


Aspartate Amino Transf


(AST/SGOT) 


 


 


 12 U/L (15-37) 





 


Alanine Aminotransferase


(ALT/SGPT) 


 


 


 19 U/L (14-59) 





 


Alkaline Phosphatase


 


 


 


 95 U/L


()


 


Total Protein


 


 


 


 7.2 g/dL


(6.4-8.2)


 


Albumin


 


 


 


 3.0 g/dL


(3.4-5.0)


 


Albumin/Globulin Ratio    0.7 (1.0-1.7) 


 


Triglycerides Level


 


 


 


 233 mg/dL


(0-150)


 


Test


 6/8/20


06:09 6/8/20


11:40 6/8/20


16:44 





 


Glucose (Fingerstick)


 372 mg/dL


(70-99) 372 mg/dL


(70-99) 236 mg/dL


(70-99) 











Medications





Current Medications


Iohexol (Omnipaque 300 Mg/ml) 75 ml 1X  ONCE IV  Last administered on 5/26/20at 

11:30;  Start 5/26/20 at 11:30;  Stop 5/26/20 at 11:31;  Status DC


Iohexol (Omnipaque 240 Mg/ml) 50 ml 1X  ONCE PO  Last administered on 5/26/20at 

11:30;  Start 5/26/20 at 11:30;  Stop 5/26/20 at 11:31;  Status DC


Info (CONTRAST GIVEN -- Rx MONITORING) 1 each PRN DAILY  PRN MC SEE COMMENTS;  

Start 5/26/20 at 11:30;  Stop 5/28/20 at 11:29;  Status DC


Sodium Chloride 1,000 ml @  0 mls/hr 1X  ONCE IV  Last administered on 5/26/20at

12:27;  Start 5/26/20 at 12:15;  Stop 5/26/20 at 12:16;  Status DC


Morphine Sulfate (Morphine Sulfate) 5 mg 1X  ONCE IV  Last administered on 5/ 26/20at 12:45;  Start 5/26/20 at 12:30;  Stop 5/26/20 at 12:33;  Status DC


Morphine Sulfate (Morphine Sulfate) 5 mg 1X  ONCE IV ;  Start 5/26/20 at 13:45; 

Stop 5/26/20 at 13:46;  Status DC


Morphine Sulfate (Morphine Sulfate) 4 mg PRN Q2HR  PRN IV MODERATE TO SEVERE 

PAIN Last administered on 5/28/20at 16:18;  Start 5/26/20 at 14:45;  Stop 6/1/20

at 10:23;  Status DC


Sodium Chloride 1,000 ml @  100 mls/hr Q10H IV  Last administered on 6/1/20at 

08:27;  Start 5/26/20 at 14:44;  Stop 6/1/20 at 10:23;  Status DC


Ondansetron HCl (Zofran) 4 mg PRN Q4HRS  PRN IV NAUSEA/VOMITING Last 

administered on 6/2/20at 14:06;  Start 5/26/20 at 14:45


Acetaminophen (Tylenol Supp) 650 mg PRN Q4HRS  PRN NE TEMP OVER 100.4F OR MILD 

PAIN;  Start 5/26/20 at 14:45


Enoxaparin Sodium (Lovenox 40mg Syringe) 40 mg Q24H SQ  Last administered on 

6/5/20at 14:13;  Start 5/26/20 at 15:00


Insulin Human Lispro (HumaLOG) 0-9 UNITS Q4HRS SQ  Last administered on 

5/30/20at 18:15;  Start 5/26/20 at 16:00;  Stop 5/30/20 at 19:32;  Status DC


Dextrose (Dextrose 50%-Water Syringe) 12.5 gm PRN Q15MIN  PRN IV SEE COMMENTS;  

Start 5/26/20 at 14:45;  Stop 6/5/20 at 13:20;  Status DC


Albuterol Sulfate (Ventolin Neb Soln) 3 mg PRN QID  PRN NEB SHORTNESS OF BREATH;

 Start 5/26/20 at 15:15


Sodium Chloride (Saline Mist Nasal) 1 néstor PRN Q1HR  PRN NS NASAL CONGESTION;  

Start 5/26/20 at 15:15


Methylprednisolone Sodium Succinate (SOLU-Medrol 40MG VIAL) 40 mg DAILY IV  Last

administered on 5/28/20at 08:21;  Start 5/26/20 at 15:15;  Stop 5/28/20 at 

13:44;  Status DC


Insulin Human Regular 100 unit/ Sodium Chloride 101 ml @ 0 mls/hr CONT  PRN IV 

SEE I/O RECORD Last administered on 5/27/20at 02:12;  Start 5/26/20 at 18:00;  

Stop 6/8/20 at 12:54;  Status DC


Fentanyl Citrate (Fentanyl 2ml Vial) 50 mcg PRN Q2HR  PRN IVP MOD TO SEVERE 

PAIN, 2ND CHOICE Last administered on 6/4/20at 16:21;  Start 5/27/20 at 11:45


Famotidine (Pepcid Vial) 20 mg QHS IVP  Last administered on 5/31/20at 22:14;  

Start 5/28/20 at 21:00;  Stop 6/1/20 at 10:21;  Status DC


Prednisone (Prednisone) 3 mg DAILY PO  Last administered on 6/8/20at 09:53;  

Start 5/29/20 at 09:00


Potassium Chloride/Water 100 ml @  100 mls/hr Q1H IV  Last administered on 

5/30/20at 15:22;  Start 5/30/20 at 11:00;  Stop 5/30/20 at 14:59;  Status DC


Potassium Chloride (Klor-Con) 40 meq 1X  ONCE PO  Last administered on 5/30/20at

11:01;  Start 5/30/20 at 10:15;  Stop 5/30/20 at 10:22;  Status DC


Tramadol HCl (Ultram) 50 mg PRN Q6HRS  PRN PO MODERATE PAIN 4-6 Last 

administered on 6/2/20at 06:08;  Start 5/30/20 at 10:15


Oxycodone HCl (Roxicodone) 5 mg PRN Q6HRS  PRN PO SEVERE PAIN 7-10 Last 

administered on 5/30/20at 11:08;  Start 5/30/20 at 10:15;  Stop 5/30/20 at 15:

30;  Status DC


Magnesium Sulfate 100 ml @  25 mls/hr 1X  ONCE IV  Last administered on 

5/30/20at 11:06;  Start 5/30/20 at 11:00;  Stop 5/30/20 at 14:59;  Status DC


Magnesium Hydroxide (Milk Of Magnesia) 2,400 mg PRN DAILY  PRN PO CONSTIPATION 

2ND CHOICE;  Start 5/30/20 at 15:30


Psyllium Hydrophilic Mucilloid (Metamucil Fiber Packet) 1 pkt DAILY PO  Last 

administered on 6/6/20at 10:29;  Start 5/30/20 at 15:30


Polyethylene Glycol (miraLAX PACKET) 17 gm DAILY PO  Last administered on 

6/6/20at 10:29;  Start 5/30/20 at 15:30


Oxycodone HCl (Roxicodone) 5 mg PRN Q4HRS  PRN PO SEVERE PAIN 7-10 Last 

administered on 6/6/20at 10:30;  Start 5/30/20 at 15:30


Insulin Human Lispro (HumaLOG) 0-9 UNITS TIDWMEALHC SQ  Last administered on 

6/1/20at 17:14;  Start 5/30/20 at 19:30;  Stop 6/5/20 at 06:31;  Status DC


Famotidine (Pepcid) 20 mg QHS PO  Last administered on 6/1/20at 20:57;  Start 

6/1/20 at 21:00;  Stop 6/2/20 at 10:56;  Status DC


Iohexol (Omnipaque 300 Mg/ml) 75 ml 1X  ONCE IV  Last administered on 6/1/20at 

11:45;  Start 6/1/20 at 11:45;  Stop 6/1/20 at 11:46;  Status DC


Info (CONTRAST GIVEN -- Rx MONITORING) 1 each PRN DAILY  PRN MC SEE COMMENTS;  

Start 6/1/20 at 11:45;  Stop 6/3/20 at 11:44;  Status DC


Ringer's Solution 1,000 ml @  75 mls/hr [id number] IV  Last administered on 

6/4/20at 01:53;  Start 6/2/20 at 09:30


Potassium Chloride/Water 100 ml @  100 mls/hr Q1H IV  Last administered on 

6/2/20at 21:27;  Start 6/2/20 at 10:00;  Stop 6/2/20 at 21:59;  Status DC


Famotidine (Pepcid Vial) 20 mg QHS IVP  Last administered on 6/7/20at 22:11;  

Start 6/2/20 at 21:00;  Stop 6/8/20 at 10:40;  Status DC


Bisacodyl (Dulcolax Supp) 10 mg PRN DAILY  PRN NE CONSTIPATION;  Start 6/2/20 at

11:00


Leucovorin Calcium (Wellcovorin) 5 mg DAILY PO  Last administered on 6/3/20at 

09:53;  Start 6/3/20 at 10:00;  Stop 6/4/20 at 09:24;  Status DC


Info (Tpn Per Pharmacy) 1 each PRN DAILY  PRN MC SEE COMMENTS Last administered 

on 6/7/20at 11:32;  Start 6/3/20 at 12:00;  Stop 6/8/20 at 13:26;  Status DC


Potassium Chloride/Water 100 ml @  100 mls/hr Q1H IV  Last administered on 

6/4/20at 01:53;  Start 6/3/20 at 20:00;  Stop 6/3/20 at 23:59;  Status DC


Leucovorin Calcium (Wellcovorin) 15 mg Q8HRS PO  Last administered on 6/8/20at 

14:26;  Start 6/4/20 at 10:00


Lidocaine HCl (Buffered Lidocaine 1%) 3 ml 1X  ONCE INJ ;  Start 6/4/20 at 

12:00;  Stop 6/4/20 at 12:01;  Status DC


Lidocaine HCl (Buffered Lidocaine 1%) 3 ml STK-MED ONCE .ROUTE ;  Start 6/4/20 

at 11:58;  Stop 6/4/20 at 11:59;  Status DC


Sodium Chloride 90 meq/Potassium Chloride 50 meq/ Potassium Phosphate 13.6 

mmol/Magnesium Sulfate 10 meq/ Calcium Gluconate 10 meq/ Multivitamins 10 

ml/Chromium/ Copper/Manganese/ Seleni/Zn 1 ml/ Total Parenteral Nutrition/Amino 

Acids/Dextrose/ Fat Emulsion Intravenous 1,512 ml @  63 mls/hr TPN  CONT IV  

Last administered on 6/4/20at 22:12;  Start 6/4/20 at 22:00;  Stop 6/5/20 at 

21:59;  Status DC


Magnesium Sulfate/ Dextrose 100 ml @  100 mls/hr 1X  ONCE IV  Last administered 

on 6/4/20at 16:16;  Start 6/4/20 at 16:00;  Stop 6/4/20 at 16:59;  Status DC


Insulin Human Lispro (HumaLOG) 0-5 UNITS TIDWMEALS SQ ;  Start 6/5/20 at 08:00; 

Status Cancel


Dextrose (Dextrose 50%-Water Syringe) 12.5 gm PRN Q15MIN  PRN IV SEE COMMENTS;  

Start 6/5/20 at 07:45;  Status Cancel


Insulin Human Lispro (HumaLOG) 0-7 UNITS TIDWMEALS SQ ;  Start 6/5/20 at 08:00; 

Stop 6/5/20 at 08:00;  Status DC


Dextrose (Dextrose 50%-Water Syringe) 12.5 gm PRN Q15MIN  PRN IV SEE COMMENTS;  

Start 6/5/20 at 08:00;  Status Cancel


Insulin Human Lispro (HumaLOG) 0-9 UNITS TIDWMEALS SQ  Last administered on 

6/6/20at 17:21;  Start 6/5/20 at 08:00;  Stop 6/6/20 at 23:09;  Status DC


Dextrose (Dextrose 50%-Water Syringe) 12.5 gm PRN Q15MIN  PRN IV SEE COMMENTS;  

Start 6/5/20 at 08:00;  Stop 6/5/20 at 13:17;  Status DC


Potassium Chloride (Klor-Con) 40 meq 1X  ONCE PO  Last administered on 6/5/20at 

11:03;  Start 6/5/20 at 10:45;  Stop 6/5/20 at 10:46;  Status DC


Potassium Chloride (Klor-Con) 20 meq [id number] PO  Last administered on 

6/8/20at 09:54;  Start 6/6/20 at 08:00


Magnesium Sulfate 50 ml @ 25 mls/hr 1X  ONCE IV  Last administered on 6/5/20at 

11:04;  Start 6/5/20 at 10:45;  Stop 6/5/20 at 12:44;  Status DC


Dextrose (Dextrose 50%-Water Syringe) 12.5 gm PRN Q15MIN  PRN IV SEE COMMENTS;  

Start 6/5/20 at 13:30


Sodium Chloride 90 meq/Potassium Chloride 70 meq/ Potassium Phosphate 13.6 

mmol/Magnesium Sulfate 18 meq/ Calcium Gluconate 10 meq/ Multivitamins 10 

ml/Chromium/ Copper/Manganese/ Seleni/Zn 1 ml/ Total Parenteral Nutrition/Amino 

Acids/Dextrose/ Fat Emulsion Intravenous 1,512 ml @  63 mls/hr TPN  CONT IV  

Last administered on 6/5/20at 22:02;  Start 6/5/20 at 22:00;  Stop 6/6/20 at 

21:59;  Status DC


Methylprednisolone Sodium Succinate (SOLU-Medrol 125MG VIAL) 1,000 mg DAILY08 IV

;  Start 6/6/20 at 08:00;  Stop 6/8/20 at 07:59;  Status UNV


Methylprednisolone Sodium Succinate 1000 mg/Sodium Chloride 100 ml @  100 mls/hr

Q24H IV  Last administered on 6/7/20at 10:14;  Start 6/6/20 at 08:00;  Stop 6/8/ 20 at 07:59;  Status DC


Sodium Chloride 90 meq/Potassium Chloride 70 meq/ Potassium Phosphate 13.6 

mmol/Magnesium Sulfate 18 meq/ Calcium Gluconate 10 meq/ Multivitamins 10 

ml/Chromium/ Copper/Manganese/ Seleni/Zn 1 ml/ Total Parenteral Nutrition/Amino 

Acids/Dextrose/ Fat Emulsion Intravenous 1,512 ml @  63 mls/hr TPN  CONT IV  

Last administered on 6/6/20at 22:12;  Start 6/6/20 at 22:00;  Stop 6/7/20 at 

21:59;  Status DC


Insulin Glargine (Lantus Syringe) 15 unit QHS SQ  Last administered on 6/7/20at 

00:01;  Start 6/6/20 at 23:30;  Stop 6/7/20 at 07:33;  Status DC


Insulin Human Lispro (HumaLOG) 0-9 UNITS Q6HRS SQ  Last administered on 6/8/20at

12:31;  Start 6/6/20 at 23:30


Dextrose (Dextrose 50%-Water Syringe) 12.5 gm PRN Q15MIN  PRN IV SEE COMMENTS;  

Start 6/6/20 at 23:15;  Status Cancel


Insulin Glargine (Lantus Syringe) 10 unit Q12H SQ  Last administered on 6/8/20at

10:01;  Start 6/7/20 at 09:00


Sodium Chloride 90 meq/Potassium Chloride 70 meq/ Potassium Phosphate 13.6 

mmol/Magnesium Sulfate 18 meq/ Calcium Gluconate 10 meq/ Multivitamins 10 

ml/Chromium/ Copper/Manganese/ Seleni/Zn 1 ml/ Total Parenteral Nutrition/Amino 

Acids/Dextrose/ Fat Emulsion Intravenous 1,512 ml @  63 mls/hr TPN  CONT IV  

Last administered on 6/7/20at 22:12;  Start 6/7/20 at 22:00;  Stop 6/8/20 at 

21:59


Insulin Human Lispro (HumaLOG) 3 units 1X  ONCE SQ  Last administered on 

6/7/20at 18:54;  Start 6/7/20 at 18:45;  Stop 6/7/20 at 18:46;  Status DC


Famotidine (Pepcid) 20 mg QHS PO ;  Start 6/8/20 at 21:00





Active Scripts


Active


Reported


Zyrtec (Cetirizine Hcl) 10 Mg Capsule 10 Mg PO DAILY


Methotrexate (Methotrexate Sodium) 2.5 Mg Tablet 8 Tab PO WEEKLY


Protonix  ** (Pantoprazole Sodium) 40 Mg Tablet.dr 40 Mg PO DAILYAC


Folic Acid 0.8 Mg Capsule 1 Cap PO DAILY 30 Days


Ferrous Sulfate 325 Mg Tablet 1 Tab PO DAILY


Vitals/I & O





Vital Sign - Last 24 Hours








 6/7/20 6/7/20 6/7/20 6/8/20





 19:30 22:30 23:10 03:00


 


Temp 97.5  97.6 97.8





 97.5  97.6 97.8


 


Pulse 76  67 75


 


Resp 20  20 20


 


B/P (MAP) 142/77 (98)  144/75 (98) 137/65 (89)


 


Pulse Ox 95  96 97


 


O2 Delivery Room Air Room Air Room Air Nasal Cannula


 


O2 Flow Rate    2.0


 


    





    





 6/8/20 6/8/20 6/8/20 6/8/20





 07:00 08:30 10:59 14:30


 


Temp 98.0  97.8 98.1





 98.0  97.8 98.1


 


Pulse 70  69 85


 


Resp 18  18 18


 


B/P (MAP) 151/76 (101)  147/72 (97) 125/69 (87)


 


Pulse Ox 96  99 97


 


O2 Delivery Room Air Room Air Room Air Room Air


 


O2 Flow Rate  2.0  














Intake and Output   


 


 6/7/20 6/7/20 6/8/20





 15:00 23:00 07:00


 


Intake Total 600 ml 300 ml 


 


Balance 600 ml 300 ml 











Nutrition Consultation


Dietary Evaluation:


Recommendations by RD:  Dietary education by RD


Comments:  


REC continue nutrition care order


Expected Outcomes/Goals:  


to meet >75% est nutr needs via po intake





Malnutrition Findings:


Food and Nutrition Intake (Mod:  <75% est energy req 7days


Weight Status:  Appropriate





Hemodynamically unstable?:  No


Is patient in severe pain?:  No


Is NPO status required?:  No











JEIMY VELAZQUEZ MD                  Jun 8, 2020 17:46

## 2020-06-08 NOTE — PDOC
SURGICAL PROGRESS NOTE


Subjective


no complaints


no nausea, no pain


tolerating soft diet


Vital Signs





Vital Signs








  Date Time  Temp Pulse Resp B/P (MAP) Pulse Ox O2 Delivery O2 Flow Rate FiO2


 


6/8/20 07:00 98.0 70 18 151/76 (101) 96 Room Air  





 98.0       


 


6/8/20 03:00       2.0 








I&O











Intake and Output 


 


 6/8/20





 07:00


 


Intake Total 900 ml


 


Balance 900 ml


 


 


 


Intake Oral 900 ml


 


# Voids 3








General:  Alert, Oriented X3, Cooperative


Abdomen:  Soft, No tenderness


Labs





Laboratory Tests








Test


 6/6/20


11:05 6/6/20


16:17 6/6/20


20:45 6/6/20


22:21


 


Glucose (Fingerstick)


 256 mg/dL


(70-99) 215 mg/dL


(70-99) 370 mg/dL


(70-99) 383 mg/dL


(70-99)


 


Test


 6/7/20


05:34 6/7/20


07:00 6/7/20


11:04 6/7/20


18:31


 


Glucose (Fingerstick)


 322 mg/dL


(70-99) 


 245 mg/dL


(70-99) 401 mg/dL


(70-99)


 


White Blood Count


 


 5.7 x10^3/uL


(4.0-11.0) 


 





 


Red Blood Count


 


 4.28 x10^6/uL


(3.50-5.40) 


 





 


Hemoglobin


 


 11.8 g/dL


(12.0-15.5) 


 





 


Hematocrit


 


 35.8 %


(36.0-47.0) 


 





 


Mean Corpuscular Volume  84 fL ()   


 


Mean Corpuscular Hemoglobin  28 pg (25-35)   


 


Mean Corpuscular Hemoglobin


Concent 


 33 g/dL


(31-37) 


 





 


Red Cell Distribution Width


 


 15.2 %


(11.5-14.5) 


 





 


Platelet Count


 


 367 x10^3/uL


(140-400) 


 





 


Neutrophils (%) (Auto)  79 % (31-73)   


 


Lymphocytes (%) (Auto)  12 % (24-48)   


 


Monocytes (%) (Auto)  9 % (0-9)   


 


Eosinophils (%) (Auto)  0 % (0-3)   


 


Basophils (%) (Auto)  0 % (0-3)   


 


Neutrophils # (Auto)


 


 4.5 x10^3/uL


(1.8-7.7) 


 





 


Lymphocytes # (Auto)


 


 0.7 x10^3/uL


(1.0-4.8) 


 





 


Monocytes # (Auto)


 


 0.5 x10^3/uL


(0.0-1.1) 


 





 


Eosinophils # (Auto)


 


 0.0 x10^3/uL


(0.0-0.7) 


 





 


Basophils # (Auto)


 


 0.0 x10^3/uL


(0.0-0.2) 


 





 


Sodium Level


 


 137 mmol/L


(136-145) 


 





 


Potassium Level


 


 4.1 mmol/L


(3.5-5.1) 


 





 


Chloride Level


 


 100 mmol/L


() 


 





 


Carbon Dioxide Level


 


 26 mmol/L


(21-32) 


 





 


Anion Gap  11 (6-14)   


 


Blood Urea Nitrogen


 


 11 mg/dL


(7-20) 


 





 


Creatinine


 


 0.8 mg/dL


(0.6-1.0) 


 





 


Estimated GFR


(Cockcroft-Gault) 


 73.7 


 


 





 


Glucose Level


 


 313 mg/dL


(70-99) 


 





 


Calcium Level


 


 9.3 mg/dL


(8.5-10.1) 


 





 


Phosphorus Level


 


 4.5 mg/dL


(2.6-4.7) 


 





 


Magnesium Level


 


 2.1 mg/dL


(1.8-2.4) 


 





 


Test


 6/7/20


19:41 6/7/20


23:40 6/8/20


04:45 6/8/20


06:09


 


Glucose (Fingerstick)


 339 mg/dL


(70-99) 321 mg/dL


(70-99) 


 372 mg/dL


(70-99)


 


Sodium Level


 


 


 138 mmol/L


(136-145) 





 


Potassium Level


 


 


 4.2 mmol/L


(3.5-5.1) 





 


Chloride Level


 


 


 101 mmol/L


() 





 


Carbon Dioxide Level


 


 


 25 mmol/L


(21-32) 





 


Anion Gap   12 (6-14)  


 


Blood Urea Nitrogen


 


 


 15 mg/dL


(7-20) 





 


Creatinine


 


 


 0.7 mg/dL


(0.6-1.0) 





 


Estimated GFR


(Cockcroft-Gault) 


 


 85.9 


 





 


BUN/Creatinine Ratio   21 (6-20)  


 


Glucose Level


 


 


 341 mg/dL


(70-99) 





 


Calcium Level


 


 


 9.4 mg/dL


(8.5-10.1) 





 


Phosphorus Level


 


 


 5.0 mg/dL


(2.6-4.7) 





 


Magnesium Level


 


 


 2.3 mg/dL


(1.8-2.4) 





 


Total Bilirubin


 


 


 0.2 mg/dL


(0.2-1.0) 





 


Aspartate Amino Transf


(AST/SGOT) 


 


 12 U/L (15-37) 


 





 


Alanine Aminotransferase


(ALT/SGPT) 


 


 19 U/L (14-59) 


 





 


Alkaline Phosphatase


 


 


 95 U/L


() 





 


Total Protein


 


 


 7.2 g/dL


(6.4-8.2) 





 


Albumin


 


 


 3.0 g/dL


(3.4-5.0) 





 


Albumin/Globulin Ratio   0.7 (1.0-1.7)  


 


Triglycerides Level


 


 


 233 mg/dL


(0-150) 











Laboratory Tests








Test


 6/7/20


11:04 6/7/20


18:31 6/7/20


19:41 6/7/20


23:40


 


Glucose (Fingerstick)


 245 mg/dL


(70-99) 401 mg/dL


(70-99) 339 mg/dL


(70-99) 321 mg/dL


(70-99)


 


Test


 6/8/20


04:45 6/8/20


06:09 


 





 


Sodium Level


 138 mmol/L


(136-145) 


 


 





 


Potassium Level


 4.2 mmol/L


(3.5-5.1) 


 


 





 


Chloride Level


 101 mmol/L


() 


 


 





 


Carbon Dioxide Level


 25 mmol/L


(21-32) 


 


 





 


Anion Gap 12 (6-14)    


 


Blood Urea Nitrogen


 15 mg/dL


(7-20) 


 


 





 


Creatinine


 0.7 mg/dL


(0.6-1.0) 


 


 





 


Estimated GFR


(Cockcroft-Gault) 85.9 


 


 


 





 


BUN/Creatinine Ratio 21 (6-20)    


 


Glucose Level


 341 mg/dL


(70-99) 


 


 





 


Calcium Level


 9.4 mg/dL


(8.5-10.1) 


 


 





 


Phosphorus Level


 5.0 mg/dL


(2.6-4.7) 


 


 





 


Magnesium Level


 2.3 mg/dL


(1.8-2.4) 


 


 





 


Total Bilirubin


 0.2 mg/dL


(0.2-1.0) 


 


 





 


Aspartate Amino Transf


(AST/SGOT) 12 U/L (15-37) 


 


 


 





 


Alanine Aminotransferase


(ALT/SGPT) 19 U/L (14-59) 


 


 


 





 


Alkaline Phosphatase


 95 U/L


() 


 


 





 


Total Protein


 7.2 g/dL


(6.4-8.2) 


 


 





 


Albumin


 3.0 g/dL


(3.4-5.0) 


 


 





 


Albumin/Globulin Ratio 0.7 (1.0-1.7)    


 


Triglycerides Level


 233 mg/dL


(0-150) 


 


 





 


Glucose (Fingerstick)


 


 372 mg/dL


(70-99) 


 











Problem List


Problems


Medical Problems:


(1) Dehydration


Status: Acute  





(2) Hyperglycemia


Status: Acute  





(3) Pancreatitis, acute


Status: Acute  








Assessment/Plan


improved


no surgical recs





Justicifation of Admission Dx:


Justifications for Admission:


Justification of Admission Dx:  Yes











SYLVAIN ENGLISH             Jun 8, 2020 10:42

## 2020-06-08 NOTE — PDOC
Infectious Disease Note


Subjective:


Subjective


Comfortable, denies pain


Ambulating in hallway without difficulty


Does not require any pain medication


Started soft diet this morning tolerating it well


Denies fever, nausea, vomiting, shortness of breath, diarrhea, abdominal pain, 

rash


Otherwise as above


On PPN





Vital Signs:


Vital Signs





Vital Signs








  Date Time  Temp Pulse Resp B/P (MAP) Pulse Ox O2 Delivery O2 Flow Rate FiO2


 


6/8/20 07:00 98.0 70 18 151/76 (101) 96 Room Air  





 98.0       


 


6/8/20 03:00       2.0 











Physical Exam:


PHYSICAL EXAM


GENERAL:  Propped up in bed, alert, relaxed 


HEENT:  Oral cavity pink, no thrush/lesions seen 


NECK:  Supple. 


LUNGS:  Clear, nonlabored 


HEART:  S1, S2 regular.


ABDOMEN:  Nondistended, soft, nontender, no rebound, BS present   


EXTREMITIES:  No edema, cyanosis.


SKIN:  warm to touch. No signs of rash 


NEUROLOGIC: Alert, oriented x 3,  No focal neurologic deficit.


RUE-PICC (6/4) clean





Medications:


Inpatient Meds:





Current Medications








 Medications


  (Trade)  Dose


 Ordered  Sig/Jean  Start Time


 Stop Time Status Last Admin


Dose Admin


 


 Acetaminophen


  (Tylenol Supp)  650 mg  PRN Q4HRS  PRN  5/26/20 14:45


     





 


 Albuterol Sulfate


  (Ventolin Neb


 Soln)  3 mg  PRN QID  PRN  5/26/20 15:15


     





 


 Bisacodyl


  (Dulcolax Supp)  10 mg  PRN DAILY  PRN  6/2/20 11:00


     





 


 Dextrose


  (Dextrose


 50%-Water Syringe)  12.5 gm  PRN Q15MIN  PRN  6/6/20 23:15


   Cancel  





 


 Enoxaparin Sodium


  (Lovenox 40mg


 Syringe)  40 mg  Q24H  5/26/20 15:00


    6/5/20 14:13


40 MG


 


 Famotidine


  (Pepcid Vial)  20 mg  QHS  6/2/20 21:00


    6/7/20 22:11


20 MG


 


 Famotidine


  (Pepcid)  20 mg  QHS  6/1/20 21:00


 6/2/20 10:56 DC 6/1/20 20:57


20 MG


 


 Fentanyl Citrate


  (Fentanyl 2ml


 Vial)  50 mcg  PRN Q2HR  PRN  5/27/20 11:45


    6/4/20 16:21


50 MCG


 


 Info


  (CONTRAST GIVEN


 -- Rx MONITORING)  1 each  PRN DAILY  PRN  6/1/20 11:45


 6/3/20 11:44 DC  





 


 Info


  (Tpn Per


 Pharmacy)  1 each  PRN DAILY  PRN  6/3/20 12:00


    6/7/20 11:32


1 EACH


 


 Insulin Glargine


  (Lantus Syringe)  10 unit  Q12H  6/7/20 09:00


    6/7/20 22:41


10 UNIT


 


 Insulin Human


 Lispro


  (HumaLOG)  3 units  1X  ONCE  6/7/20 18:45


 6/7/20 18:46 DC 6/7/20 18:54


3 UNITS


 


 Insulin Human


 Regular 100 unit/


 Sodium Chloride  101 ml @ 0


 mls/hr  CONT  PRN  5/26/20 18:00


    5/27/20 02:12


7.4 MLS/HR


 


 Iohexol


  (Omnipaque 240


 Mg/ml)  50 ml  1X  ONCE  5/26/20 11:30


 5/26/20 11:31 DC 5/26/20 11:30


50 ML


 


 Iohexol


  (Omnipaque 300


 Mg/ml)  75 ml  1X  ONCE  6/1/20 11:45


 6/1/20 11:46 DC 6/1/20 11:45


75 ML


 


 Leucovorin Calcium


  (Wellcovorin)  15 mg  Q8HRS  6/4/20 10:00


    6/8/20 06:24


15 MG


 


 Lidocaine HCl


  (Buffered


 Lidocaine 1%)  3 ml  STK-MED ONCE  6/4/20 11:58


 6/4/20 11:59 DC  





 


 Magnesium


 Hydroxide


  (Milk Of


 Magnesia)  2,400 mg  PRN DAILY  PRN  5/30/20 15:30


     





 


 Magnesium Sulfate  50 ml @ 25


 mls/hr  1X  ONCE  6/5/20 10:45


 6/5/20 12:44 DC 6/5/20 11:04


25 MLS/HR


 


 Magnesium Sulfate/


 Dextrose  100 ml @ 


 100 mls/hr  1X  ONCE  6/4/20 16:00


 6/4/20 16:59 DC 6/4/20 16:16


100 MLS/HR


 


 Methylprednisolone


 Sodium Succinate


  (SOLU-Medrol


 40MG VIAL)  40 mg  DAILY  5/26/20 15:15


 5/28/20 13:44 DC 5/28/20 08:21


40 MG


 


 Methylprednisolone


 Sodium Succinate


  (SOLU-Medrol


 125MG VIAL)  1,000 mg  DAILY08  6/6/20 08:00


 6/8/20 07:59 UNV  





 


 Methylprednisolone


 Sodium Succinate


 1000 mg/Sodium


 Chloride  100 ml @ 


 100 mls/hr  Q24H  6/6/20 08:00


 6/8/20 07:59 DC 6/7/20 10:14


100 MLS/HR


 


 Morphine Sulfate


  (Morphine


 Sulfate)  4 mg  PRN Q2HR  PRN  5/26/20 14:45


 6/1/20 10:23 DC 5/28/20 16:18


4 MG


 


 Ondansetron HCl


  (Zofran)  4 mg  PRN Q4HRS  PRN  5/26/20 14:45


    6/2/20 14:06


4 MG


 


 Oxycodone HCl


  (Roxicodone)  5 mg  PRN Q4HRS  PRN  5/30/20 15:30


    6/6/20 10:30


5 MG


 


 Polyethylene


 Glycol


  (miraLAX PACKET)  17 gm  DAILY  5/30/20 15:30


    6/6/20 10:29


17 GM


 


 Potassium


 Chloride/Water  100 ml @ 


 100 mls/hr  Q1H  6/3/20 20:00


 6/3/20 23:59 DC 6/4/20 01:53


100 MLS/HR


 


 Potassium Chloride


  (Klor-Con)  20 meq  [id number]  6/6/20 08:00


    6/7/20 08:31


20 MEQ


 


 Prednisone


  (Prednisone)  3 mg  DAILY  5/29/20 09:00


    6/6/20 10:29


3 MG


 


 Psyllium


 Hydrophilic


 Mucilloid


  (Metamucil Fiber


 Packet)  1 pkt  DAILY  5/30/20 15:30


    6/6/20 10:29


1 PKT


 


 Ringer's Solution  1,000 ml @ 


 75 mls/hr  [id number]  6/2/20 09:30


    6/4/20 01:53


75 MLS/HR


 


 Sodium Chloride


  (Saline Mist


 Nasal)  1 néstor  PRN Q1HR  PRN  5/26/20 15:15


     





 


 Sodium Chloride


 90 meq/Potassium


 Chloride 50 meq/


 Potassium


 Phosphate 13.6


 mmol/Magnesium


 Sulfate 10 meq/


 Calcium Gluconate


 10 meq/


 Multivitamins 10


 ml/Chromium/


 Copper/Manganese/


 Seleni/Zn 1 ml/


 Total Parenteral


 Nutrition/Amino


 Acids/Dextrose/


 Fat Emulsion


 Intravenous  1,512 ml @ 


 63 mls/hr  TPN  CONT  6/4/20 22:00


 6/5/20 21:59 DC 6/4/20 22:12


63 MLS/HR


 


 Sodium Chloride


 90 meq/Potassium


 Chloride 70 meq/


 Potassium


 Phosphate 13.6


 mmol/Magnesium


 Sulfate 18 meq/


 Calcium Gluconate


 10 meq/


 Multivitamins 10


 ml/Chromium/


 Copper/Manganese/


 Seleni/Zn 1 ml/


 Total Parenteral


 Nutrition/Amino


 Acids/Dextrose/


 Fat Emulsion


 Intravenous  1,512 ml @ 


 63 mls/hr  TPN  CONT  6/7/20 22:00


 6/8/20 21:59  6/7/20 22:12


63 MLS/HR


 


 Tramadol HCl


  (Ultram)  50 mg  PRN Q6HRS  PRN  5/30/20 10:15


    6/2/20 06:08


50 MG











Labs:


Lab





Laboratory Tests








Test


 6/7/20


11:04 6/7/20


18:31 6/7/20


19:41 6/7/20


23:40


 


Glucose (Fingerstick)


 245 mg/dL


(70-99) 401 mg/dL


(70-99) 339 mg/dL


(70-99) 321 mg/dL


(70-99)


 


Test


 6/8/20


04:45 6/8/20


06:09 


 





 


Sodium Level


 138 mmol/L


(136-145) 


 


 





 


Potassium Level


 4.2 mmol/L


(3.5-5.1) 


 


 





 


Chloride Level


 101 mmol/L


() 


 


 





 


Carbon Dioxide Level


 25 mmol/L


(21-32) 


 


 





 


Anion Gap 12 (6-14)    


 


Blood Urea Nitrogen


 15 mg/dL


(7-20) 


 


 





 


Creatinine


 0.7 mg/dL


(0.6-1.0) 


 


 





 


Estimated GFR


(Cockcroft-Gault) 85.9 


 


 


 





 


BUN/Creatinine Ratio 21 (6-20)    


 


Glucose Level


 341 mg/dL


(70-99) 


 


 





 


Calcium Level


 9.4 mg/dL


(8.5-10.1) 


 


 





 


Phosphorus Level


 5.0 mg/dL


(2.6-4.7) 


 


 





 


Magnesium Level


 2.3 mg/dL


(1.8-2.4) 


 


 





 


Total Bilirubin


 0.2 mg/dL


(0.2-1.0) 


 


 





 


Aspartate Amino Transf


(AST/SGOT) 12 U/L (15-37) 


 


 


 





 


Alanine Aminotransferase


(ALT/SGPT) 19 U/L (14-59) 


 


 


 





 


Alkaline Phosphatase


 95 U/L


() 


 


 





 


Total Protein


 7.2 g/dL


(6.4-8.2) 


 


 





 


Albumin


 3.0 g/dL


(3.4-5.0) 


 


 





 


Albumin/Globulin Ratio 0.7 (1.0-1.7)    


 


Triglycerides Level


 233 mg/dL


(0-150) 


 


 





 


Glucose (Fingerstick)


 


 372 mg/dL


(70-99) 


 














Objective:


Assessment:


1.  Acute pancreatitis, possibly from methotrexate or very high triglycerides


2.  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 

started on Leucovorin and G CSF.  WBC improving 


3.  Adult-onset Still's disease.


4.  Hypertriglyceridemia


5 on steroids





Plan:


Plan of Care


Continue supportive care


Heme/onc eval following


On steroids taper and G-CSF











LEXIS BALLARD MD            Jun 8, 2020 09:28

## 2020-06-09 VITALS — DIASTOLIC BLOOD PRESSURE: 69 MMHG | SYSTOLIC BLOOD PRESSURE: 124 MMHG

## 2020-06-09 VITALS — DIASTOLIC BLOOD PRESSURE: 64 MMHG | SYSTOLIC BLOOD PRESSURE: 107 MMHG

## 2020-06-09 RX ADMIN — LEUCOVORIN CALCIUM SCH MG: 5 TABLET ORAL at 07:02

## 2020-06-09 RX ADMIN — SODIUM CHLORIDE, SODIUM LACTATE, POTASSIUM CHLORIDE, AND CALCIUM CHLORIDE SCH MLS/HR: .6; .31; .03; .02 INJECTION, SOLUTION INTRAVENOUS at 01:30

## 2020-06-09 RX ADMIN — INSULIN LISPRO SCH UNITS: 100 INJECTION, SOLUTION INTRAVENOUS; SUBCUTANEOUS at 06:00

## 2020-06-09 RX ADMIN — POLYETHYLENE GLYCOL 3350 SCH GM: 17 POWDER, FOR SOLUTION ORAL at 08:28

## 2020-06-09 RX ADMIN — INSULIN LISPRO SCH UNITS: 100 INJECTION, SOLUTION INTRAVENOUS; SUBCUTANEOUS at 00:00

## 2020-06-09 RX ADMIN — POTASSIUM CHLORIDE SCH MEQ: 1500 TABLET, EXTENDED RELEASE ORAL at 08:28

## 2020-06-09 RX ADMIN — PSYLLIUM HUSK SCH PKT: 3.4 POWDER ORAL at 08:28

## 2020-06-09 NOTE — PDOC
Subjective:


Subjective:


Off TPN, tolerating regular diet.  Has a "flash" of pain in LUQ occasionally 

after eating but does not need pain meds.





Objective:


Vital Signs:





                                   Vital Signs








  Date Time  Temp Pulse Resp B/P (MAP) Pulse Ox O2 Delivery O2 Flow Rate FiO2


 


6/9/20 07:15      Room Air  


 


6/9/20 07:00 97.6 78 18 107/64 (78) 95   





 97.6       


 


6/8/20 08:30       2.0 








Labs:





Laboratory Tests








Test


 6/8/20


11:40 6/8/20


16:44 6/8/20


20:44 6/9/20


06:13


 


Glucose (Fingerstick) 372 mg/dL  236 mg/dL  306 mg/dL  122 mg/dL 











PE:





GEN: NAD


LUNGS: CTAB


HEART: RRR


ABD: NABS, S/ND/NT


NEURO/PSYCH: A & O 3





A/P:


Pancreatitis, hypertriglyceridemia


Still's disease, DM, celiac





--


Note DC orders - okay per GI.





Justicifation of Admission Dx:


Justifications for Admission:


Justification of Admission Dx:  Yes











AILYN OWEN          Jun 9, 2020 10:05

## 2020-06-09 NOTE — PDOC3
Discharge Summary


Visit Information


Date of Admission:  May 26, 2020


Date of Discharge:  Jun 9, 2020


Final Diagnosis





Acute pancreatitis - likely 2/2 hyper-triglyceridemia.  IgG4 RD less likely 

given level of 72 mg/DL and chronic immunosuppression on methotrexate and 

prednisone.  Dr. Manning talked to [facility] rheumatology concern that MTX could play 

a role as well.  Hold dosing for leukopenia


 6/2 ct Progression of findings of acute pancreatitis. Pancreatic enhancement  

pattern is slightly heterogeneous but no definable nonenhancing component to 

suggest liquefactive necrosis at this time. In addition, there is been 

substantial increase in left lateral peripancreatic fluid which extends 


laterally to the left in the region of the lateral conal and renal fascia,and 

dissects distally into the left pelvis. This could be infected fluid, but does 

not appear to be an encapsulated collection.


Duodenal and jejunal intraluminal lipomas


Hypertriglyceridemia - will need outpatient Endocrinology referral for 

cholesterol metabolism treatment, I have suggested prescription 2g TID fish oil 

(Vascepa or Lovaza) in the meantime once pancreatitis resolves


Hyponatremia


DM2 with Hyperglycemia - A1c 13.6. Should continue on insulin therapy until A1c 

< 8, then consider PO options. Would probably have to avoid GLP1 therapy given 

her pancreatitis.  Would defer to an endocrinologist for this.


Celiac disease 


Adult onset stills disease


Still's disease w/ h/o methotrexate and prednisone use, leukopenia improved with

high-dose steroids and leucovorin


Anemia


Asthma


[place] on CPAP


GERD 


Leukopenia -likely related to chronic immunosuppression will hold MTX therapy 

for now and change to a prednisone taper on discharge.


Hyperglycemia secondary to high-dose steroids


























Problems


Medical Problems:


(1) Dehydration


Status: Acute  





(2) Hyperglycemia


Status: Acute  





(3) Pancreatitis, acute


Status: Acute  








Brief Hospital Course


Allergies





                                    Allergies








Coded Allergies Type Severity Reaction Last Updated Verified


 


  coconut Allergy Intermediate HIVES 5/26/20 Yes








Vital Signs





Vital Signs








  Date Time  Temp Pulse Resp B/P (MAP) Pulse Ox O2 Delivery O2 Flow Rate FiO2


 


6/9/20 07:00 97.6 78 18 107/64 (78) 95 Room Air  





 97.6       


 


6/8/20 08:30       2.0 








Lab Results





Laboratory Tests








Test


 6/7/20


11:04 6/7/20


18:31 6/7/20


19:41 6/7/20


23:40


 


Glucose (Fingerstick)


 245 mg/dL


(70-99) 401 mg/dL


(70-99) 339 mg/dL


(70-99) 321 mg/dL


(70-99)


 


Test


 6/8/20


04:45 6/8/20


06:09 6/8/20


11:40 6/8/20


16:44


 


Sodium Level


 138 mmol/L


(136-145) 


 


 





 


Potassium Level


 4.2 mmol/L


(3.5-5.1) 


 


 





 


Chloride Level


 101 mmol/L


() 


 


 





 


Carbon Dioxide Level


 25 mmol/L


(21-32) 


 


 





 


Anion Gap 12 (6-14)    


 


Blood Urea Nitrogen


 15 mg/dL


(7-20) 


 


 





 


Creatinine


 0.7 mg/dL


(0.6-1.0) 


 


 





 


Estimated GFR


(Cockcroft-Gault) 85.9 


 


 


 





 


BUN/Creatinine Ratio 21 (6-20)    


 


Glucose Level


 341 mg/dL


(70-99) 


 


 





 


Calcium Level


 9.4 mg/dL


(8.5-10.1) 


 


 





 


Phosphorus Level


 5.0 mg/dL


(2.6-4.7) 


 


 





 


Magnesium Level


 2.3 mg/dL


(1.8-2.4) 


 


 





 


Total Bilirubin


 0.2 mg/dL


(0.2-1.0) 


 


 





 


Aspartate Amino Transf


(AST/SGOT) 12 U/L (15-37) 


 


 


 





 


Alanine Aminotransferase


(ALT/SGPT) 19 U/L (14-59) 


 


 


 





 


Alkaline Phosphatase


 95 U/L


() 


 


 





 


Total Protein


 7.2 g/dL


(6.4-8.2) 


 


 





 


Albumin


 3.0 g/dL


(3.4-5.0) 


 


 





 


Albumin/Globulin Ratio 0.7 (1.0-1.7)    


 


Triglycerides Level


 233 mg/dL


(0-150) 


 


 





 


Glucose (Fingerstick)


 


 372 mg/dL


(70-99) 372 mg/dL


(70-99) 236 mg/dL


(70-99)


 


Test


 6/8/20


20:44 6/9/20


06:13 


 





 


Glucose (Fingerstick)


 306 mg/dL


(70-99) 122 mg/dL


(70-99) 


 











Laboratory Tests








Test


 6/8/20


11:40 6/8/20


16:44 6/8/20


20:44 6/9/20


06:13


 


Glucose (Fingerstick)


 372 mg/dL


(70-99) 236 mg/dL


(70-99) 306 mg/dL


(70-99) 122 mg/dL


(70-99)








Brief Hospital Course


 


Ms Kislia is a 58yo F [employer]  w/ PMHx celiac disease, adult onset stills 

disease, Anemia, Asthma, [place] on CPAP, GERD who was dmitted with severe upper ab

dominal pain, nausea, vomiting, fatigue. started suddenly


on admit, Labs significant for lipase 19231, glucose 477, , bilirubin 1.1,

Calcium 8.3, K 3.7, WBC 6.3, Hb 15, Platelets 247


CT abdomen pelvis shows surgically absent gallbladder and surgically absent 

uterus and describes duodenal and jejunal intraluminal lipomas. Findings of 

acute pancreatitis. No loculated collections. No biliary dilatation.


NPO,   fluids,  TPN started and supportive care and she improved over days. 


lipase down to < 400 on 5/30,  still wasnt eating, still pain, 


Ca-[id number] 51, 


f/u [facility]





Discharge Information


Condition at Discharge:  Improved


Follow Up:  Weeks


Disposition/Orders:  D/C to Home


Scheduled


Cetirizine Hcl (Zyrtec) 10 Mg Capsule, 10 MG PO DAILY for allergy, (Reported)


   Entered as Reported by: Dave Walters on 5/26/20 1427


   Last Action: New Order on 5/26/20 1427 by Dave Walters


Famotidine (Famotidine) 20 Mg Tablet, 20 MG PO QHS for GERD, #30


   Prescribed by: JEIMY VELAZQUEZ on 6/9/20 0812


Ferrous Sulfate (Ferrous Sulfate) 325 Mg Tablet, 1 TAB PO DAILY for ANEMIA, #30 

Ref 3 (Reported)


   Entered as Reported by: Dave Walters on 5/26/20 1427


   Last Action: New Order on 5/26/20 1427 by Dave Walters


Folic Acid (Folic Acid) 0.8 Mg Capsule, 1 CAP PO DAILY for supplement for 30 

Days, #30 Ref 0 (Reported)


   Entered as Reported by: Dave Walters on 5/26/20 1427


   Last Action: New Order on 5/26/20 1427 by Dave Walters


Insulin Aspart (Insulin Aspart Flexpen) 100 Unit/1 Ml Insuln.pen, 4 UNIT SQ 

TIDAC for diabetes for 30 Days


   Prescribed by: JEIMY VELAZQUEZ on 6/9/20 0812


Insulin Glargine,Hum.rec.anlog (Lantus) 100 Unit/1 Ml Vial, 10 UNIT SQ QHS for 

diabetes for 30 Days


   Prescribed by: JEIMY VELAZQUEZ on 6/9/20 0812


Pantoprazole Sodium (Protonix  **) 40 Mg Tablet.dr, 40 MG PO DAILYAC for GERD, 

(Reported)


   Entered as Reported by: Dave Walters on 5/26/20 1427


   Last Action: New Order on 5/26/20 1427 by Dave Walters


Prednisone (Prednisone) 1 Mg Tablet, 3 MG PO DAILY for methotrexate reaction for

30 Days, #90


   Prescribed by: JEIMY VELAZQUEZ on 6/9/20 0812





Scheduled PRN


Polyethylene Glycol 3350 (Polyethylene Glycol 3350) 17 Gm Powd.pack, 17 GM PO 

DAILY PRN for CONSTIPATION, #30


   Prescribed by: JEIMY VELAZQUEZ on 6/9/20 0812


Tramadol Hcl (Tramadol Hcl) 50 Mg Tablet, 50 MG PO PRN Q6HRS PRN for PAIN, #15


   Prescribed by: JEIMY VELAZQUEZ on 6/9/20 0812





Discontinued Medications


Methotrexate Sodium (Methotrexate) 2.5 Mg Tablet, 8 TAB PO WEEKLY for pain, #32 

Ref 2 (Reported)


   Entered as Reported by: Dave Walters on 5/26/20 1427


   Last Taken: Unknown Dose on 5/22/20      Last Action: New Order on 5/26/20 1427 by Dave Walters





Patient Instructions


Patient Instructions


< 30 minutes 


face to face





Justicifation of Admission Dx:


Justifications for Admission:


Justification of Admission Dx:  Yes











JEIMY VELAZQUEZ MD                  Jun 9, 2020 08:06

## 2020-06-09 NOTE — PDOC
Infectious Disease Note


Subjective:


Subjective


Patient doing well


Tolerating diet well


Ambulating in hallway without difficulty


Does not require any pain medicationll


Denies fever, nausea, vomiting, shortness of breath, diarrhea, abdominal pain, 

rash


Otherwise as above


Off PPN





Vital Signs:


Vital Signs





Vital Signs








  Date Time  Temp Pulse Resp B/P (MAP) Pulse Ox O2 Delivery O2 Flow Rate FiO2


 


6/9/20 07:15      Room Air  


 


6/9/20 07:00 97.6 78 18 107/64 (78) 95   





 97.6       


 


6/8/20 08:30       2.0 











Physical Exam:


PHYSICAL EXAM


GENERAL:  Propped up in bed, alert, relaxed 


HEENT:  Oral cavity pink, no thrush/lesions seen 


NECK:  Supple. 


LUNGS:  Clear, nonlabored 


HEART:  S1, S2 regular.


ABDOMEN:  Nondistended, soft, nontender, no rebound, BS present   


EXTREMITIES:  No edema, cyanosis.


SKIN:  warm to touch. No signs of rash 


NEUROLOGIC: Alert, oriented x 3,  No focal neurologic deficit.


RUE-PICC (6/4) clean





Medications:


Inpatient Meds:





Current Medications








 Medications


  (Trade)  Dose


 Ordered  Sig/Jean  Start Time


 Stop Time Status Last Admin


Dose Admin


 


 Acetaminophen


  (Tylenol Supp)  650 mg  PRN Q4HRS  PRN  5/26/20 14:45


     





 


 Albuterol Sulfate


  (Ventolin Neb


 Soln)  3 mg  PRN QID  PRN  5/26/20 15:15


     





 


 Bisacodyl


  (Dulcolax Supp)  10 mg  PRN DAILY  PRN  6/2/20 11:00


     





 


 Dextrose


  (Dextrose


 50%-Water Syringe)  12.5 gm  PRN Q15MIN  PRN  6/6/20 23:15


   Cancel  





 


 Enoxaparin Sodium


  (Lovenox 40mg


 Syringe)  40 mg  Q24H  5/26/20 15:00


    6/5/20 14:13


40 MG


 


 Famotidine


  (Pepcid Vial)  20 mg  QHS  6/2/20 21:00


 6/8/20 10:40 DC 6/7/20 22:11


20 MG


 


 Famotidine


  (Pepcid)  20 mg  QHS  6/8/20 21:00


    6/8/20 21:27


20 MG


 


 Fentanyl Citrate


  (Fentanyl 2ml


 Vial)  50 mcg  PRN Q2HR  PRN  5/27/20 11:45


    6/4/20 16:21


50 MCG


 


 Info


  (CONTRAST GIVEN


 -- Rx MONITORING)  1 each  PRN DAILY  PRN  6/1/20 11:45


 6/3/20 11:44 DC  





 


 Info


  (Tpn Per


 Pharmacy)  1 each  PRN DAILY  PRN  6/3/20 12:00


 6/8/20 13:26 DC 6/7/20 11:32


1 EACH


 


 Insulin Glargine


  (Lantus Syringe)  10 unit  QHS  6/9/20 21:00


     





 


 Insulin Human


 Lispro


  (HumaLOG)  3 units  1X  ONCE  6/7/20 18:45


 6/7/20 18:46 DC 6/7/20 18:54


3 UNITS


 


 Insulin Human


 Regular 100 unit/


 Sodium Chloride  101 ml @ 0


 mls/hr  CONT  PRN  5/26/20 18:00


 6/8/20 12:54 DC 5/27/20 02:12


7.4 MLS/HR


 


 Iohexol


  (Omnipaque 240


 Mg/ml)  50 ml  1X  ONCE  5/26/20 11:30


 5/26/20 11:31 DC 5/26/20 11:30


50 ML


 


 Iohexol


  (Omnipaque 300


 Mg/ml)  75 ml  1X  ONCE  6/1/20 11:45


 6/1/20 11:46 DC 6/1/20 11:45


75 ML


 


 Leucovorin Calcium


  (Wellcovorin)  15 mg  Q8HRS  6/4/20 10:00


    6/9/20 07:02


15 MG


 


 Lidocaine HCl


  (Buffered


 Lidocaine 1%)  3 ml  STK-MED ONCE  6/4/20 11:58


 6/4/20 11:59 DC  





 


 Magnesium


 Hydroxide


  (Milk Of


 Magnesia)  2,400 mg  PRN DAILY  PRN  5/30/20 15:30


     





 


 Magnesium Sulfate  50 ml @ 25


 mls/hr  1X  ONCE  6/5/20 10:45


 6/5/20 12:44 DC 6/5/20 11:04


25 MLS/HR


 


 Magnesium Sulfate/


 Dextrose  100 ml @ 


 100 mls/hr  1X  ONCE  6/4/20 16:00


 6/4/20 16:59 DC 6/4/20 16:16


100 MLS/HR


 


 Methylprednisolone


 Sodium Succinate


  (SOLU-Medrol


 40MG VIAL)  40 mg  DAILY  5/26/20 15:15


 5/28/20 13:44 DC 5/28/20 08:21


40 MG


 


 Methylprednisolone


 Sodium Succinate


  (SOLU-Medrol


 125MG VIAL)  1,000 mg  DAILY08  6/6/20 08:00


 6/8/20 07:59 UNV  





 


 Methylprednisolone


 Sodium Succinate


 1000 mg/Sodium


 Chloride  100 ml @ 


 100 mls/hr  Q24H  6/6/20 08:00


 6/8/20 07:59 DC 6/7/20 10:14


100 MLS/HR


 


 Morphine Sulfate


  (Morphine


 Sulfate)  4 mg  PRN Q2HR  PRN  5/26/20 14:45


 6/1/20 10:23 DC 5/28/20 16:18


4 MG


 


 Ondansetron HCl


  (Zofran)  4 mg  PRN Q4HRS  PRN  5/26/20 14:45


    6/2/20 14:06


4 MG


 


 Oxycodone HCl


  (Roxicodone)  5 mg  PRN Q4HRS  PRN  5/30/20 15:30


    6/6/20 10:30


5 MG


 


 Polyethylene


 Glycol


  (miraLAX PACKET)  17 gm  DAILY  5/30/20 15:30


    6/6/20 10:29


17 GM


 


 Potassium


 Chloride/Water  100 ml @ 


 100 mls/hr  Q1H  6/3/20 20:00


 6/3/20 23:59 DC 6/4/20 01:53


100 MLS/HR


 


 Potassium Chloride


  (Klor-Con)  20 meq  [id number]  6/6/20 08:00


    6/8/20 09:54


20 MEQ


 


 Prednisone


  (Prednisone)  3 mg  DAILY  5/29/20 09:00


    6/8/20 09:53


3 MG


 


 Psyllium


 Hydrophilic


 Mucilloid


  (Metamucil Fiber


 Packet)  1 pkt  DAILY  5/30/20 15:30


    6/6/20 10:29


1 PKT


 


 Ringer's Solution  1,000 ml @ 


 75 mls/hr  [id number]  6/2/20 09:30


    6/4/20 01:53


75 MLS/HR


 


 Sodium Chloride


  (Saline Mist


 Nasal)  1 néstor  PRN Q1HR  PRN  5/26/20 15:15


     





 


 Sodium Chloride


 90 meq/Potassium


 Chloride 50 meq/


 Potassium


 Phosphate 13.6


 mmol/Magnesium


 Sulfate 10 meq/


 Calcium Gluconate


 10 meq/


 Multivitamins 10


 ml/Chromium/


 Copper/Manganese/


 Seleni/Zn 1 ml/


 Total Parenteral


 Nutrition/Amino


 Acids/Dextrose/


 Fat Emulsion


 Intravenous  1,512 ml @ 


 63 mls/hr  TPN  CONT  6/4/20 22:00


 6/5/20 21:59 DC 6/4/20 22:12


63 MLS/HR


 


 Sodium Chloride


 90 meq/Potassium


 Chloride 70 meq/


 Potassium


 Phosphate 13.6


 mmol/Magnesium


 Sulfate 18 meq/


 Calcium Gluconate


 10 meq/


 Multivitamins 10


 ml/Chromium/


 Copper/Manganese/


 Seleni/Zn 1 ml/


 Total Parenteral


 Nutrition/Amino


 Acids/Dextrose/


 Fat Emulsion


 Intravenous  1,512 ml @ 


 63 mls/hr  TPN  CONT  6/7/20 22:00


 6/8/20 21:59 DC 6/7/20 22:12


63 MLS/HR


 


 Tramadol HCl


  (Ultram)  50 mg  PRN Q6HRS  PRN  5/30/20 10:15


    6/2/20 06:08


50 MG











Labs:


Lab





Laboratory Tests








Test


 6/8/20


11:40 6/8/20


16:44 6/8/20


20:44 6/9/20


06:13


 


Glucose (Fingerstick)


 372 mg/dL


(70-99) 236 mg/dL


(70-99) 306 mg/dL


(70-99) 122 mg/dL


(70-99)











Objective:


Assessment:


1.  Acute pancreatitis, possibly from methotrexate or very high triglycerides, 

resolving


2.  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 

started on Leucovorin and G CSF.  WBC improved


3.  Adult-onset Still's disease.


4.  Hypertriglyceridemia


5 on steroids





Plan:


Plan of Care


Patient is clinically improving


Continue supportive care











LEXIS BALLARD MD            Jun 9, 2020 08:22